# Patient Record
Sex: FEMALE | Race: ASIAN | NOT HISPANIC OR LATINO | Employment: OTHER | URBAN - METROPOLITAN AREA
[De-identification: names, ages, dates, MRNs, and addresses within clinical notes are randomized per-mention and may not be internally consistent; named-entity substitution may affect disease eponyms.]

---

## 2017-01-02 ENCOUNTER — RADIANT APPOINTMENT (OUTPATIENT)
Dept: MAMMOGRAPHY | Facility: CLINIC | Age: 72
End: 2017-01-02
Attending: FAMILY MEDICINE
Payer: COMMERCIAL

## 2017-01-02 DIAGNOSIS — Z12.31 ENCOUNTER FOR SCREENING MAMMOGRAM FOR BREAST CANCER: ICD-10-CM

## 2017-01-02 PROCEDURE — G0202 SCR MAMMO BI INCL CAD: HCPCS

## 2017-01-09 DIAGNOSIS — L30.9 DERMATITIS: Primary | ICD-10-CM

## 2017-01-10 NOTE — TELEPHONE ENCOUNTER
triamcinolone      Last Written Prescription Date: 12/15/16  Last Fill Quantity: 80g,  # refills: 0   Last Office Visit with [facility] or [facility] prescribing provider: 11/11/16 Dr. Peguero

## 2017-01-11 RX ORDER — TRIAMCINOLONE ACETONIDE 1 MG/G
CREAM TOPICAL
Qty: 80 G | Refills: 0 | Status: SHIPPED | OUTPATIENT
Start: 2017-01-11 | End: 2017-04-25

## 2017-01-11 RX ORDER — GLIMEPIRIDE 4 MG/1
TABLET ORAL
Qty: 180 TABLET | Refills: 0 | OUTPATIENT
Start: 2017-01-11

## 2017-01-11 RX ORDER — GABAPENTIN 600 MG/1
TABLET ORAL
Qty: 270 TABLET | Refills: 0 | OUTPATIENT
Start: 2017-01-11

## 2017-01-11 RX ORDER — SIMVASTATIN 20 MG
TABLET ORAL
Qty: 90 TABLET | Refills: 0 | OUTPATIENT
Start: 2017-01-11

## 2017-01-11 NOTE — TELEPHONE ENCOUNTER
Routing refill request to provider for review/approval because:  Per chart review last visit r/t acute dermatitis was more than 1 year ago, please advise.   CHRISTY Carter, Clinical RN Trudy Emery.

## 2017-01-16 ENCOUNTER — TELEPHONE (OUTPATIENT)
Dept: FAMILY MEDICINE | Facility: CLINIC | Age: 72
End: 2017-01-16

## 2017-01-16 DIAGNOSIS — E11.42 DIABETIC POLYNEUROPATHY ASSOCIATED WITH TYPE 2 DIABETES MELLITUS (H): Primary | ICD-10-CM

## 2017-01-16 RX ORDER — HYDROCODONE BITARTRATE AND ACETAMINOPHEN 5; 325 MG/1; MG/1
TABLET ORAL
Qty: 120 TABLET | Refills: 0 | Status: SHIPPED | OUTPATIENT
Start: 2017-01-16 | End: 2017-02-20

## 2017-01-16 NOTE — TELEPHONE ENCOUNTER
Reviewed chart, patient with chronic pain plan, due for refill.   Will fill in pcp absence.     please place rx at front for  and update patient when completed.

## 2017-01-16 NOTE — TELEPHONE ENCOUNTER
Reason for Call:  Medication or medication refill:    Do you use a [facility]?  Name of the pharmacy and phone number for the current request:  Pt will  hard copy of Rx    Name of the medication requested: Hydrocodone-acetaminophen (NORCO) 5-325 MG per tablet    Can we leave a detailed message on this number? YES    Phone number patient can be reached at: Home number on file [phone] (home)    Best Time: Anytime    Call taken on 1/16/2017 at 11:19 AM by Chano Deleon

## 2017-02-12 DIAGNOSIS — L30.9 ACUTE DERMATITIS: ICD-10-CM

## 2017-02-12 DIAGNOSIS — L30.9 DERMATITIS: ICD-10-CM

## 2017-02-12 NOTE — TELEPHONE ENCOUNTER
triamcinolone (KENALOG) 0.1 % cream      Last Written Prescription Date: 1/11/17  Last Fill Quantity: 80,  # refills: 0   Last Office Visit with [facility], [facility] or [facility] prescribing provider: 11/11/16

## 2017-02-13 ENCOUNTER — CARE COORDINATION (OUTPATIENT)
Dept: GERIATRIC MEDICINE | Facility: CLINIC | Age: 72
End: 2017-02-13

## 2017-02-13 NOTE — PROGRESS NOTES
CM attempted to contact member's daughter, Shanika, for her 6 month telephone assessment. No answer, CM left  requesting a return call.    Crystal Parson RN  [facility]  [phone]

## 2017-02-14 ENCOUNTER — CARE COORDINATION (OUTPATIENT)
Dept: GERIATRIC MEDICINE | Facility: CLINIC | Age: 72
End: 2017-02-14

## 2017-02-14 RX ORDER — TRIAMCINOLONE ACETONIDE 1 MG/G
CREAM TOPICAL
Qty: 80 G | Refills: 0 | Status: SHIPPED | OUTPATIENT
Start: 2017-02-14 | End: 2017-04-25

## 2017-02-14 NOTE — PROGRESS NOTES
"Per CM, mailed client an \"Unable to Contact\" letter.    Lou Villegas  Case Management Specialist   [facility]   [phone]    "

## 2017-02-14 NOTE — TELEPHONE ENCOUNTER
Prescription approved per [facility] Refill Protocol.  Selena Loredo RN

## 2017-02-14 NOTE — PROGRESS NOTES
CM attempted to contact member's daughter again, no answer. CMS instructed to mail [facility] letter.    Crystal Parson RN  [facility]  [phone]

## 2017-02-20 DIAGNOSIS — E11.42 DIABETIC POLYNEUROPATHY ASSOCIATED WITH TYPE 2 DIABETES MELLITUS (H): ICD-10-CM

## 2017-02-20 NOTE — TELEPHONE ENCOUNTER
Norco      Last Written Prescription Date: 06/16/17  Last Fill Quantity: 120,  # refills: 0   Last Office Visit with [facility] or [facility] prescribing provider: 08/01/16                                             Routing refill request to provider for review/approval because:  Drug not on the [facility] refill protocol   Zulema Castillo RN

## 2017-02-20 NOTE — TELEPHONE ENCOUNTER
Reason for Call:  Medication or medication refill:    Do you use a [facility]?  Name of the pharmacy and phone number for the current request: written rx    Name of the medication requested: Norco 5-325 mg    Other request: son in law will  sang chammavongsa    Can we leave a detailed message on this number? yes    Phone number patient can be reached at: [phone] ( sang)    Best Time: any    Call taken on 2/20/2017 at 10:01 AM by Debi Nava

## 2017-02-21 RX ORDER — HYDROCODONE BITARTRATE AND ACETAMINOPHEN 5; 325 MG/1; MG/1
TABLET ORAL
Qty: 120 TABLET | Refills: 0 | Status: SHIPPED | OUTPATIENT
Start: 2017-02-21 | End: 2017-03-16

## 2017-03-16 DIAGNOSIS — E11.42 DIABETIC POLYNEUROPATHY ASSOCIATED WITH TYPE 2 DIABETES MELLITUS (H): ICD-10-CM

## 2017-03-16 DIAGNOSIS — L30.9 ACUTE DERMATITIS: ICD-10-CM

## 2017-03-16 DIAGNOSIS — L30.9 DERMATITIS: ICD-10-CM

## 2017-03-16 RX ORDER — HYDROCODONE BITARTRATE AND ACETAMINOPHEN 5; 325 MG/1; MG/1
TABLET ORAL
Qty: 120 TABLET | Refills: 0 | Status: SHIPPED | OUTPATIENT
Start: 2017-03-16 | End: 2017-04-18

## 2017-03-16 RX ORDER — TRIAMCINOLONE ACETONIDE 1 MG/G
CREAM TOPICAL
Qty: 80 G | Refills: 0 | Status: CANCELLED | OUTPATIENT
Start: 2017-03-16

## 2017-03-16 NOTE — TELEPHONE ENCOUNTER
HYDROcodone-acetaminophen (NORCO) 5-325 MG per tablet      Last Written Prescription Date:  2/21/16  Last Fill Quantity: 120,   # refills: 0  Last Office Visit with [facility] or [facility] prescribing provider: 11/11/16  Future Office visit:       Routing refill request to provider for review/approval because:  Drug not on the [facility], [facility] or [facility] refill protocol or controlled substance

## 2017-03-16 NOTE — TELEPHONE ENCOUNTER
triamcinolone (KENALOG) 0.1 % cream      Last Written Prescription Date: 2/14/16  Last Fill Quantity: 80g,  # refills: 0   Last Office Visit with [facility], [facility] or [facility] prescribing provider: 11/11/16

## 2017-03-21 RX ORDER — TRIAMCINOLONE ACETONIDE 1 MG/G
CREAM TOPICAL
Qty: 80 G | Refills: 0 | Status: SHIPPED | OUTPATIENT
Start: 2017-03-21 | End: 2017-12-19

## 2017-03-21 NOTE — TELEPHONE ENCOUNTER
Routing refill request to provider for review/approval because:  Review and verify that the amount being used is appropriate.    Mireille Cuba RN, [facility]

## 2017-04-18 DIAGNOSIS — E11.42 DIABETIC POLYNEUROPATHY ASSOCIATED WITH TYPE 2 DIABETES MELLITUS (H): ICD-10-CM

## 2017-04-18 RX ORDER — HYDROCODONE BITARTRATE AND ACETAMINOPHEN 5; 325 MG/1; MG/1
TABLET ORAL
Qty: 120 TABLET | Refills: 0 | Status: SHIPPED | OUTPATIENT
Start: 2017-04-18 | End: 2017-05-03

## 2017-04-18 NOTE — TELEPHONE ENCOUNTER
I will authorize one refill - patient should be seen for further refills.   Due for diabetes follow-up in May

## 2017-04-18 NOTE — TELEPHONE ENCOUNTER
Reason for Call:  Other prescription    Detailed comments: Patient is requesting refill for Hydrocodone. Patient would also like to note that his son in law, Jorgito will be picking up prescription. Please note account. Thanks.    Phone Number Patient can be reached at: Other phone number:[phone]      Best Time: anytime     Can we leave a detailed message on this number? YES    Call taken on 4/18/2017 at 9:49 AM by Luis Alfredo Navarro

## 2017-04-18 NOTE — TELEPHONE ENCOUNTER
Norco      Last Written Prescription Date: 03/16/17  Last Fill Quantity: 120,  # refills: 0   Last Office Visit with [facility] or [facility] prescribing provider: 11/11/16                                             Routing refill request to provider for review/approval because:  Drug not on the [facility] refill protocol   Zulema Castillo RN

## 2017-04-25 ENCOUNTER — RADIANT APPOINTMENT (OUTPATIENT)
Dept: GENERAL RADIOLOGY | Facility: CLINIC | Age: 72
End: 2017-04-25
Attending: FAMILY MEDICINE
Payer: COMMERCIAL

## 2017-04-25 ENCOUNTER — OFFICE VISIT (OUTPATIENT)
Dept: FAMILY MEDICINE | Facility: CLINIC | Age: 72
End: 2017-04-25
Payer: COMMERCIAL

## 2017-04-25 VITALS
SYSTOLIC BLOOD PRESSURE: 98 MMHG | HEART RATE: 108 BPM | TEMPERATURE: 98.1 F | OXYGEN SATURATION: 100 % | RESPIRATION RATE: 16 BRPM | DIASTOLIC BLOOD PRESSURE: 64 MMHG

## 2017-04-25 DIAGNOSIS — S93.402A SPRAIN OF LEFT ANKLE, UNSPECIFIED LIGAMENT, INITIAL ENCOUNTER: Primary | ICD-10-CM

## 2017-04-25 DIAGNOSIS — G89.4 CHRONIC PAIN SYNDROME: ICD-10-CM

## 2017-04-25 DIAGNOSIS — E11.42 TYPE 2 DIABETES MELLITUS WITH DIABETIC POLYNEUROPATHY, WITHOUT LONG-TERM CURRENT USE OF INSULIN (H): ICD-10-CM

## 2017-04-25 DIAGNOSIS — E78.5 HYPERLIPIDEMIA LDL GOAL <100: ICD-10-CM

## 2017-04-25 DIAGNOSIS — S93.402A SPRAIN OF LEFT ANKLE, UNSPECIFIED LIGAMENT, INITIAL ENCOUNTER: ICD-10-CM

## 2017-04-25 DIAGNOSIS — E11.42 DIABETIC POLYNEUROPATHY ASSOCIATED WITH TYPE 2 DIABETES MELLITUS (H): ICD-10-CM

## 2017-04-25 DIAGNOSIS — I10 ESSENTIAL HYPERTENSION WITH GOAL BLOOD PRESSURE LESS THAN 140/90: ICD-10-CM

## 2017-04-25 PROCEDURE — 73610 X-RAY EXAM OF ANKLE: CPT | Mod: LT

## 2017-04-25 PROCEDURE — 99214 OFFICE O/P EST MOD 30 MIN: CPT | Performed by: FAMILY MEDICINE

## 2017-04-25 RX ORDER — AMLODIPINE AND BENAZEPRIL HYDROCHLORIDE 10; 40 MG/1; MG/1
CAPSULE ORAL
Qty: 90 CAPSULE | Refills: 1 | Status: SHIPPED | OUTPATIENT
Start: 2017-04-25 | End: 2017-12-19

## 2017-04-25 RX ORDER — PIOGLITAZONEHYDROCHLORIDE 45 MG/1
TABLET ORAL
Qty: 90 TABLET | Refills: 1 | Status: SHIPPED | OUTPATIENT
Start: 2017-04-25 | End: 2017-12-19

## 2017-04-25 RX ORDER — GLIMEPIRIDE 4 MG/1
TABLET ORAL
Qty: 180 TABLET | Refills: 1 | Status: SHIPPED | OUTPATIENT
Start: 2017-04-25 | End: 2017-12-19

## 2017-04-25 RX ORDER — SIMVASTATIN 20 MG
TABLET ORAL
Qty: 90 TABLET | Refills: 1 | Status: SHIPPED | OUTPATIENT
Start: 2017-04-25 | End: 2017-12-19

## 2017-04-25 RX ORDER — GABAPENTIN 600 MG/1
600 TABLET ORAL 3 TIMES DAILY
Qty: 270 TABLET | Refills: 1 | Status: SHIPPED | OUTPATIENT
Start: 2017-04-25 | End: 2017-12-19

## 2017-04-25 ASSESSMENT — ANXIETY QUESTIONNAIRES
GAD7 TOTAL SCORE: 6
7. FEELING AFRAID AS IF SOMETHING AWFUL MIGHT HAPPEN: MORE THAN HALF THE DAYS
1. FEELING NERVOUS, ANXIOUS, OR ON EDGE: SEVERAL DAYS
2. NOT BEING ABLE TO STOP OR CONTROL WORRYING: NOT AT ALL
6. BECOMING EASILY ANNOYED OR IRRITABLE: NOT AT ALL
5. BEING SO RESTLESS THAT IT IS HARD TO SIT STILL: SEVERAL DAYS
3. WORRYING TOO MUCH ABOUT DIFFERENT THINGS: SEVERAL DAYS
IF YOU CHECKED OFF ANY PROBLEMS ON THIS QUESTIONNAIRE, HOW DIFFICULT HAVE THESE PROBLEMS MADE IT FOR YOU TO DO YOUR WORK, TAKE CARE OF THINGS AT HOME, OR GET ALONG WITH OTHER PEOPLE: VERY DIFFICULT

## 2017-04-25 ASSESSMENT — PATIENT HEALTH QUESTIONNAIRE - PHQ9: 5. POOR APPETITE OR OVEREATING: SEVERAL DAYS

## 2017-04-25 ASSESSMENT — PAIN SCALES - GENERAL: PAINLEVEL: WORST PAIN (10)

## 2017-04-25 NOTE — LETTER
[facility]    04/25/17    Patient: Enrique Pruitt  YOB: 1945  Medical Record Number: [id number]                                                                  Controlled Substance Agreement  I understand that my care provider has prescribed controlled substances (narcotics, tranquilizers, and/or stimulants) to help manage my condition(s).  I am taking this medicine to help me function or work.  I know that this is strong medicine.  It could have serious side effects and even cause a dependency on the drug.  If I stop these medicines suddenly, I could have severe withdrawal symptoms.    The risks, benefits, and side effects of these medication(s) were explained to me.  I agree that:  1. I will take part in other treatments as advised by my provider.  This may be psychiatry or counseling, physical therapy, behavioral therapy, group treatment, or a referral to a pain clinic.  I will reduce or stop my medicine when my provider tells me to do so.   2. I will take my medicines as prescribed.  I will not change the dose or schedule unless my provider tells me to.  There will be no refills if I  run out early.   I may be contacted at any time without warning and asked to complete a drug test or pill count.   3. I will keep all my appointments at the clinic.  If I miss appointments or fail to follow instructions, my provider may stop my medicine.  4. I will not ask other providers to prescribe controlled substances. And I will not accept controlled substances from other people. If I need another prescribed controlled substance for a new reason, I will notify my provider within one business day.  5. If I enroll in the [place] Medical Marijuana program, I will tell my provider.  I will also sign an agreement to share my medical records with my provider.  6. I will use one pharmacy to fill all of my controlled substance prescriptions.  If my prescription is mailed to my pharmacy, it may  take 5 to 7 days for my medicine to be ready.  7. I understand that my provider, clinic care team, and pharmacy can track controlled substance prescriptions from other providers through a central database (prescription monitoring program).  8. I will bring in my list of medications (or my medicine bottles) each time I come to the clinic.  REV- 04/2016                                                                                                                                            Page 1 of 2      [facility]    04/25/17    Patient: Enrique Pruitt  YOB: 1945  Medical Record Number: [id number]    9. Refills of controlled substances will be made only during office hours.  It is up to me to make sure that I do not run out of my medicines on weekends or holidays.    10. I am responsible for my prescriptions.  If the medicine is lost or stolen, it will not be replaced.   I also agree not to share these medicines with anyone.  11. I agree to not use ANY illegal or recreational drugs.  This includes marijuana, cocaine, bath salts or other drugs.  I agree not to use alcohol unless my provider says I may.  I agree to give urine samples whenever asked.  If I fail to give a urine sample, the provider may stop my medicine.     12. I will tell my nurse or provider right away if I become pregnant or have a new medical problem treated outside of [facility].  13. I understand that this medicine can affect my thinking and judgment.  It may be unsafe for me to drive, use machinery and do dangerous tasks.  I will not do any of these things until I know how the medicine affects me.  If my dose changes, I will wait to see how it affects me.  I will contact my provider if I have concerns about medicine side effects.  I understand that if I do not follow any of the conditions above, my prescriptions or treatment may be stopped.    I agree that my provider, clinic care team, and pharmacy may  work with any city, state or federal law enforcement agency that investigates the misuse, sale, or other diversion of my controlled medicine. I will allow my provider to discuss my care with or share a copy of this agreement with any other treating provider, pharmacy or emergency room where I receive care.  I agree to give up (waive) any right of privacy or confidentiality with respect to these authorizations.   I have read this agreement and have asked questions about anything I did not understand.   ___________________________________    ___________________________  Patient Signature                                                           Date and Time  ___________________________________     ____________________________  Witness                                                                            Date and Time  ___________________________________  Lana Peguero MD  REV-  04/2016                                                                                                                                                                 Page 2 of 2  Opioid Pain Medicines (also known as Narcotics)  What You Need to Know      What are opioids?   Opioids are pain medicines that must be prescribed by a doctor. Examples are:     morphine (MS Contin, Glenna)    oxycodone (Oxycontin)    oxycodone and acetaminophen (Percocet)    hydrocodone and acetaminophen (Vicodin, Norco)     fentanyl patch (Duragesic)     hydromorphone (Dilaudid)     methadone     What do opioids do well?   Opioids are best for short-term pain after a surgery or injury. They also work well for cancer pain. Unlike other pain medicines, they do not cause liver or kidney failure or ulcers. They may help some people with long-lasting (chronic) pain.     What do opioids NOT do well?   Opioids never get rid of pain entirely, and they do not work well for most patients with chronic pain. Opioids do not reduce swelling, one of the causes of  pain. They also don t work well for nerve pain.     Side effects  Talk to your doctor before you start or decide to keep taking one of these medicines. Side effects include:    Lowers your breathing rate enough that it could cause death    Death due to taking more than the prescribed dose    Serious lifelong opioid use      Dependence is not the same as addiction. Addiction is when people keep using a substance that harms their body, their mind or their relations with others. If you have a history of drug or alcohol abuse, taking opioids can cause a relapse.  Over time, opioids don t work as well. Most people will need higher and higher doses. The higher the dose, the more serious the side effects. We don t know the long-term effects of opioids.   People who have used opioids for a long time have a lower quality of life, worse depression, higher levels of pain and more visits to doctors.  Overdose from prescription drugs is the second leading cause of death in the [place] The risk of overdose rises when opioids are taken with other drugs such as:    Medicines used for anxiety and panic attacks (such as lorazepam, alprazolam, clonazepam    Other sedatives    Alcohol    Illegal drugs such as heroin  Never share your opioids with others. Be sure to store opioids in a secure place, locked if possible.Young children can easily swallow them and overdose.     Are there other ways to manage pain?  Ways to help reduce pain:    Exercise every day.    Treat health problems that may be causing pain.    Treat mental health problems like depression and anxiety.     Worse depression symptoms; Less pleasure in things you usually enjoy    Feeling tired or sluggish    Slower thoughts or cloudy thinking    Being more sensitive to pain over time; Pain is harder to control.    Trouble sleeping or restless sleep    Changes in hormone levels (for example, less testosterone).     Changes in sex drive or ability to have sex    Long lasting  nausea and constipation    Trouble breathing while asleep; This is worse with lung problems like COPD or sleep apnea.    Unsafe driving    Getting sick more often    Itching    Feeling dizzy    Dry mouth    Sweating    Trouble emptying the bladder (peeing). This is worse if you have an enlarged prostate or get urinary tract infections (UTIs).    What else should I know about opioids?  When someone takes opioids for too long or too often, they become dependent. This means that if you stop or reduce the medicine too quickly, you will have withdrawal symptoms.          Practice good sleep habits.  Try to go to bed and get up at the same time every day.    Stop smoking.  Tobacco use can make pain worse.    Do things that you enjoy.    Find a way to work through pain without drugs.  Try deep breathing, meditation, visual imagery and aromatherapy.    Ask your doctor to help you create a plan to manage your pain.

## 2017-04-25 NOTE — PROGRESS NOTES
SUBJECTIVE:                                                    Enrique Pruitt is a 72 year old female who presents to clinic today for the following health issues:      Concern - fall     Onset: 1 week    Description:   Pt fell, foot has worsened    Intensity: 10/10    Progression of Symptoms:  worsening    Accompanying Signs & Symptoms:  swelling       Previous history of similar problem:   Hx of numbness    Precipitating factors:   Worsened by: putting pressure    Alleviating factors:  Improved by: NA       Therapies Tried and outcome: wrapped foot - didn't help    SUBJECTIVE:  Here with daughter to asses left ankle / foot injury as above.  Tripped and thinks she inverted left ankle last week.  Very painful, dony posterior.  Walks, but gingerly.  Also in need of follow up diabetes and pain.    Review of systems otherwise negative.  Past medical, family, and social history reviewed and updated in chart.    OBJECTIVE:  BP 98/64 (BP Location: Right arm, Patient Position: Right side, Cuff Size: Adult Regular)  Pulse 108  Temp 98.1  F (36.7  C) (Oral)  Resp 16  SpO2 100%  Alert, pleasant, upbeat, and in no apparent discomfort.  S1 and S2 normal, no murmurs, clicks, gallops or rubs. Regular rate and rhythm. Chest is clear; no wheezes or rales. No edema or JVD.   LLE - post aspect of left ankle swollen, not red.  + tenderness to palpation over distal malleolus.    Past labs reviewed with the patient.     XRAY - my independent reading of the films showed possible avulsion fracture off left distal malleolus     ASSESSMENT / PLAN:  (S92.848A) Sprain of left ankle, unspecified ligament, initial encounter  (primary encounter diagnosis)  Comment: possible fracture - will treat as such - walking boot, limited weight bearing - follow up next week for recheck and another xray as needed   Plan: XR Ankle Left G/E 3 Views            (E11.42) Type 2 diabetes mellitus with diabetic polyneuropathy, without long-term current  use of insulin (H)  Comment: meds refilled - future labs set up   Plan: metFORMIN (GLUCOPHAGE) 500 MG tablet,         glimepiride (AMARYL) 4 MG tablet, sitagliptin         (JANUVIA) 50 MG tablet, pioglitazone (ACTOS) 45        MG tablet, **A1C FUTURE anytime,         **Comprehensive metabolic panel FUTURE anytime,        **Albumin Random Urine Quant FUTURE anytime,         **Lipid panel reflex to direct LDL FUTURE         anytime, **TSH with free T4 reflex FUTURE         anytime            (I10) Essential hypertension with goal blood pressure less than 140/90  Comment: as above   Plan: amLODIPine-benazepril (LOTREL) 10-40 MG per         capsule            (E78.5) Hyperlipidemia LDL goal <100  Comment: as above   Plan: simvastatin (ZOCOR) 20 MG tablet            (E11.42) Diabetic polyneuropathy associated with type 2 diabetes mellitus  Comment: as above   Plan: gabapentin (NEURONTIN) 600 MG tablet            (G89.4) Chronic pain syndrome  Comment: problem list and CSA updated   Plan:     Follow up 1 week   TIMMY Peguero MD    (Chart documentation completed in part with Dragon voice-recognition software.  Even though reviewed some grammatical, spelling, and word errors may remain.)

## 2017-04-25 NOTE — MR AVS SNAPSHOT
After Visit Summary   4/25/2017    Enrique Pruitt    MRN: [id number]           Patient Information     Date Of Birth          1945        Visit Information        Provider Department      4/25/2017 3:20 PM Lana Peguero MD [facility]        Today's Diagnoses     Sprain of left ankle, unspecified ligament, initial encounter    -  1    Type 2 diabetes mellitus with diabetic polyneuropathy, without long-term current use of insulin (H)        Essential hypertension with goal blood pressure less than 140/90        Hyperlipidemia LDL goal <100        Diabetic polyneuropathy associated with type 2 diabetes mellitus        Chronic pain syndrome           Follow-ups after your visit        Follow-up notes from your care team     Return in about 1 week (around 5/2/2017).      Your next 10 appointments already scheduled     May 03, 2017 10:40 AM CDT   Office Visit with Lana Peguero MD   [facility]    [address] [place] [id number] [phone]           Bring a current list of meds and any records pertaining to this visit.  For Physicals, please bring immunization records and any forms needing to be filled out.  Please arrive 10 minutes early to complete paperwork.              Future tests that were ordered for you today     Open Future Orders        Priority Expected Expires Ordered    **A1C FUTURE anytime Routine 4/25/2017 4/25/2018 4/25/2017    **Comprehensive metabolic panel FUTURE anytime Routine 4/25/2017 4/25/2018 4/25/2017    **Albumin Random Urine Quant FUTURE anytime Routine 4/25/2017 4/25/2018 4/25/2017    **Lipid panel reflex to direct LDL FUTURE anytime Routine 4/25/2017 4/25/2018 4/25/2017    **TSH with free T4 reflex FUTURE anytime Routine 4/25/2017 4/25/2018 4/25/2017            Who to contact     If you have questions or need follow up information about today's clinic visit or your  "schedule please contact [facility] directly at [phone].  Normal or non-critical lab and imaging results will be communicated to you by MyChart, letter or phone within 4 business days after the clinic has received the results. If you do not hear from us within 7 days, please contact the clinic through MyChart or phone. If you have a critical or abnormal lab result, we will notify you by phone as soon as possible.  Submit refill requests through MeshApp or call your pharmacy and they will forward the refill request to us. Please allow 3 business days for your refill to be completed.          Additional Information About Your Visit        VoteItharPalkion Information     MeshApp lets you send messages to your doctor, view your test results, renew your prescriptions, schedule appointments and more. To sign up, go to [email or web address] . Click on \"Log in\" on the left side of the screen, which will take you to the Welcome page. Then click on \"Sign up Now\" on the right side of the page.     You will be asked to enter the access code listed below, as well as some personal information. Please follow the directions to create your username and password.     Your access code is: [email or web address]  Expires: 2017 11:49 PM     Your access code will  in 90 days. If you need help or a new code, please call your [facility] clinic or [phone].        Care EveryWhere ID     This is your Care EveryWhere ID. This could be used by other organizations to access your [facility] medical records  [id number]        Your Vitals Were     Pulse Temperature Respirations Pulse Oximetry          108 98.1  F (36.7  C) (Oral) 16 100%         Blood Pressure from Last 3 Encounters:   17 98/64   16 112/66   16 130/68    Weight from Last 3 Encounters:   16 69.8 kg (153 lb 14.4 oz)   16 66.2 kg (145 lb 14.4 oz)   08/12/15 69.7 kg (153 lb 9.6 oz)                 Today's Medication Changes          These " changes are accurate as of: 4/25/17 11:49 PM.  If you have any questions, ask your nurse or doctor.               These medicines have changed or have updated prescriptions.        Dose/Directions    triamcinolone 0.1 % cream   Commonly known as:  KENALOG   This may have changed:  Another medication with the same name was removed. Continue taking this medication, and follow the directions you see here.   Used for:  Dermatitis   Changed by:  Lana Peguero MD        APPLY EXTERNALLY TO THE AFFECTED AREA TWICE DAILY AS NEEDED FOR IRRITATION   Quantity:  80 g   Refills:  0            Where to get your medicines      These medications were sent to [id number] - [place], [place] - [address] [place] [address] RD AT [address], [place] [address]     Phone:  [phone]     amLODIPine-benazepril 10-40 MG per capsule    gabapentin 600 MG tablet    glimepiride 4 MG tablet    metFORMIN 500 MG tablet    pioglitazone 45 MG tablet    simvastatin 20 MG tablet    sitagliptin 50 MG tablet                Primary Care Provider Office Phone # Fax #    Lana Peguero [phone]       [address] [address]        Thank you!     Thank you for choosing [facility]  for your care. Our goal is always to provide you with excellent care. Hearing back from our patients is one way we can continue to improve our services. Please take a few minutes to complete the written survey that you may receive in the mail after your visit with us. Thank you!             Your Updated Medication List - Protect others around you: Learn how to safely use, store and throw away your medicines at www.disposemymeds.org.          This list is accurate as of: 4/25/17 11:49 PM.  Always use your most recent med list.                   Brand Name Dispense Instructions for use    amitriptyline 10 MG tablet    ELAVIL    60 tablet    Take  2 tablets (20 mg) by mouth At Bedtime       amLODIPine-benazepril 10-40 MG per capsule    LOTREL    90 capsule    TAKE 1 CAPSULE BY MOUTH DAILY       blood glucose monitoring test strip    REGINE CONTOUR    300 each    1 strip by In Vitro route 3 times daily Use to test blood sugar three times daily or as directed.       docusate sodium 100 MG capsule    COLACE    30 capsule    Take 1 capsule by mouth 2 times daily as needed for constipation.       Fluocinonide 0.1 % Crea     60 g    Externally apply topically 2 times daily as needed       fluticasone 50 MCG/ACT spray    FLONASE    16 g    SPRAY 1-2 SPRAYS INTO BOTH NOSTRILS DAILY       gabapentin 600 MG tablet    NEURONTIN    270 tablet    Take 1 tablet (600 mg) by mouth 3 times daily       glimepiride 4 MG tablet    AMARYL    180 tablet    TAKE 1 TABLET (4 MG) BY MOUTH 2 TIMES DAILY       HYDROcodone-acetaminophen 5-325 MG per tablet    NORCO    120 tablet    TAKE 1 TO 2 TABLETS BY MOUTH 3 TIMES DAILY AS NEEDED FOR MODERATE TO SVERE PAIN       metFORMIN 500 MG tablet    GLUCOPHAGE    360 tablet    Take 2 tablets (1,000 mg) by mouth 2 times daily (with meals)       * order for DME     1 Device    Accu-Check Glucometer or other as covered by insurance.       * order for DME     1 Units    Accu-Check or other as covered by insurance. Test strips and lancets - per device and insurance plan. Tests once daily. Disp: 100 each Refill: 5 each       * order for DME     1 each    Equipment being ordered: Depends undergarments 80 per month x 1 year       * order for DME     1 each    Equipment being ordered: handheld shower head       * order for DME     1 each    Equipment being ordered: lift chair       * order for DME     1 each    Wheelchair.       * order for DME     1 each    Equipment being ordered: automatic BP cuff       * order for DME     1 Units    Equipment being ordered: diabetic shoes       * order for DME     1 Device    Equipment being ordered: automatic BP  cuff       * order for DME     1 Device    Equipment being ordered: glucometer - one touch ultra or per insurance  Tests daily       * order for DME     1 each    Equipment being ordered: Test strips and lancets - per device and insurance plan. Tests daily  Disp: 100 Refill: 5       * order for DME     1 Device    Equipment being ordered: Life Alert       * order for DME     200 Units    Equipment being ordered: Test strips - accu-check smartview Tests twice daily due to uncontrolled sugar       pioglitazone 45 MG tablet    ACTOS    90 tablet    TAKE 1 TABLET (50 MG) BY MOUTH DAILY       ramelteon 8 MG tablet    ROZEREM    30 tablet    Take 1 tablet (8 mg) by mouth At Bedtime       simvastatin 20 MG tablet    ZOCOR    90 tablet    TAKE 1 TABLET (20 MG) BY MOUTH AT BEDTIME       sitagliptin 50 MG tablet    JANUVIA    90 tablet    TAKE 1 TABLET BY MOUTH EVERY DAY.       * STATIN NOT PRESCRIBED (INTENTIONAL)     0 each        triamcinolone 0.1 % cream    KENALOG    80 g    APPLY EXTERNALLY TO THE AFFECTED AREA TWICE DAILY AS NEEDED FOR IRRITATION       * Notice:  This list has 14 medication(s) that are the same as other medications prescribed for you. Read the directions carefully, and ask your doctor or other care provider to review them with you.

## 2017-04-25 NOTE — NURSING NOTE
"Chief Complaint   Patient presents with     Fall       Initial BP 98/64 (BP Location: Right arm, Patient Position: Right side, Cuff Size: Adult Regular)  Pulse 108  Temp 98.1  F (36.7  C) (Oral)  Resp 16  SpO2 100% Estimated body mass index is 32.44 kg/(m^2) as calculated from the following:    Height as of 11/11/16: 1.467 m (4' 9.75\").    Weight as of 11/11/16: 69.8 kg (153 lb 14.4 oz).  Medication Reconciliation: complete     Will Chiara AVILA      "

## 2017-04-26 ASSESSMENT — ANXIETY QUESTIONNAIRES: GAD7 TOTAL SCORE: 6

## 2017-04-26 ASSESSMENT — PATIENT HEALTH QUESTIONNAIRE - PHQ9: SUM OF ALL RESPONSES TO PHQ QUESTIONS 1-9: 9

## 2017-05-03 ENCOUNTER — OFFICE VISIT (OUTPATIENT)
Dept: FAMILY MEDICINE | Facility: CLINIC | Age: 72
End: 2017-05-03
Payer: COMMERCIAL

## 2017-05-03 ENCOUNTER — RADIANT APPOINTMENT (OUTPATIENT)
Dept: GENERAL RADIOLOGY | Facility: CLINIC | Age: 72
End: 2017-05-03
Attending: FAMILY MEDICINE
Payer: COMMERCIAL

## 2017-05-03 VITALS
SYSTOLIC BLOOD PRESSURE: 126 MMHG | RESPIRATION RATE: 16 BRPM | HEART RATE: 112 BPM | DIASTOLIC BLOOD PRESSURE: 80 MMHG | TEMPERATURE: 98.2 F | OXYGEN SATURATION: 99 %

## 2017-05-03 DIAGNOSIS — I10 HYPERTENSION GOAL BP (BLOOD PRESSURE) < 140/90: ICD-10-CM

## 2017-05-03 DIAGNOSIS — S82.832A CLOSED FRACTURE OF DISTAL END OF LEFT FIBULA, UNSPECIFIED FRACTURE MORPHOLOGY, INITIAL ENCOUNTER: Primary | ICD-10-CM

## 2017-05-03 DIAGNOSIS — E11.42 DIABETIC POLYNEUROPATHY ASSOCIATED WITH TYPE 2 DIABETES MELLITUS (H): ICD-10-CM

## 2017-05-03 DIAGNOSIS — S93.402D SPRAIN OF LEFT ANKLE, UNSPECIFIED LIGAMENT, SUBSEQUENT ENCOUNTER: ICD-10-CM

## 2017-05-03 DIAGNOSIS — E11.42 TYPE 2 DIABETES MELLITUS WITH DIABETIC POLYNEUROPATHY, WITHOUT LONG-TERM CURRENT USE OF INSULIN (H): ICD-10-CM

## 2017-05-03 DIAGNOSIS — E78.5 HYPERLIPIDEMIA LDL GOAL <100: ICD-10-CM

## 2017-05-03 LAB
ALBUMIN SERPL-MCNC: 3.6 G/DL (ref 3.4–5)
ALP SERPL-CCNC: 169 U/L (ref 40–150)
ALT SERPL W P-5'-P-CCNC: 14 U/L (ref 0–50)
ANION GAP SERPL CALCULATED.3IONS-SCNC: 9 MMOL/L (ref 3–14)
AST SERPL W P-5'-P-CCNC: 10 U/L (ref 0–45)
BILIRUB SERPL-MCNC: 0.3 MG/DL (ref 0.2–1.3)
BUN SERPL-MCNC: 21 MG/DL (ref 7–30)
CALCIUM SERPL-MCNC: 9.4 MG/DL (ref 8.5–10.1)
CHLORIDE SERPL-SCNC: 107 MMOL/L (ref 94–109)
CHOLEST SERPL-MCNC: 193 MG/DL
CO2 SERPL-SCNC: 26 MMOL/L (ref 20–32)
CREAT SERPL-MCNC: 0.95 MG/DL (ref 0.52–1.04)
CREAT UR-MCNC: 75 MG/DL
GFR SERPL CREATININE-BSD FRML MDRD: 57 ML/MIN/1.7M2
GLUCOSE SERPL-MCNC: 148 MG/DL (ref 70–99)
HBA1C MFR BLD: 6.9 % (ref 4.3–6)
HDLC SERPL-MCNC: 55 MG/DL
LDLC SERPL CALC-MCNC: 115 MG/DL
MICROALBUMIN UR-MCNC: 21 MG/L
MICROALBUMIN/CREAT UR: 28.65 MG/G CR (ref 0–25)
NONHDLC SERPL-MCNC: 138 MG/DL
POTASSIUM SERPL-SCNC: 4.8 MMOL/L (ref 3.4–5.3)
PROT SERPL-MCNC: 7.9 G/DL (ref 6.8–8.8)
SODIUM SERPL-SCNC: 142 MMOL/L (ref 133–144)
TRIGL SERPL-MCNC: 117 MG/DL
TSH SERPL DL<=0.005 MIU/L-ACNC: 1.12 MU/L (ref 0.4–4)

## 2017-05-03 PROCEDURE — 36415 COLL VENOUS BLD VENIPUNCTURE: CPT | Performed by: FAMILY MEDICINE

## 2017-05-03 PROCEDURE — 84443 ASSAY THYROID STIM HORMONE: CPT | Performed by: FAMILY MEDICINE

## 2017-05-03 PROCEDURE — 27786 TREATMENT OF ANKLE FRACTURE: CPT | Performed by: FAMILY MEDICINE

## 2017-05-03 PROCEDURE — 73610 X-RAY EXAM OF ANKLE: CPT | Mod: LT

## 2017-05-03 PROCEDURE — 83036 HEMOGLOBIN GLYCOSYLATED A1C: CPT | Performed by: FAMILY MEDICINE

## 2017-05-03 PROCEDURE — 82043 UR ALBUMIN QUANTITATIVE: CPT | Performed by: FAMILY MEDICINE

## 2017-05-03 PROCEDURE — 99213 OFFICE O/P EST LOW 20 MIN: CPT | Mod: 25 | Performed by: FAMILY MEDICINE

## 2017-05-03 PROCEDURE — 80061 LIPID PANEL: CPT | Performed by: FAMILY MEDICINE

## 2017-05-03 PROCEDURE — 80053 COMPREHEN METABOLIC PANEL: CPT | Performed by: FAMILY MEDICINE

## 2017-05-03 RX ORDER — HYDROCODONE BITARTRATE AND ACETAMINOPHEN 5; 325 MG/1; MG/1
TABLET ORAL
Qty: 120 TABLET | Refills: 0 | Status: SHIPPED | OUTPATIENT
Start: 2017-05-03 | End: 2017-05-17

## 2017-05-03 ASSESSMENT — PAIN SCALES - GENERAL: PAINLEVEL: WORST PAIN (10)

## 2017-05-03 NOTE — PROGRESS NOTES
SUBJECTIVE:                                                    Enrique Pruitt is a 72 year old female who presents to clinic today for the following health issues:      1. Follow up OV 4/25/17 - sprain, L ankle   - pt states can't put pressure on ankle yet, still having pain -- 10/10 currently    SUBJECTIVE:  Here today in follow-up of left ankle injury and a follow-up of diabetes, hypertension, lipids. I saw her last week and about a week prior to that she had twisted her left ankle and was having pain with ambulation. The initial x-ray was unclear about the possibility of fracture so I placed her in a walking boot with relative nonweightbearing status with plans to return this week when she was coming in for diabetes. She is fine with the boot on it feels pretty comfortable but when she tries to walk without it it is still fairly painful. Still a bit swollen.  As far as diabetes, etc. she is generally doing well. Taking medications as prescribed without side effects. No cardiac symptoms. Still having trouble with peripheral neuropathy but it isn't getting any worse.    Review of systems otherwise negative.  Past medical, family, and social history reviewed and updated in chart.    OBJECTIVE:  /80 (BP Location: Right arm, Patient Position: Right side, Cuff Size: Adult Regular)  Pulse 112  Temp 98.2  F (36.8  C) (Oral)  Resp 16  SpO2 99%  Alert, pleasant, upbeat, and in no apparent discomfort.  S1 and S2 normal, no murmurs, clicks, gallops or rubs. Regular rate and rhythm. Chest is clear; no wheezes or rales. No edema or JVD.   Left ankle - still swollen and slightly bruised over the lateral aspect in moving onto her anterior lateral ankle joint. Mild tenderness to palpation directly over the distal fibula but less than last week  Past labs reviewed with the patient.     XRAY - my independent reading of the films showed a more clearly define nondisplaced fracture of her left distal fibula. I don't  see a lot of callus formation at this point     ASSESSMENT / PLAN:  (O85.734Y) Closed fracture of distal end of left fibula, unspecified fracture morphology, initial encounter  (primary encounter diagnosis)  Comment: I think it is clear that she has a nondisplaced fracture of her distal fibula at this point and we will treated thusly. She is 2 weeks out from the initial injury and I discussed with the patient and her daughter that this is 4-6 weeks of expected healing. The good news is because of its location I think we can get away with a walking boot as long as she limits her ambulation to basic self-care. For anything longer such as a trip to the store, etc. I would want her in a wheelchair for scooter.  Okay to take off boot to change close, bathe, etc. as long as she is nonweightbearing, but keep it on at all other times. I will see her back in 2 weeks with plans to re-x-ray and decide upon length of treatment  Plan: XR Ankle Left G/E 3 Views, CLOSED TX DISTAL         FIBULA FX W/O MANIPULATION            (E11.42) Type 2 diabetes mellitus with diabetic polyneuropathy, without long-term current use of insulin (H)  Comment: Recheck lab work and adjust as needed  Plan: Lipid panel reflex to direct LDL            (E78.5) Hyperlipidemia LDL goal <100  Comment: Recheck LDL  Plan:     (I10) Hypertension goal BP (blood pressure) < 140/90  Comment: At goal. Continue same treatment  Plan:     (E11.42) Diabetic polyneuropathy associated with type 2 diabetes mellitus  Comment: refilled   Plan: HYDROcodone-acetaminophen (NORCO) 5-325 MG per         tablet            Follow up 2 weeks  TIMMY Peguero MD    (Chart documentation completed in part with Dragon voice-recognition software.  Even though reviewed some grammatical, spelling, and word errors may remain.)

## 2017-05-03 NOTE — MR AVS SNAPSHOT
After Visit Summary   5/3/2017    Enrique Pruitt    MRN: [id number]           Patient Information     Date Of Birth          1945        Visit Information        Provider Department      5/3/2017 10:40 AM Lana Peguero MD [facility]        Today's Diagnoses     Closed fracture of distal end of left fibula, unspecified fracture morphology, initial encounter    -  1    Type 2 diabetes mellitus with diabetic polyneuropathy, without long-term current use of insulin (H)        Hyperlipidemia LDL goal <100        Hypertension goal BP (blood pressure) < 140/90        Diabetic polyneuropathy associated with type 2 diabetes mellitus           Follow-ups after your visit        Follow-up notes from your care team     Return in about 2 weeks (around 5/17/2017).      Your next 10 appointments already scheduled     May 17, 2017  1:00 PM CDT   Office Visit with Lana Peguero MD   [facility] ([facility])    [address] [place] [id number] [phone]           Bring a current list of meds and any records pertaining to this visit.  For Physicals, please bring immunization records and any forms needing to be filled out.  Please arrive 10 minutes early to complete paperwork.              Who to contact     If you have questions or need follow up information about today's clinic visit or your schedule please contact [facility] directly at [phone].  Normal or non-critical lab and imaging results will be communicated to you by MyChart, letter or phone within 4 business days after the clinic has received the results. If you do not hear from us within 7 days, please contact the clinic through MyChart or phone. If you have a critical or abnormal lab result, we will notify you by phone as soon as possible.  Submit refill requests through TheLadders or call your pharmacy and they will forward the refill  "request to us. Please allow 3 business days for your refill to be completed.          Additional Information About Your Visit        VocalyticsharJobOn Information     Bizdom lets you send messages to your doctor, view your test results, renew your prescriptions, schedule appointments and more. To sign up, go to [email or web address] . Click on \"Log in\" on the left side of the screen, which will take you to the Welcome page. Then click on \"Sign up Now\" on the right side of the page.     You will be asked to enter the access code listed below, as well as some personal information. Please follow the directions to create your username and password.     Your access code is: [email or web address]  Expires: 2017 11:49 PM     Your access code will  in 90 days. If you need help or a new code, please call your [facility] clinic or [phone].        Care EveryWhere ID     This is your Care EveryWhere ID. This could be used by other organizations to access your [facility] medical records  [id number]        Your Vitals Were     Pulse Temperature Respirations Pulse Oximetry          112 98.2  F (36.8  C) (Oral) 16 99%         Blood Pressure from Last 3 Encounters:   17 126/80   17 98/64   16 112/66    Weight from Last 3 Encounters:   16 69.8 kg (153 lb 14.4 oz)   16 66.2 kg (145 lb 14.4 oz)   08/12/15 69.7 kg (153 lb 9.6 oz)              We Performed the Following     **A1C FUTURE anytime     **Albumin Random Urine Quant FUTURE anytime     **Comprehensive metabolic panel FUTURE anytime     **TSH with free T4 reflex FUTURE anytime     CLOSED TX DISTAL FIBULA FX W/O MANIPULATION     Lipid panel reflex to direct LDL          Where to get your medicines      Some of these will need a paper prescription and others can be bought over the counter.  Ask your nurse if you have questions.     Bring a paper prescription for each of these medications     HYDROcodone-acetaminophen 5-325 MG per tablet          Primary " Care Provider Office Phone # Fax #    Lana Titi Peguero [phone]       [facility] [address] [place] [address]        Thank you!     Thank you for choosing [facility]  for your care. Our goal is always to provide you with excellent care. Hearing back from our patients is one way we can continue to improve our services. Please take a few minutes to complete the written survey that you may receive in the mail after your visit with us. Thank you!             Your Updated Medication List - Protect others around you: Learn how to safely use, store and throw away your medicines at www.disposemymeds.org.          This list is accurate as of: 5/3/17 11:42 AM.  Always use your most recent med list.                   Brand Name Dispense Instructions for use    amitriptyline 10 MG tablet    ELAVIL    60 tablet    Take 2 tablets (20 mg) by mouth At Bedtime       amLODIPine-benazepril 10-40 MG per capsule    LOTREL    90 capsule    TAKE 1 CAPSULE BY MOUTH DAILY       blood glucose monitoring test strip    REGINE CONTOUR    300 each    1 strip by In Vitro route 3 times daily Use to test blood sugar three times daily or as directed.       docusate sodium 100 MG capsule    COLACE    30 capsule    Take 1 capsule by mouth 2 times daily as needed for constipation.       Fluocinonide 0.1 % Crea     60 g    Externally apply topically 2 times daily as needed       fluticasone 50 MCG/ACT spray    FLONASE    16 g    SPRAY 1-2 SPRAYS INTO BOTH NOSTRILS DAILY       gabapentin 600 MG tablet    NEURONTIN    270 tablet    Take 1 tablet (600 mg) by mouth 3 times daily       glimepiride 4 MG tablet    AMARYL    180 tablet    TAKE 1 TABLET (4 MG) BY MOUTH 2 TIMES DAILY       HYDROcodone-acetaminophen 5-325 MG per tablet    NORCO    120 tablet    TAKE 1 TO 2 TABLETS BY MOUTH 3 TIMES DAILY AS NEEDED FOR MODERATE TO SVERE PAIN       metFORMIN 500 MG tablet    GLUCOPHAGE    360  tablet    Take 2 tablets (1,000 mg) by mouth 2 times daily (with meals)       * order for DME     1 Device    Accu-Check Glucometer or other as covered by insurance.       * order for DME     1 Units    Accu-Check or other as covered by insurance. Test strips and lancets - per device and insurance plan. Tests once daily. Disp: 100 each Refill: 5 each       * order for DME     1 each    Equipment being ordered: Depends undergarments 80 per month x 1 year       * order for DME     1 each    Equipment being ordered: handheld shower head       * order for DME     1 each    Equipment being ordered: lift chair       * order for DME     1 each    Wheelchair.       * order for DME     1 each    Equipment being ordered: automatic BP cuff       * order for DME     1 Units    Equipment being ordered: diabetic shoes       * order for DME     1 Device    Equipment being ordered: automatic BP cuff       * order for DME     1 Device    Equipment being ordered: glucometer - one touch ultra or per insurance  Tests daily       * order for DME     1 each    Equipment being ordered: Test strips and lancets - per device and insurance plan. Tests daily  Disp: 100 Refill: 5       * order for DME     1 Device    Equipment being ordered: Life Alert       * order for DME     200 Units    Equipment being ordered: Test strips - accu-check smartview Tests twice daily due to uncontrolled sugar       pioglitazone 45 MG tablet    ACTOS    90 tablet    TAKE 1 TABLET (50 MG) BY MOUTH DAILY       ramelteon 8 MG tablet    ROZEREM    30 tablet    Take 1 tablet (8 mg) by mouth At Bedtime       simvastatin 20 MG tablet    ZOCOR    90 tablet    TAKE 1 TABLET (20 MG) BY MOUTH AT BEDTIME       sitagliptin 50 MG tablet    JANUVIA    90 tablet    TAKE 1 TABLET BY MOUTH EVERY DAY.       * STATIN NOT PRESCRIBED (INTENTIONAL)     0 each        triamcinolone 0.1 % cream    KENALOG    80 g    APPLY EXTERNALLY TO THE AFFECTED AREA TWICE DAILY AS NEEDED FOR  IRRITATION       * Notice:  This list has 14 medication(s) that are the same as other medications prescribed for you. Read the directions carefully, and ask your doctor or other care provider to review them with you.

## 2017-05-03 NOTE — LETTER
[address]  [address]  [phone]    May 9, 2017      Enrique Pruitt  [address] [place] [id number]              Dear Enrique,    Your lab work looks just fine.  Keep up the good work.       Sincerely,    Lana Peguero M.D.

## 2017-05-03 NOTE — NURSING NOTE
"Chief Complaint   Patient presents with     Musculoskeletal Problem     L foot       Initial /80 (BP Location: Right arm, Patient Position: Right side, Cuff Size: Adult Regular)  Pulse 112  Temp 98.2  F (36.8  C) (Oral)  Resp 16  SpO2 99% Estimated body mass index is 32.44 kg/(m^2) as calculated from the following:    Height as of 11/11/16: 1.467 m (4' 9.75\").    Weight as of 11/11/16: 69.8 kg (153 lb 14.4 oz).  Medication Reconciliation: complete     Will Chiara AVILA      "

## 2017-05-04 NOTE — TELEPHONE ENCOUNTER
Radiation Oncology Follow-Up


Date of Visit


May 4, 2017.


 (Rafaela Holguin PA-C)





Reason For Visit


One-month follow-up cancer survivorship care plan.


 (Rafaela Holguin PA-C)





Radiation Completion Date


4/5/17


 (Rafaela Holguin PA-C)





Diagnosis





(1) Intraductal carcinoma of left breast


Status:  Acute        Onset Date:  11/18/2016


Stage:  0


Permanent Comment:  STAGING: Left breast, DCIS, grade 3, comedonecrosis, ER/PA 

positive





Abnormal left breast mammogram followed with serial mammography


Status post stereotactic biopsy 11/18/2016 revealing DCIS


Estrogen receptor positive and progesterone receptor positive


Status post MRI then MRI guided stereotactic biopsy 12/16/2016 benign


Status post seed localization segmental mastectomy 01/09/2017


Stage pTis NX MX


Status post completion of radiation therapy 04/05/2017.  She received 6640 cGy  

Last Edited By: Rafaela Holguin on Apr 17, 2017 10:23 (Rafaela Holguin PA-C)





History of Present Illness


Ms. Sommer is a 43-year-old female who initially presented with a abnormal 

mammogram on 05/05/2016 which revealed a small 3 mm cluster in the left upper 

outer quadrant that were benign-appearing; the recommendation was for a repeat 

mammogram in 6 months.  The patient did have a repeat unilateral left 

diagnostic mammogram on 11/14/2016 which revealed 2 small faint clusters of 

microcalcifications in the upper outer quadrant of the left breast.  The 

patient underwent a stereotactic biopsy on 11/18/2016 the left breast which 

revealed ductal carcinoma in situ that was grade 3 with focal comedonecrosis.  

The tumor was identified is estrogen receptor positive and progesterone 

receptor positive.  The patient subsequently had a bilateral MRI of the breasts 

on 12/14/2016 which revealed biopsy site changes in the left breast 1 o'clock 

position as expected and no other evidence of disease.  The report did mention 

a second cluster of faint amorphous microcalcifications and recommended a 

second biopsy which was completed on 12/16/2016 and only showed fibrocystic 

changes with calcifications and no evidence of cancer.  The patient 

subsequently was seen at [facility] and underwent a lumpectomy on 01/09/ 2017.  Pathology revealed ductal carcinoma in situ, micropapillary type, 

nuclear grade 3 with comedonecrosis and associated calcification.  The DCIS 

measured up to 5 mm.  The margins were free of resection however the closest 

margin was 1.5 mm and was the anterior margin.  The patient was seen by medical 

oncology who did recommend anti-hormonal therapy due to the positive estrogen 

receptor status.  We are now seeing the patient in consultation discuss the 

role of adjuvant radiation therapy.





She underwent conventional radiation therapy.  Radiation was completed 04/05/ 2017.  She received received 6640 cGy.


 (Rafaela Holguin PA-C)





Interim History


She's been doing well over the past month.  The areas of skin irritation healed 

without difficulty.  At the end of treatment she had radiation dermatitis.  It 

was treated with Silvadene cream.  She also had itching that was treated with 

Lidex cream.  She has noticed no changes to her breast.  She is noted no masses 

or tenderness no change of the axilla.  She is now on tamoxifen.  She denies 

side effects.  She did have a small area on the outer aspect of her incision 

that became red and then had a small amount of drainage 2 weeks ago.  She used 

Neosporin and this area healed without difficulty.


 (Rafaela Holguin PA-C)





Allergies


Coded Allergies:  


     No Known Allergies (Unverified , 2/7/17)





Home Medications


Scheduled


Levothyroxine Sodium (Synthroid), 1 TAB PO DAILY


Levothyroxine Sodium (Levothyroxine Sodium), 1.5 TAB PO AS DIRECTED


Tamoxifen (Nolvadex), 20 MG PO DAILY





Review of Systems


Gastrointestinal:  


   Symptoms:  WNL


Oral:  


   Symptoms:  No Problems


Respiratory:  


   Symptoms:  WNL


Urinary:  


   Symptoms:  WNL


Skin:  


   Symptoms:  No Problems


   Other Skin Symptoms:  2 weeks ago incision in axilla area drained - used 

neosporin - ok now


Breast:  


   Right Upper Arm Measurement:  28.3


   Right Mid Arm Measurement:  22.5


   Right Wrist Measurement:  15.2


   Left Upper Arm Measurement:  29.2


   Left Mid Arm Measurement:  24.0


   Left Wrist Measurement:  15.0


   Arm Dominence:  Right


 (Rafaela Holguin PA-C)





Physical Exam





Vital Signs








  Date Time  Temp Pulse Resp B/P Pulse Ox O2 Delivery O2 Flow Rate FiO2


 


5/4/17 14:01 36.8 62 16 131/83 98   








General Appearance:  no apparent distress


Eyes:  normal inspection, EOMI


ENT:  normal ENT inspection, hearing grossly normal


Neck:  no adenopathy, thyroid normal


Respiratory/Chest:  lungs clear, no respiratory distress, no accessory muscle 

use


Breast:


Breast examination reveals well-healed incision of the left breast.  There is 

resolving hyperpigmentation.  There is a small area of the lateral aspect of 

the incision line that has resolving erythema.  There is no drainage or sign of 

infection.  There is minimal edema of the breast.  There is no axillary 

adenopathy.  She has no skin retractions or nipple changes.  Using the [place] 

score cosmesis she has a good outcome.  The right breast showed no masses or 

tenderness no axillary adenopathy.


Cardiovascular:  regular rate, rhythm, no gallop, no murmur


Extremities:  no pedal edema


Neurologic/Psychiatric:  no motor/sensory deficits, alert, normal mood/affect


Skin:  warm/dry


 (Rafaela Holguin PA-C)





Assessment & Plan


Plan: The patient is also seen and examined by Dr. Su today.  She'll 

continue regular follow-up with Dr. Heredia, Dr. Kennedy, and Dr. Levin.  She 

hasn't appointment to see Dr. Abraham in August.  Today we completed a cancer 

survivorship care plan.  Copy of the document was given to the patient.  We 

discussed follow-up mammography.  The patient did wish to continue mammography 

at the breast Center.  She was therefore scheduled for a digital diagnostic 

mammogram of both breasts.  She is due for mammography of the right breast 

also.  She'll continue to do breast self-examinations.  We asked her to return 

to our office in 6 months.  She may call if she has any questions or concerns 

in the interim.


 (Rafaela Holguin PA-C)


I agree with note created by Rafaela Holguin PA-C. I reviewed the patient's 

chart and information with her.  I have examined and evaluated the patient. I 

reviewed relevant clinical information and answered the patient's and/or family'

s questions. 


 (Sesar Su MD)


Total Time In Follow-Up


I spent 20 minutes speaking to the patient performing examination.  I spent 20 

minutes reviewing information, preparing the survivorship document, and 

completing this note.


 (Rafaela Holguin PA-C)


I spent 15 minutes examining and counseling the patient.  


 (Sesar Su MD)





Copy To


Richie Heredia M.D.; ERASMO KENNEDY M.D.; Kalani Levin M.D. Reason for Call:  Medication or medication refill:    Do you use a [facility]?  Name of the pharmacy and phone number for the current request:  WRITTEN PRESCRIPTION REQUESTED    Name of the medication requested: Norco 5-325 mg    Other request: jeremias Calvert    Can we leave a detailed message on this number? YES    Phone number patient can be reached at: [phone]    Best Time: any    Call taken on 3/16/2017 at 9:04 AM by Debi Nava

## 2017-05-08 NOTE — PROGRESS NOTES
Please mail results and note to patient:    Your lab work looks just fine.  Keep up the good work.  TIMMY Peguero MD

## 2017-05-17 ENCOUNTER — OFFICE VISIT (OUTPATIENT)
Dept: FAMILY MEDICINE | Facility: CLINIC | Age: 72
End: 2017-05-17
Payer: COMMERCIAL

## 2017-05-17 ENCOUNTER — RADIANT APPOINTMENT (OUTPATIENT)
Dept: GENERAL RADIOLOGY | Facility: CLINIC | Age: 72
End: 2017-05-17
Attending: FAMILY MEDICINE
Payer: COMMERCIAL

## 2017-05-17 VITALS
HEIGHT: 58 IN | HEART RATE: 110 BPM | DIASTOLIC BLOOD PRESSURE: 60 MMHG | TEMPERATURE: 99 F | BODY MASS INDEX: 32.51 KG/M2 | WEIGHT: 154.9 LBS | RESPIRATION RATE: 12 BRPM | SYSTOLIC BLOOD PRESSURE: 108 MMHG | OXYGEN SATURATION: 97 %

## 2017-05-17 DIAGNOSIS — E11.42 DIABETIC POLYNEUROPATHY ASSOCIATED WITH TYPE 2 DIABETES MELLITUS (H): ICD-10-CM

## 2017-05-17 DIAGNOSIS — S82.832D CLOSED FRACTURE OF DISTAL END OF LEFT FIBULA WITH ROUTINE HEALING, UNSPECIFIED FRACTURE MORPHOLOGY, SUBSEQUENT ENCOUNTER: ICD-10-CM

## 2017-05-17 DIAGNOSIS — S82.832D CLOSED FRACTURE OF DISTAL END OF LEFT FIBULA WITH ROUTINE HEALING, UNSPECIFIED FRACTURE MORPHOLOGY, SUBSEQUENT ENCOUNTER: Primary | ICD-10-CM

## 2017-05-17 PROCEDURE — 99207 ZZC FRACTURE CARE IN GLOBAL PERIOD: CPT | Performed by: FAMILY MEDICINE

## 2017-05-17 PROCEDURE — 73610 X-RAY EXAM OF ANKLE: CPT | Mod: LT

## 2017-05-17 RX ORDER — HYDROCODONE BITARTRATE AND ACETAMINOPHEN 5; 325 MG/1; MG/1
TABLET ORAL
Qty: 120 TABLET | Refills: 0 | Status: SHIPPED | OUTPATIENT
Start: 2017-06-02 | End: 2017-08-30

## 2017-05-17 ASSESSMENT — PAIN SCALES - GENERAL: PAINLEVEL: MODERATE PAIN (5)

## 2017-05-17 NOTE — PROGRESS NOTES
"  SUBJECTIVE:                                                    Enrique Pruitt is a 72 year old female who presents to clinic today for the following health issues:      1. Recheck L foot -- two week follow up   - some improvement noted   - on feet for 3 weeks, walking    SUBJECTIVE:  Here today follow-up left distal fibular fracture. Last seen 2 weeks ago. Pain has improved significantly. Has maintained her boot on at nearly all times. No new complaints    Review of systems otherwise negative.  Past medical, family, and social history reviewed and updated in chart.    OBJECTIVE:  /60 (BP Location: Right arm, Patient Position: Right side, Cuff Size: Adult Regular)  Pulse 110  Temp 99  F (37.2  C) (Oral)  Resp 12  Ht 1.467 m (4' 9.75\")  Wt 70.3 kg (154 lb 14.4 oz)  SpO2 97%  BMI 32.66 kg/m2  Alert, pleasant, upbeat, and in no apparent discomfort.  S1 and S2 normal, no murmurs, clicks, gallops or rubs. Regular rate and rhythm. Chest is clear; no wheezes or rales. No edema or JVD.  LLE - swelling has decreased around the distal fibula and there is minimal tenderness to palpation. No bruising  Past labs reviewed with the patient.     XRAY - my independent reading of the films showed definite callus formation and smoothing of the fracture     ASSESSMENT / PLAN:  (T75.966I) Closed fracture of distal end of left fibula with routine healing, unspecified fracture morphology, subsequent encounter  (primary encounter diagnosis)  Comment: At least one month out and I think it's okay to start taking her boot off for range of motion and low level walking. Put the boot back on for longer walking during the next 2 weeks but can gradually start weightbearing. Discussed formal physical therapy but they declined. No need for further follow-up at this time unless symptoms don't continue to improve. Billed under global fracture care  Plan: XR Ankle Left G/E 3 Views            (E11.42) Diabetic polyneuropathy " associated with type 2 diabetes mellitus  Comment:   Plan: HYDROcodone-acetaminophen (NORCO) 5-325 MG per         tablet            Follow up as needed   S. Titi Peguero MD    (Chart documentation completed in part with Dragon voice-recognition software.  Even though reviewed some grammatical, spelling, and word errors may remain.)

## 2017-05-17 NOTE — MR AVS SNAPSHOT
"              After Visit Summary   2017    Enrqiue Pruitt    MRN: [id number]           Patient Information     Date Of Birth          1945        Visit Information        Provider Department      2017 1:00 PM Lana Peguero MD [facility]        Today's Diagnoses     Closed fracture of distal end of left fibula with routine healing, unspecified fracture morphology, subsequent encounter    -  1    Diabetic polyneuropathy associated with type 2 diabetes mellitus           Follow-ups after your visit        Who to contact     If you have questions or need follow up information about today's clinic visit or your schedule please contact [facility] directly at [phone].  Normal or non-critical lab and imaging results will be communicated to you by MyChart, letter or phone within 4 business days after the clinic has received the results. If you do not hear from us within 7 days, please contact the clinic through MyChart or phone. If you have a critical or abnormal lab result, we will notify you by phone as soon as possible.  Submit refill requests through [employer] or call your pharmacy and they will forward the refill request to us. Please allow 3 business days for your refill to be completed.          Additional Information About Your Visit        MyChart Information     [employer] lets you send messages to your doctor, view your test results, renew your prescriptions, schedule appointments and more. To sign up, go to [email or web address] . Click on \"Log in\" on the left side of the screen, which will take you to the Welcome page. Then click on \"Sign up Now\" on the right side of the page.     You will be asked to enter the access code listed below, as well as some personal information. Please follow the directions to create your username and password.     Your access code is: [email or web address]  Expires: 2017 11:49 PM     Your access code will  in 90 " "days. If you need help or a new code, please call your [facility] or [phone].        Care EveryWhere ID     This is your Care EveryWhere ID. This could be used by other organizations to access your [place] medical records  [id number]        Your Vitals Were     Pulse Temperature Respirations Height Pulse Oximetry BMI (Body Mass Index)    110 99  F (37.2  C) (Oral) 12 1.467 m (4' 9.75\") 97% 32.66 kg/m2       Blood Pressure from Last 3 Encounters:   05/17/17 108/60   05/03/17 126/80   04/25/17 98/64    Weight from Last 3 Encounters:   05/17/17 70.3 kg (154 lb 14.4 oz)   11/11/16 69.8 kg (153 lb 14.4 oz)   06/29/16 66.2 kg (145 lb 14.4 oz)                 Where to get your medicines      Some of these will need a paper prescription and others can be bought over the counter.  Ask your nurse if you have questions.     Bring a paper prescription for each of these medications     HYDROcodone-acetaminophen 5-325 MG per tablet          Primary Care Provider Office Phone # Fax #    Lana Titi Peguero [phone]       [place] [phone]        Thank you!     Thank you for choosing [facility]  for your care. Our goal is always to provide you with excellent care. Hearing back from our patients is one way we can continue to improve our services. Please take a few minutes to complete the written survey that you may receive in the mail after your visit with us. Thank you!             Your Updated Medication List - Protect others around you: Learn how to safely use, store and throw away your medicines at www.disposemymeds.org.          This list is accurate as of: 5/17/17  1:47 PM.  Always use your most recent med list.                   Brand Name Dispense Instructions for use    amitriptyline 10 MG tablet    ELAVIL    60 tablet    Take 2 tablets (20 mg) by mouth At Bedtime       amLODIPine-benazepril 10-40 MG per capsule    " LOTREL    90 capsule    TAKE 1 CAPSULE BY MOUTH DAILY       blood glucose monitoring test strip    REGINE CONTOUR    300 each    1 strip by In Vitro route 3 times daily Use to test blood sugar three times daily or as directed.       docusate sodium 100 MG capsule    COLACE    30 capsule    Take 1 capsule by mouth 2 times daily as needed for constipation.       Fluocinonide 0.1 % Crea     60 g    Externally apply topically 2 times daily as needed       fluticasone 50 MCG/ACT spray    FLONASE    16 g    SPRAY 1-2 SPRAYS INTO BOTH NOSTRILS DAILY       gabapentin 600 MG tablet    NEURONTIN    270 tablet    Take 1 tablet (600 mg) by mouth 3 times daily       glimepiride 4 MG tablet    AMARYL    180 tablet    TAKE 1 TABLET (4 MG) BY MOUTH 2 TIMES DAILY       HYDROcodone-acetaminophen 5-325 MG per tablet   Start taking on:  6/2/2017    NORCO    120 tablet    TAKE 1 TO 2 TABLETS BY MOUTH 3 TIMES DAILY AS NEEDED FOR MODERATE TO SVERE PAIN       metFORMIN 500 MG tablet    GLUCOPHAGE    360 tablet    Take 2 tablets (1,000 mg) by mouth 2 times daily (with meals)       * order for DME     1 Device    Accu-Check Glucometer or other as covered by insurance.       * order for DME     1 Units    Accu-Check or other as covered by insurance. Test strips and lancets - per device and insurance plan. Tests once daily. Disp: 100 each Refill: 5 each       * order for DME     1 each    Equipment being ordered: Depends undergarments 80 per month x 1 year       * order for DME     1 each    Equipment being ordered: handheld shower head       * order for DME     1 each    Equipment being ordered: lift chair       * order for DME     1 each    Wheelchair.       * order for DME     1 each    Equipment being ordered: automatic BP cuff       * order for DME     1 Units    Equipment being ordered: diabetic shoes       * order for DME     1 Device    Equipment being ordered: automatic BP cuff       * order for DME     1 Device    Equipment being  ordered: glucometer - one touch ultra or per insurance  Tests daily       * order for DME     1 each    Equipment being ordered: Test strips and lancets - per device and insurance plan. Tests daily  Disp: 100 Refill: 5       * order for DME     1 Device    Equipment being ordered: Life Alert       * order for DME     200 Units    Equipment being ordered: Test strips - accu-check smartview Tests twice daily due to uncontrolled sugar       pioglitazone 45 MG tablet    ACTOS    90 tablet    TAKE 1 TABLET (50 MG) BY MOUTH DAILY       ramelteon 8 MG tablet    ROZEREM    30 tablet    Take 1 tablet (8 mg) by mouth At Bedtime       simvastatin 20 MG tablet    ZOCOR    90 tablet    TAKE 1 TABLET (20 MG) BY MOUTH AT BEDTIME       sitagliptin 50 MG tablet    JANUVIA    90 tablet    TAKE 1 TABLET BY MOUTH EVERY DAY.       * STATIN NOT PRESCRIBED (INTENTIONAL)     0 each        triamcinolone 0.1 % cream    KENALOG    80 g    APPLY EXTERNALLY TO THE AFFECTED AREA TWICE DAILY AS NEEDED FOR IRRITATION       * Notice:  This list has 14 medication(s) that are the same as other medications prescribed for you. Read the directions carefully, and ask your doctor or other care provider to review them with you.

## 2017-06-09 ENCOUNTER — DOCUMENTATION ONLY (OUTPATIENT)
Dept: LAB | Facility: CLINIC | Age: 72
End: 2017-06-09

## 2017-06-09 DIAGNOSIS — Z12.11 COLON CANCER SCREENING: Primary | ICD-10-CM

## 2017-07-03 ENCOUNTER — CARE COORDINATION (OUTPATIENT)
Dept: GERIATRIC MEDICINE | Facility: CLINIC | Age: 72
End: 2017-07-03

## 2017-07-03 NOTE — PROGRESS NOTES
Call placed to client's daughter to schedule a home visit appointment to complete the annual HRA.  A home visit has been scheduled for Wednesday 7/26/17 at 10:00am.   has been set up through [employer].    Crystal Parson RN  [facility]  [phone]

## 2017-07-26 ENCOUNTER — CARE COORDINATION (OUTPATIENT)
Dept: GERIATRIC MEDICINE | Facility: CLINIC | Age: 72
End: 2017-07-26

## 2017-07-26 DIAGNOSIS — Z71.89 ADVANCED DIRECTIVES, COUNSELING/DISCUSSION: ICD-10-CM

## 2017-07-26 DIAGNOSIS — Z76.89 HEALTH CARE HOME: ICD-10-CM

## 2017-07-26 NOTE — PROGRESS NOTES
Home visit/Anthony Risk Assessment/EW screening completed on: 7/26/17  Member resides: Lives with family in a split level home.  Member currently receiving the following services: Case Management, MA Transportation, PCA 3.5hrs/day plus Supervision, and Incontinence Products including wipes.  See EMR for a list of client's diagnoses and medications.   Medication management: Medications reviewed:Yes. Medication management: Care giver administers medication. Medication understanding:Caregiver has understanding of regimen and is able to complete med set up/administration Yes. MTM offered and declined.  Member Mood/behavior-PHQ2 score: 0 Any needs to f/u with PCP on PHQ2 score: No   Plan of Care: Member PCA will increase to 6.25hrs/day plus Supervision. Member will also continue to receive Case Management, MA Transportation, and Incontinence Products including wipes. No additional services recommended or requested.  Follow-Up Plan: Member informed of future contact, plan to f/u with member with a 6 month telephone assessment.  Contact information shared with member and family, encouraged member to call with any questions or concerns prior to this.  See [facility] for further detailed information    Crystal Parson RN  [facility]  [phone]

## 2017-07-31 ENCOUNTER — CARE COORDINATION (OUTPATIENT)
Dept: GERIATRIC MEDICINE | Facility: CLINIC | Age: 72
End: 2017-07-31

## 2017-07-31 NOTE — PROGRESS NOTES
Received after visit chart from care coordinator.  Completed following tasks: Mailed copy of care plan to client  Updated services in access  Submitted referrals/auths for [employer]  Entered MMIS  Chart was returned to CC.     [facility]:  Emailed completed PCA assessment to [facility].  Faxed copy of PCA assessment to PCA Agency and mailed copy to member.  Faxed MD Communication to PCP.     Lou Villegas  Case Management Specialist   [facility]   [phone]

## 2017-08-15 ENCOUNTER — CARE COORDINATION (OUTPATIENT)
Dept: GERIATRIC MEDICINE | Facility: CLINIC | Age: 72
End: 2017-08-15

## 2017-08-15 DIAGNOSIS — Z76.89 HEALTH CARE HOME: ICD-10-CM

## 2017-08-15 NOTE — PROGRESS NOTES
CM received the following email notification via [facility] Secure site:    Enrique Kent ([birth year]) - [facility] - 08/09/17-06/30/18 - 6.25 hrs/day PCA, approved increase    Crystal Parson RN  [facility]  [phone]

## 2017-08-28 ENCOUNTER — CARE COORDINATION (OUTPATIENT)
Dept: GERIATRIC MEDICINE | Facility: CLINIC | Age: 72
End: 2017-08-28

## 2017-08-28 NOTE — PROGRESS NOTES
received notification that [facility] is terminating their PCA provider contract with member's agency as of 12/31/17.    CM left a message for [facility] requesting a call back.    Crystal Parson RN  [facility]  [phone]

## 2017-08-29 NOTE — PROGRESS NOTES
CM left a VM for [employer], member's daughter and PCA, informing her of needing to find a new PCA agency due to [facility] no longer having a contract with [facility] as of 12/31/17. Informed her that CM will mail out a list of PCA agencies in their area to find a new agency and to keep this CM updated once they chose a new agency and requested a call back.     CM mailed out PCA agency list.     Crystal Parson RN  [facility]  [phone]

## 2017-08-30 DIAGNOSIS — E11.42 DIABETIC POLYNEUROPATHY ASSOCIATED WITH TYPE 2 DIABETES MELLITUS (H): ICD-10-CM

## 2017-08-30 RX ORDER — HYDROCODONE BITARTRATE AND ACETAMINOPHEN 5; 325 MG/1; MG/1
TABLET ORAL
Qty: 120 TABLET | Refills: 0 | Status: SHIPPED | OUTPATIENT
Start: 2017-08-30 | End: 2017-09-29

## 2017-08-30 NOTE — TELEPHONE ENCOUNTER
Routing refill request to provider for review/approval because:  Drug not on the [facility] refill protocol   Please also see patient is asking for permission for alternative person to  Rx  Pattie Mejía RN

## 2017-08-30 NOTE — PROGRESS NOTES
CM received a call back from [employer] and she verbalized understanding of CM's VM left yesterday and will keep this CM posted.    Crystal Parson RN  [facility]  [phone]

## 2017-08-30 NOTE — TELEPHONE ENCOUNTER
Reason for Call:  Medication or medication refill:    Do you use a [facility]?  Name of the pharmacy and phone number for the current request:  Pt would like to chepe permission to her son in law Sang Thammevongsa to  hard copy of Rx     Name of the medication requested: Hydrocodone-acetaminophen (NORCO) 5-325 MG per tablet    Can we leave a detailed message on this number? YES    Phone number patient can be reached at: Home number on file [phone] (home)    Best Time: anytime    Call taken on 8/30/2017 at 9:54 AM by Chano Deleon

## 2017-09-11 ENCOUNTER — TELEPHONE (OUTPATIENT)
Dept: FAMILY MEDICINE | Facility: CLINIC | Age: 72
End: 2017-09-11

## 2017-09-12 ENCOUNTER — CARE COORDINATION (OUTPATIENT)
Dept: GERIATRIC MEDICINE | Facility: CLINIC | Age: 72
End: 2017-09-12

## 2017-09-12 DIAGNOSIS — Z76.89 HEALTH CARE HOME: ICD-10-CM

## 2017-09-12 NOTE — PROGRESS NOTES
CM received a call from Tim at [facility] reporting that they are planning to take over as the new PCA agency for member and member's daughter has already started the hiring process. Reported this CM would send in member's PCA assessment with their info on it as an agency change and then they will need to wait for the auth from [facility].    CMS emailed the PCA assessment and CPS to change agency.    Crystal Parson RN  [facility]  [phone]

## 2017-09-29 ENCOUNTER — TELEPHONE (OUTPATIENT)
Dept: FAMILY MEDICINE | Facility: CLINIC | Age: 72
End: 2017-09-29

## 2017-09-29 DIAGNOSIS — E11.42 DIABETIC POLYNEUROPATHY ASSOCIATED WITH TYPE 2 DIABETES MELLITUS (H): ICD-10-CM

## 2017-09-29 RX ORDER — HYDROCODONE BITARTRATE AND ACETAMINOPHEN 5; 325 MG/1; MG/1
TABLET ORAL
Qty: 120 TABLET | Refills: 0 | Status: SHIPPED | OUTPATIENT
Start: 2017-09-29 | End: 2017-11-02

## 2017-09-29 NOTE — TELEPHONE ENCOUNTER
This writer attempted to contact Enrique on 09/29/17      Reason for call rx at front  and left detailed message       If patient calls back:   Relay message rx at front for , (read verbatim), document that pt called and close encounter.        Aliya Reyes, CMA

## 2017-09-29 NOTE — TELEPHONE ENCOUNTER
..Reason for Call:  Medication or medication refill:    Do you use a [facility]?  Name of the pharmacy and phone number for the current request:  [facility]     Name of the medication requested: HYDROcodone-acetaminophen (NORCO) 5-325 MG per tablet    Other request: any    Can we leave a detailed message on this number? YES    Phone number patient can be reached at: Home number on file [phone] (home)    Best Time: any    Call taken on 9/29/2017 at 2:39 PM by Cuco Knight

## 2017-09-29 NOTE — TELEPHONE ENCOUNTER
Routing refill request to provider for review/approval because:  Drug not on the [facility] refill protocol   Pattie Mejía RN

## 2017-10-02 NOTE — TELEPHONE ENCOUNTER
Reason for Call:  Other prescription    Detailed comments: Pt's Daughter calling for she would like to authorize her spouse  Suzanne Calvert to come to clinic to  hard copy of Rx for Pt.     Phone Number Patient can be reached at: Home number on file [phone] (home)    Best Time: anytime    Can we leave a detailed message on this number? YES    Call taken on 10/2/2017 at 8:13 AM by Chano Deleon

## 2017-10-04 DIAGNOSIS — L30.9 DERMATITIS: ICD-10-CM

## 2017-10-04 NOTE — TELEPHONE ENCOUNTER
triamcinolone (KENALOG) 0.1 % cream      Last Written Prescription Date: 3/21/17  Last Fill Quantity: 80g,  # refills: 0   Last Office Visit with [facility], [facility] or [facility] prescribing provider: 5/17/17

## 2017-10-09 RX ORDER — TRIAMCINOLONE ACETONIDE 1 MG/G
CREAM TOPICAL
Qty: 80 G | Refills: 0 | Status: SHIPPED | OUTPATIENT
Start: 2017-10-09 | End: 2017-12-19

## 2017-10-24 DIAGNOSIS — E11.42 TYPE 2 DIABETES MELLITUS WITH DIABETIC POLYNEUROPATHY (H): ICD-10-CM

## 2017-10-25 NOTE — TELEPHONE ENCOUNTER
blood glucose monitoring (REGINE CONTOUR) test strip      Last Written Prescription Date: 7/22/16  Last Fill Quantity: 300,  # refills: 13   Last Office Visit with [facility], [facility] or [facility] prescribing provider: 5/17/17

## 2017-11-02 ENCOUNTER — TELEPHONE (OUTPATIENT)
Dept: FAMILY MEDICINE | Facility: CLINIC | Age: 72
End: 2017-11-02

## 2017-11-02 DIAGNOSIS — E11.42 DIABETIC POLYNEUROPATHY ASSOCIATED WITH TYPE 2 DIABETES MELLITUS (H): ICD-10-CM

## 2017-11-02 RX ORDER — HYDROCODONE BITARTRATE AND ACETAMINOPHEN 5; 325 MG/1; MG/1
TABLET ORAL
Qty: 120 TABLET | Refills: 0 | Status: SHIPPED | OUTPATIENT
Start: 2017-11-02 | End: 2017-11-29

## 2017-11-02 NOTE — TELEPHONE ENCOUNTER
Norco-Routing refill request to provider for review/approval because:  Drug not on the [facility] refill protocol   Zulema Chino RN.

## 2017-11-02 NOTE — TELEPHONE ENCOUNTER
Pending Prescriptions:                       Disp   Refills    HYDROcodone-acetaminophen (NORCO) 5-325 M*120 ta*0            Sig: TAKE 1 TO 2 TABLETS BY MOUTH 3 TIMES DAILY AS           NEEDED FOR MODERATE TO SVERE PAIN    Please call when ready at   Son in law Palacios will     Ok to leave voicemail  [phone]

## 2017-11-15 DIAGNOSIS — L30.9 DERMATITIS: ICD-10-CM

## 2017-11-15 NOTE — TELEPHONE ENCOUNTER
triamcinolone (KENALOG) 0.1 % cream      Last Written Prescription Date: 10/9/17  Last Fill Quantity: 80g,  # refills: 0   Last Office Visit with [facility], [facility] or [facility] prescribing provider: 5/17/17

## 2017-11-16 RX ORDER — TRIAMCINOLONE ACETONIDE 1 MG/G
CREAM TOPICAL
Qty: 80 G | Refills: 0 | Status: SHIPPED | OUTPATIENT
Start: 2017-11-16 | End: 2018-04-24

## 2017-11-21 ENCOUNTER — CARE COORDINATION (OUTPATIENT)
Dept: GERIATRIC MEDICINE | Facility: CLINIC | Age: 72
End: 2017-11-21

## 2017-11-21 NOTE — PROGRESS NOTES
CM received a call from member's daughter reporting that [employer] [facility] still has not been able to get their PCA up and running and she would like to use a different company. CM referred her to call [employer]. As another member was able to get up and running in a couple of weeks with them. She reports she will contact them tomorrow and keep this CM posted.     CM contacted [facility] and left a VM informing them that member is going to go with a different agency.    Crystal Parson RN  [facility]  [phone]

## 2017-11-27 ENCOUNTER — CARE COORDINATION (OUTPATIENT)
Dept: GERIATRIC MEDICINE | Facility: CLINIC | Age: 72
End: 2017-11-27

## 2017-11-27 NOTE — PROGRESS NOTES
CM received a VM from [facility] at [facility] requesting a call back at [phone], ext: [phone].    CM attempted to contact [employer] and left a VM requesting a call back.    Crystal Parson RN  [facility]  [phone]

## 2017-11-28 NOTE — PROGRESS NOTES
CM left another VM for [employer] requesting a call back.    Crystal Parson RN  [facility]  [phone]

## 2017-11-29 ENCOUNTER — TELEPHONE (OUTPATIENT)
Dept: FAMILY MEDICINE | Facility: CLINIC | Age: 72
End: 2017-11-29

## 2017-11-29 DIAGNOSIS — E11.42 DIABETIC POLYNEUROPATHY ASSOCIATED WITH TYPE 2 DIABETES MELLITUS (H): ICD-10-CM

## 2017-11-29 NOTE — TELEPHONE ENCOUNTER
Reason for Call:  Medication or medication refill:    Do you use a [facility]?  Name of the pharmacy and phone number for the current request:  Pt will come to  to  hard copy of Rx     Name of the medication requested: Hydrocodone-acetaminophen (NORCO) 5-325 MG  Per tablet    Can we leave a detailed message on this number? YES    Phone number patient can be reached at: Home number on file [phone] (home)    Best Time: anytime    Call taken on 11/29/2017 at 10:23 AM by Chano Deleon

## 2017-11-29 NOTE — PROGRESS NOTES
CM contacted [facility] again and inquired what they needed from this CM to get the process going as [employer] has not returned this CM's calls.  reported they needed this CM to fax over the PCA assessment and service agreement.    PCA assessment and SA Rightfaxed to [phone]. Instructed them to contact this CM asap if they need additional info.    Crystal Parson RN  [facility]  [phone]

## 2017-11-29 NOTE — TELEPHONE ENCOUNTER
Requested Prescriptions   Pending Prescriptions Disp Refills     HYDROcodone-acetaminophen (NORCO) 5-325 MG per tablet 120 tablet 0     Sig: TAKE 1 TO 2 TABLETS BY MOUTH 3 TIMES DAILY AS NEEDED FOR MODERATE TO SVERE PAIN    There is no refill protocol information for this order   Last fill 11/2/17     Last visit: 5/17/2017    Mireille Cuba RN, [facility]

## 2017-11-30 RX ORDER — HYDROCODONE BITARTRATE AND ACETAMINOPHEN 5; 325 MG/1; MG/1
TABLET ORAL
Qty: 120 TABLET | Refills: 0 | Status: SHIPPED | OUTPATIENT
Start: 2017-12-01 | End: 2017-12-19

## 2017-12-07 ENCOUNTER — CARE COORDINATION (OUTPATIENT)
Dept: GERIATRIC MEDICINE | Facility: CLINIC | Age: 72
End: 2017-12-07

## 2017-12-07 NOTE — PROGRESS NOTES
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    CP and CPS Updated with new agency information.    CMS updated.    No additional f/u needed.     Perla Roman RN-[facility]   [phone]

## 2017-12-16 DIAGNOSIS — L30.9 DERMATITIS: ICD-10-CM

## 2017-12-18 NOTE — TELEPHONE ENCOUNTER
triamcinolone (KENALOG) 0.1 % cream      Last Written Prescription Date: 11/16/17  Last Fill Quantity: 80g,  # refills: 0   Last Office Visit with [facility], [facility] or [facility] prescribing provider: 5/17/17                                         Next 5 appointments (look out 90 days)     Dec 19, 2017  5:00 PM CST   Office Visit with Lana Peguero MD   [facility] ([facility])    [address] [place] [id number] [phone]

## 2017-12-19 ENCOUNTER — OFFICE VISIT (OUTPATIENT)
Dept: FAMILY MEDICINE | Facility: CLINIC | Age: 72
End: 2017-12-19
Payer: COMMERCIAL

## 2017-12-19 VITALS
WEIGHT: 141.6 LBS | HEIGHT: 58 IN | HEART RATE: 100 BPM | RESPIRATION RATE: 14 BRPM | TEMPERATURE: 98.7 F | SYSTOLIC BLOOD PRESSURE: 102 MMHG | BODY MASS INDEX: 29.72 KG/M2 | DIASTOLIC BLOOD PRESSURE: 60 MMHG | OXYGEN SATURATION: 98 %

## 2017-12-19 DIAGNOSIS — E11.42 DIABETIC POLYNEUROPATHY ASSOCIATED WITH TYPE 2 DIABETES MELLITUS (H): ICD-10-CM

## 2017-12-19 DIAGNOSIS — I10 ESSENTIAL HYPERTENSION WITH GOAL BLOOD PRESSURE LESS THAN 140/90: ICD-10-CM

## 2017-12-19 DIAGNOSIS — E78.5 HYPERLIPIDEMIA LDL GOAL <100: ICD-10-CM

## 2017-12-19 DIAGNOSIS — E11.42 TYPE 2 DIABETES MELLITUS WITH DIABETIC POLYNEUROPATHY, WITHOUT LONG-TERM CURRENT USE OF INSULIN (H): Primary | ICD-10-CM

## 2017-12-19 LAB — HBA1C MFR BLD: 8.4 % (ref 4.3–6)

## 2017-12-19 PROCEDURE — 99207 C FOOT EXAM  NO CHARGE: CPT | Performed by: FAMILY MEDICINE

## 2017-12-19 PROCEDURE — 83036 HEMOGLOBIN GLYCOSYLATED A1C: CPT | Performed by: FAMILY MEDICINE

## 2017-12-19 PROCEDURE — 80053 COMPREHEN METABOLIC PANEL: CPT | Performed by: FAMILY MEDICINE

## 2017-12-19 PROCEDURE — 36415 COLL VENOUS BLD VENIPUNCTURE: CPT | Performed by: FAMILY MEDICINE

## 2017-12-19 PROCEDURE — 99214 OFFICE O/P EST MOD 30 MIN: CPT | Performed by: FAMILY MEDICINE

## 2017-12-19 PROCEDURE — 83721 ASSAY OF BLOOD LIPOPROTEIN: CPT | Performed by: FAMILY MEDICINE

## 2017-12-19 RX ORDER — AMLODIPINE AND BENAZEPRIL HYDROCHLORIDE 10; 40 MG/1; MG/1
CAPSULE ORAL
Qty: 90 CAPSULE | Refills: 1 | Status: SHIPPED | OUTPATIENT
Start: 2017-12-19 | End: 2018-07-30

## 2017-12-19 RX ORDER — SIMVASTATIN 20 MG
TABLET ORAL
Qty: 90 TABLET | Refills: 1 | Status: SHIPPED | OUTPATIENT
Start: 2017-12-19 | End: 2018-07-30

## 2017-12-19 RX ORDER — GABAPENTIN 600 MG/1
600 TABLET ORAL 3 TIMES DAILY
Qty: 270 TABLET | Refills: 1 | Status: CANCELLED | OUTPATIENT
Start: 2017-12-19

## 2017-12-19 RX ORDER — GLIMEPIRIDE 4 MG/1
TABLET ORAL
Qty: 180 TABLET | Refills: 1 | Status: CANCELLED | OUTPATIENT
Start: 2017-12-19

## 2017-12-19 RX ORDER — PIOGLITAZONEHYDROCHLORIDE 45 MG/1
TABLET ORAL
Qty: 90 TABLET | Refills: 1 | Status: CANCELLED | OUTPATIENT
Start: 2017-12-19

## 2017-12-19 RX ORDER — PIOGLITAZONEHYDROCHLORIDE 45 MG/1
TABLET ORAL
Qty: 90 TABLET | Refills: 1 | Status: SHIPPED | OUTPATIENT
Start: 2017-12-19 | End: 2018-08-08

## 2017-12-19 RX ORDER — AMITRIPTYLINE HYDROCHLORIDE 10 MG/1
20 TABLET ORAL AT BEDTIME
Qty: 60 TABLET | Refills: 2 | Status: CANCELLED | OUTPATIENT
Start: 2017-12-19

## 2017-12-19 RX ORDER — AMLODIPINE AND BENAZEPRIL HYDROCHLORIDE 10; 40 MG/1; MG/1
CAPSULE ORAL
Qty: 90 CAPSULE | Refills: 1 | Status: CANCELLED | OUTPATIENT
Start: 2017-12-19

## 2017-12-19 RX ORDER — HYDROCODONE BITARTRATE AND ACETAMINOPHEN 5; 325 MG/1; MG/1
TABLET ORAL
Qty: 120 TABLET | Refills: 0 | Status: CANCELLED | OUTPATIENT
Start: 2017-12-19

## 2017-12-19 RX ORDER — GLIMEPIRIDE 4 MG/1
TABLET ORAL
Qty: 180 TABLET | Refills: 1 | Status: SHIPPED | OUTPATIENT
Start: 2017-12-19 | End: 2018-07-30

## 2017-12-19 RX ORDER — SIMVASTATIN 20 MG
TABLET ORAL
Qty: 90 TABLET | Refills: 1 | Status: CANCELLED | OUTPATIENT
Start: 2017-12-19

## 2017-12-19 RX ORDER — TRIAMCINOLONE ACETONIDE 1 MG/G
CREAM TOPICAL
Qty: 80 G | Refills: 0 | Status: CANCELLED | OUTPATIENT
Start: 2017-12-19

## 2017-12-19 RX ORDER — RAMELTEON 8 MG/1
8 TABLET ORAL AT BEDTIME
Qty: 30 TABLET | Refills: 2 | Status: CANCELLED | OUTPATIENT
Start: 2017-12-19

## 2017-12-19 RX ORDER — HYDROCODONE BITARTRATE AND ACETAMINOPHEN 5; 325 MG/1; MG/1
TABLET ORAL
Qty: 120 TABLET | Refills: 0 | Status: SHIPPED | OUTPATIENT
Start: 2017-12-28 | End: 2017-12-19

## 2017-12-19 RX ORDER — PREGABALIN 100 MG/1
100 CAPSULE ORAL 2 TIMES DAILY
Qty: 180 CAPSULE | Refills: 1 | Status: SHIPPED | OUTPATIENT
Start: 2017-12-19 | End: 2018-07-30

## 2017-12-19 RX ORDER — HYDROCODONE BITARTRATE AND ACETAMINOPHEN 5; 325 MG/1; MG/1
TABLET ORAL
Qty: 120 TABLET | Refills: 0 | Status: SHIPPED | OUTPATIENT
Start: 2018-02-26 | End: 2018-04-02

## 2017-12-19 RX ORDER — HYDROCODONE BITARTRATE AND ACETAMINOPHEN 5; 325 MG/1; MG/1
TABLET ORAL
Qty: 120 TABLET | Refills: 0 | Status: SHIPPED | OUTPATIENT
Start: 2018-01-26 | End: 2017-12-19

## 2017-12-19 RX ORDER — FLUTICASONE PROPIONATE 50 MCG
SPRAY, SUSPENSION (ML) NASAL
Qty: 16 G | Refills: 3 | Status: CANCELLED | OUTPATIENT
Start: 2017-12-19

## 2017-12-19 ASSESSMENT — PAIN SCALES - GENERAL: PAINLEVEL: EXTREME PAIN (8)

## 2017-12-19 NOTE — LETTER
[address]  [address]  [phone]    December 21, 2017      Enrique Pruitt  [address] [place] [id number]      Dear Enrique,        There have been a couple of concerning changes in your lab work since last time.  Your sugar level (A1c) is too high - much different than last time.  Please make sure you take your medicines as prescribed.  And your kidney function has decreased a little - I'm guessing this has to do with the sugar being too high.  So let's plan to see each other in February to recheck on both of these.  I do not want to wait another 6 months.     TIMMY Peguero M.D.

## 2017-12-19 NOTE — NURSING NOTE
"Chief Complaint   Patient presents with     Recheck Medication       Initial /60 (BP Location: Left arm, Patient Position: Left side, Cuff Size: Adult Regular)  Pulse 100  Temp 98.7  F (37.1  C) (Oral)  Resp 14  Ht 1.467 m (4' 9.75\")  Wt 64.2 kg (141 lb 9.6 oz)  SpO2 98%  BMI 29.85 kg/m2 Estimated body mass index is 29.85 kg/(m^2) as calculated from the following:    Height as of this encounter: 1.467 m (4' 9.75\").    Weight as of this encounter: 64.2 kg (141 lb 9.6 oz).  Medication Reconciliation: complete     Will Chiara AVILA      "

## 2017-12-19 NOTE — PROGRESS NOTES
"  SUBJECTIVE:   Enrique Pruitt is a 72 year old female who presents to clinic today for the following health issues:      Diabetes Follow-up      Patient is checking blood sugars: once daily - in AM - 170 average    Diabetic concerns: None     Symptoms of hypoglycemia (low blood sugar): today -- this AM     Paresthesias (numbness or burning in feet) or sores: Yes bilateral     Date of last diabetic eye exam: pt is due    Hyperlipidemia Follow-Up      Rate your low fat/cholesterol diet?: good    Taking statin?  Yes, no muscle aches from statin    Other lipid medications/supplements?:  none    Hypertension Follow-up      Outpatient blood pressures are not being checked.    Low Salt Diet: low salt    BP Readings from Last 2 Encounters:   12/19/17 102/60   05/17/17 108/60     Hemoglobin A1C (%)   Date Value   05/03/2017 6.9 (H)   06/29/2016 7.6 (H)     LDL Cholesterol Calculated (mg/dL)   Date Value   05/03/2017 115 (H)   09/06/2012 58     LDL Cholesterol Direct (mg/dL)   Date Value   06/29/2016 67   08/12/2015 70         Amount of exercise or physical activity: some    Problems taking medications regularly: No    Medication side effects: none    Diet: regular (no restrictions)    SUBJECTIVE:  Seen today with her daughter who does most of the interpretation for follow-up of diabetes, hypertension, lipids, polyneuropathy. Take medication as prescribed without side effects. It's really the neuropathy that the biggest issue. This is required the use of gabapentin and ongoing pain medicine. Like electrical sensations that constantly buzz through her feet. Disturbed her sleep quite a bit. No weakness or incoordination.    Review of systems otherwise negative.  Past medical, family, and social history reviewed and updated in chart.    OBJECTIVE:  /60 (BP Location: Left arm, Patient Position: Left side, Cuff Size: Adult Regular)  Pulse 100  Temp 98.7  F (37.1  C) (Oral)  Resp 14  Ht 1.467 m (4' 9.75\")  Wt 64.2 " kg (141 lb 9.6 oz)  SpO2 98%  BMI 29.85 kg/m2  Alert, pleasant, upbeat, and in no apparent discomfort.  S1 and S2 normal, no murmurs, clicks, gallops or rubs. Regular rate and rhythm. Chest is clear; no wheezes or rales. No edema or JVD.  FEET: both sides normal; no swelling, tenderness or skin or vascular lesions.  Sensation is intact. Peripheral pulses are palpable. Toenails are normal.   Past labs reviewed with the patient.     ASSESSMENT / PLAN:  (E11.42) Type 2 diabetes mellitus with diabetic polyneuropathy, without long-term current use of insulin (H)  (primary encounter diagnosis)  Comment: A1c has been reasonably well-controlled blood pressure is fine. We will keep an eye on cholesterol.  Plan: FOOT EXAM  NO CHARGE [64075.114], metFORMIN         (GLUCOPHAGE) 500 MG tablet, glimepiride         (AMARYL) 4 MG tablet, sitagliptin (JANUVIA) 50         MG tablet, pioglitazone (ACTOS) 45 MG tablet,         Hemoglobin A1c, Comprehensive metabolic panel,         LDL cholesterol direct            (I10) Essential hypertension with goal blood pressure less than 140/90  Comment: Well-controlled on current regimen  Plan: amLODIPine-benazepril (LOTREL) 10-40 MG per         capsule            (E78.5) Hyperlipidemia LDL goal <100  Comment: Recheck and adjust as needed  Plan: simvastatin (ZOCOR) 20 MG tablet            (E11.42) Diabetic polyneuropathy associated with type 2 diabetes mellitus  Comment: I'd like to see if we can get Lyrica covered in place of gabapentin which doesn't seem to be doing the trick. If not we can go back to gabapentin and consider increasing the dosage but 600 mg 3 times a day is quite a bit already  Plan: HYDROcodone-acetaminophen (NORCO) 5-325 MG per         tablet, pregabalin (LYRICA) 100 MG capsule,         DISCONTINUED: HYDROcodone-acetaminophen (NORCO)        5-325 MG per tablet, DISCONTINUED:         HYDROcodone-acetaminophen (NORCO) 5-325 MG per         tablet            Follow up 6 months  or as needed  S. Titi Peguero MD    (Chart documentation completed in part with Dragon voice-recognition software.  Even though reviewed some grammatical, spelling, and word errors may remain.)

## 2017-12-19 NOTE — MR AVS SNAPSHOT
"              After Visit Summary   12/19/2017    Enrique Pruitt    MRN: [id number]           Patient Information     Date Of Birth          1945        Visit Information        Provider Department      12/19/2017 5:00 PM Lana Peguero MD [facility]        Today's Diagnoses     Type 2 diabetes mellitus with diabetic polyneuropathy, without long-term current use of insulin (H)    -  1    Essential hypertension with goal blood pressure less than 140/90        Hyperlipidemia LDL goal <100        Diabetic polyneuropathy associated with type 2 diabetes mellitus           Follow-ups after your visit        Follow-up notes from your care team     Return in about 6 months (around 6/19/2018).      Who to contact     If you have questions or need follow up information about today's clinic visit or your schedule please contact [facility] directly at [phone].  Normal or non-critical lab and imaging results will be communicated to you by MyChart, letter or phone within 4 business days after the clinic has received the results. If you do not hear from us within 7 days, please contact the clinic through MyChart or phone. If you have a critical or abnormal lab result, we will notify you by phone as soon as possible.  Submit refill requests through [employer] or call your pharmacy and they will forward the refill request to us. Please allow 3 business days for your refill to be completed.          Additional Information About Your Visit        MyChart Information     [employer] lets you send messages to your doctor, view your test results, renew your prescriptions, schedule appointments and more. To sign up, go to [email or web address] . Click on \"Log in\" on the left side of the screen, which will take you to the Welcome page. Then click on \"Sign up Now\" on the right side of the page.     You will be asked to enter the access code listed below, as well as some personal " "information. Please follow the directions to create your username and password.     Your access code is: [email or web address]  Expires: 3/19/2018  5:07 PM     Your access code will  in 90 days. If you need help or a new code, please call your [facility] clinic or [phone].        Care EveryWhere ID     This is your Care EveryWhere ID. This could be used by other organizations to access your [facility] medical records  [id number]        Your Vitals Were     Pulse Temperature Respirations Height Pulse Oximetry BMI (Body Mass Index)    100 98.7  F (37.1  C) (Oral) 14 1.467 m (4' 9.75\") 98% 29.85 kg/m2       Blood Pressure from Last 3 Encounters:   17 102/60   17 108/60   17 126/80    Weight from Last 3 Encounters:   17 64.2 kg (141 lb 9.6 oz)   17 70.3 kg (154 lb 14.4 oz)   16 69.8 kg (153 lb 14.4 oz)              We Performed the Following     Comprehensive metabolic panel     FOOT EXAM  NO CHARGE [44218.114]     Hemoglobin A1c     LDL cholesterol direct          Today's Medication Changes          These changes are accurate as of: 17  5:08 PM.  If you have any questions, ask your nurse or doctor.               Start taking these medicines.        Dose/Directions    HYDROcodone-acetaminophen 5-325 MG per tablet   Commonly known as:  NORCO   Used for:  Diabetic polyneuropathy associated with type 2 diabetes mellitus (H)   Started by:  Lana Peguero MD        Start taking on:  2018   TAKE 1 TO 2 TABLETS BY MOUTH 3 TIMES DAILY AS NEEDED FOR MODERATE TO SVERE PAIN   Quantity:  120 tablet   Refills:  0       pregabalin 100 MG capsule   Commonly known as:  LYRICA   Used for:  Diabetic polyneuropathy associated with type 2 diabetes mellitus (H)   Replaces:  gabapentin 600 MG tablet   Started by:  Lana Peguero MD        Dose:  100 mg   Take 1 capsule (100 mg) by mouth 2 times daily   Quantity:  180 capsule   Refills:  1         These medicines have changed or " have updated prescriptions.        Dose/Directions    triamcinolone 0.1 % cream   Commonly known as:  KENALOG   This may have changed:  Another medication with the same name was removed. Continue taking this medication, and follow the directions you see here.   Used for:  Dermatitis   Changed by:  Lana Peguero MD        APPLY EXTERNALLY TO THE AFFECTED AREA TWICE DAILY AS NEEDED FOR IRRITATION   Quantity:  80 g   Refills:  0         Stop taking these medicines if you haven't already. Please contact your care team if you have questions.     amitriptyline 10 MG tablet   Commonly known as:  ELAVIL   Stopped by:  Lana Peguero MD           gabapentin 600 MG tablet   Commonly known as:  NEURONTIN   Replaced by:  pregabalin 100 MG capsule   Stopped by:  Lana Peguero MD                Where to get your medicines      These medications were sent to [facility] [id number] [place] [address] AT [address], [facility] [address]     Phone:  [phone]     amLODIPine-benazepril 10-40 MG per capsule    glimepiride 4 MG tablet    metFORMIN 500 MG tablet    pioglitazone 45 MG tablet    simvastatin 20 MG tablet    sitagliptin 50 MG tablet         Some of these will need a paper prescription and others can be bought over the counter.  Ask your nurse if you have questions.     Bring a paper prescription for each of these medications     HYDROcodone-acetaminophen 5-325 MG per tablet    pregabalin 100 MG capsule                Primary Care Provider Office Phone # Fax #    Lana Peguero [phone] [address] [facility] [address]        Equal Access to Services     [facility]: Hadmoon fernandes Sokarishma, waaxda luqadaha, qaybta kaalmada danya, bacilio varela. So [facility] [phone].    ATENCIÓN: Si habla español, tiene a ulrich disposición servicios gratuitos de asistencia  lingüísticaRufino Mckeon al [phone].    We comply with applicable federal civil rights laws and [place] laws. We do not discriminate on the basis of race, color, national origin, age, disability, sex, sexual orientation, or gender identity.            Thank you!     Thank you for choosing [facility]  for your care. Our goal is always to provide you with excellent care. Hearing back from our patients is one way we can continue to improve our services. Please take a few minutes to complete the written survey that you may receive in the mail after your visit with us. Thank you!             Your Updated Medication List - Protect others around you: Learn how to safely use, store and throw away your medicines at www.disposemymeds.org.          This list is accurate as of: 12/19/17  5:08 PM.  Always use your most recent med list.                   Brand Name Dispense Instructions for use Diagnosis    amLODIPine-benazepril 10-40 MG per capsule    LOTREL    90 capsule    TAKE 1 CAPSULE BY MOUTH DAILY    Essential hypertension with goal blood pressure less than 140/90       REGINE CONTOUR test strip   Generic drug:  blood glucose monitoring     300 strip    USE TO TEST THREE TIMES DAILY OR AS DIRECTED    Type 2 diabetes mellitus with diabetic polyneuropathy (H)       docusate sodium 100 MG capsule    COLACE    30 capsule    Take 1 capsule by mouth 2 times daily as needed for constipation.    Constipation       Fluocinonide 0.1 % Crea     60 g    Externally apply topically 2 times daily as needed    Dermatitis       fluticasone 50 MCG/ACT spray    FLONASE    16 g    SPRAY 1-2 SPRAYS INTO BOTH NOSTRILS DAILY    Chronic rhinitis       glimepiride 4 MG tablet    AMARYL    180 tablet    TAKE 1 TABLET (4 MG) BY MOUTH 2 TIMES DAILY    Type 2 diabetes mellitus with diabetic polyneuropathy, without long-term current use of insulin (H)       HYDROcodone-acetaminophen 5-325 MG per tablet   Start taking on:  2/26/2018     NORCO    120 tablet    TAKE 1 TO 2 TABLETS BY MOUTH 3 TIMES DAILY AS NEEDED FOR MODERATE TO SVERE PAIN    Diabetic polyneuropathy associated with type 2 diabetes mellitus (H)       metFORMIN 500 MG tablet    GLUCOPHAGE    360 tablet    Take 2 tablets (1,000 mg) by mouth 2 times daily (with meals)    Type 2 diabetes mellitus with diabetic polyneuropathy, without long-term current use of insulin (H)       * order for DME     1 Device    Accu-Check Glucometer or other as covered by insurance.    Type 2 diabetes, HbA1C goal < 8% (H)       * order for DME     1 Units    Accu-Check or other as covered by insurance. Test strips and lancets - per device and insurance plan. Tests once daily. Disp: 100 each Refill: 5 each    Type 2 diabetes, HbA1C goal < 8% (H)       * order for DME     1 each    Equipment being ordered: Depends undergarments 80 per month x 1 year    Urinary incontinence, functional       * order for DME     1 each    Equipment being ordered: handheld shower head    Diabetic neuropathy (H)       * order for DME     1 each    Equipment being ordered: lift chair    Diabetic neuropathy (H)       * order for DME     1 each    Wheelchair.    Gait abnormality, Diabetic neuropathy (H)       * order for DME     1 each    Equipment being ordered: automatic BP cuff    Hypertension goal BP (blood pressure) < 140/90       * order for DME     1 Units    Equipment being ordered: diabetic shoes    Type 2 diabetes, HbA1C goal < 8% (H), Diabetic neuropathy (H)       * order for DME     1 Device    Equipment being ordered: automatic BP cuff    Hypertension goal BP (blood pressure) < 140/90       * order for DME     1 Device    Equipment being ordered: glucometer - one touch ultra or per insurance  Tests daily    Type 2 diabetes, HbA1C goal < 8% (H)       * order for DME     1 each    Equipment being ordered: Test strips and lancets - per device and insurance plan. Tests daily  Disp: 100 Refill: 5    Type 2 diabetes, HbA1C goal  < 8% (H)       * order for DME     1 Device    Equipment being ordered: Life Alert    Type 2 diabetes, HbA1C goal < 8% (H)       * order for DME     200 Units    Equipment being ordered: Test strips - accu-check smartview Tests twice daily due to uncontrolled sugar    Type 2 diabetes mellitus with diabetic polyneuropathy, without long-term current use of insulin (H)       pioglitazone 45 MG tablet    ACTOS    90 tablet    TAKE 1 TABLET (50 MG) BY MOUTH DAILY    Type 2 diabetes mellitus with diabetic polyneuropathy, without long-term current use of insulin (H)       pregabalin 100 MG capsule    LYRICA    180 capsule    Take 1 capsule (100 mg) by mouth 2 times daily    Diabetic polyneuropathy associated with type 2 diabetes mellitus (H)       ramelteon 8 MG tablet    ROZEREM    30 tablet    Take 1 tablet (8 mg) by mouth At Bedtime    Chronic insomnia       simvastatin 20 MG tablet    ZOCOR    90 tablet    TAKE 1 TABLET (20 MG) BY MOUTH AT BEDTIME    Hyperlipidemia LDL goal <100       sitagliptin 50 MG tablet    JANUVIA    90 tablet    TAKE 1 TABLET BY MOUTH EVERY DAY.    Type 2 diabetes mellitus with diabetic polyneuropathy, without long-term current use of insulin (H)       * STATIN NOT PRESCRIBED (INTENTIONAL)     0 each         triamcinolone 0.1 % cream    KENALOG    80 g    APPLY EXTERNALLY TO THE AFFECTED AREA TWICE DAILY AS NEEDED FOR IRRITATION    Dermatitis       * Notice:  This list has 14 medication(s) that are the same as other medications prescribed for you. Read the directions carefully, and ask your doctor or other care provider to review them with you.

## 2017-12-20 LAB
ALBUMIN SERPL-MCNC: 3.8 G/DL (ref 3.4–5)
ALP SERPL-CCNC: 121 U/L (ref 40–150)
ALT SERPL W P-5'-P-CCNC: 15 U/L (ref 0–50)
ANION GAP SERPL CALCULATED.3IONS-SCNC: 8 MMOL/L (ref 3–14)
AST SERPL W P-5'-P-CCNC: 14 U/L (ref 0–45)
BILIRUB SERPL-MCNC: 0.2 MG/DL (ref 0.2–1.3)
BUN SERPL-MCNC: 23 MG/DL (ref 7–30)
CALCIUM SERPL-MCNC: 9.3 MG/DL (ref 8.5–10.1)
CHLORIDE SERPL-SCNC: 105 MMOL/L (ref 94–109)
CO2 SERPL-SCNC: 26 MMOL/L (ref 20–32)
CREAT SERPL-MCNC: 1.24 MG/DL (ref 0.52–1.04)
GFR SERPL CREATININE-BSD FRML MDRD: 42 ML/MIN/1.7M2
GLUCOSE SERPL-MCNC: 118 MG/DL (ref 70–99)
LDLC SERPL DIRECT ASSAY-MCNC: 155 MG/DL
POTASSIUM SERPL-SCNC: 4.6 MMOL/L (ref 3.4–5.3)
PROT SERPL-MCNC: 7.7 G/DL (ref 6.8–8.8)
SODIUM SERPL-SCNC: 139 MMOL/L (ref 133–144)

## 2017-12-20 RX ORDER — TRIAMCINOLONE ACETONIDE 1 MG/G
CREAM TOPICAL
Qty: 80 G | Refills: 0 | Status: SHIPPED | OUTPATIENT
Start: 2017-12-20 | End: 2018-04-24

## 2017-12-20 NOTE — TELEPHONE ENCOUNTER
Prescription approved per [facility] Refill Protocol.    Nikki Tobin RN   [facility]

## 2017-12-21 NOTE — PROGRESS NOTES
Please mail results and note to patient:    Enrique,  There have been a couple of concerning changes in your lab work since last time.  Your sugar level (A1c) is too high - much different than last time.  Please make sure you take your medicines as prescribed.  And your kidney function has decreased a little - I'm guessing this has to do with the sugar being too high.  So let's plan to see each other in February to recheck on both of these.  I do not want to wait another 6 months.  TIMMY Peguero M.D.

## 2017-12-30 DIAGNOSIS — E11.42 TYPE 2 DIABETES MELLITUS WITH DIABETIC POLYNEUROPATHY (H): ICD-10-CM

## 2018-01-02 ENCOUNTER — CARE COORDINATION (OUTPATIENT)
Dept: GERIATRIC MEDICINE | Facility: CLINIC | Age: 73
End: 2018-01-02

## 2018-01-02 NOTE — TELEPHONE ENCOUNTER
Requested Prescriptions   Pending Prescriptions Disp Refills     REGINE CONTOUR test strip [Pharmacy Med Name: CONTOUR TEST STRIPS 100'S]  Last Written Prescription Date:  10/26/17  Last Fill Quantity: 300 strip,  # refills: 0   Last Office Visit with G, P or [facility] prescribing provider:  12/19/17  Future Office Visit:    300 strip 0     Sig: USE TO TEST THREE TIMES DAILY OR AS DIRECTED    Diabetic Supplies Protocol Passed    12/30/2017 12:53 PM       Passed - Patient is 18 years of age or older       Passed - Patient has had appt within past 6 mos    IV to PO - Antibiotics     None

## 2018-01-03 NOTE — PROGRESS NOTES
1/2/2018  Covering CM attempted to contact members daughter to complete 6 month telephone assessment.  VM message left requesting a return call.     Perla Roman RN-[facility]   [phone]    11/11/2018  Covering CM attempted to contact members daughter to complete 6 month telephone assessment.  VM message left requesting a return call.     Perla Roman RN-[facility]   [phone]      1/15/2018  Covering CM attempted to contact members daughter to complete 6 month telephone assessment.  VM message left requesting a return call. CMS to mail 6 month unable to contact message.    Perla Roman RN-[facility]   [phone]

## 2018-01-04 NOTE — TELEPHONE ENCOUNTER
Prescription approved per [facility] Refill Protocol.    Nikki Tobin RN   [facility]

## 2018-01-21 DIAGNOSIS — L30.9 DERMATITIS: ICD-10-CM

## 2018-01-22 NOTE — TELEPHONE ENCOUNTER
"Requested Prescriptions   Pending Prescriptions Disp Refills     triamcinolone (KENALOG) 0.1 % cream [Pharmacy Med Name: TRIAMCINOLONE 0.1% CREAM 80GM] 80 g 0     Sig: APPLY EXTERNALLY TO THE AFFECTED AREA TWICE DAILY AS NEEDED FOR IRRITATION    Topical Steroid Protocol Passed    1/21/2018 10:18 AM       Passed - Patient is age 6 or older       Passed - Authorizing prescriber's most recent note related to this medication read.       Passed - High potency steroid not ordered       Passed - Recent or future visit with authorizing provider's specialty    Patient had office visit in the last year or has a visit in the next 30 days with authorizing provider.  See \"Patient Info\" tab in inbasket, or \"Choose Columns\" in Meds & Orders section of the refill encounter.             triamcinolone (KENALOG) 0.1 % cream  Last Written Prescription Date:  12/20/17  Last Fill Quantity: 80g,  # refills: 0   Last Office Visit with [facility], [facility] or [facility] prescribing provider:  12/19/17   Future Office Visit:       "

## 2018-01-23 RX ORDER — TRIAMCINOLONE ACETONIDE 1 MG/G
CREAM TOPICAL
Qty: 80 G | Refills: 2 | Status: SHIPPED | OUTPATIENT
Start: 2018-01-23 | End: 2018-09-10

## 2018-04-02 ENCOUNTER — TELEPHONE (OUTPATIENT)
Dept: FAMILY MEDICINE | Facility: CLINIC | Age: 73
End: 2018-04-02

## 2018-04-02 DIAGNOSIS — E11.42 DIABETIC POLYNEUROPATHY ASSOCIATED WITH TYPE 2 DIABETES MELLITUS (H): ICD-10-CM

## 2018-04-02 RX ORDER — HYDROCODONE BITARTRATE AND ACETAMINOPHEN 5; 325 MG/1; MG/1
TABLET ORAL
Qty: 120 TABLET | Refills: 0 | Status: SHIPPED | OUTPATIENT
Start: 2018-04-02 | End: 2018-04-25

## 2018-04-02 NOTE — TELEPHONE ENCOUNTER
....Reason for Call:  Other     Detailed comments: Patient's son-in-law called,  Natanael he was requesting patient's refill on HYDROCODONE.    Phone Number Patient can be reached at: [phone]    Best Time: anytime    Can we leave a detailed message on this number? YES    Call taken on 4/2/2018 at 4:24 PM by Roque Sanchez

## 2018-04-02 NOTE — TELEPHONE ENCOUNTER
Refilled - BUT she needs to see me this month to recheck diabetes.  It was much too high in December.  So have her schedule

## 2018-04-02 NOTE — TELEPHONE ENCOUNTER
Routing refill request to provider for review/approval because:  Drug not on the [facility] refill protocol     Zaida Nunez RN

## 2018-04-02 NOTE — TELEPHONE ENCOUNTER
This writer attempted to contact 1 on 04/02/18      Reason for call rx refilled  and left detailed message.      If patient calls back:   Relay message she needs to see me this month to recheck diabetes.  It was much too high in December.  So have her schedule , (read verbatim), document that pt called and close encounter    Eliana Stapleton, Medical Assistant

## 2018-04-05 ENCOUNTER — DOCUMENTATION ONLY (OUTPATIENT)
Dept: LAB | Facility: CLINIC | Age: 73
End: 2018-04-05

## 2018-04-05 DIAGNOSIS — Z12.11 COLON CANCER SCREENING: Primary | ICD-10-CM

## 2018-04-05 NOTE — PROGRESS NOTES
Please review, attach a diagnosis and sign the pended order so the FIT kit can be mailed to the patient.    Thank you,    [facility]

## 2018-04-21 DIAGNOSIS — L30.9 DERMATITIS: ICD-10-CM

## 2018-04-23 NOTE — TELEPHONE ENCOUNTER
"Requested Prescriptions   Pending Prescriptions Disp Refills     triamcinolone (KENALOG) 0.1 % cream [Pharmacy Med Name: TRIAMCINOLONE 0.1% CREAM 80GM] 80 g 0     Sig: APPLY EXTERNALLY TO THE AFFECTED AREA TWICE DAILY AS NEEDED FOR IRRITATION    Topical Steroid Protocol Passed    4/21/2018  3:38 AM       Passed - Patient is age 6 or older       Passed - Authorizing prescriber's most recent note related to this medication read.       Passed - High potency steroid not ordered       Passed - Recent (12 mo) or future (30 days) visit within the authorizing provider's specialty    Patient had office visit in the last 12 months or has a visit in the next 30 days with authorizing provider or within the authorizing provider's specialty.  See \"Patient Info\" tab in inbasket, or \"Choose Columns\" in Meds & Orders section of the refill encounter.            triamcinolone (KENALOG) 0.1 % cream  Last Written Prescription Date:  1/23/18  Last Fill Quantity: 80g,  # refills: 2   Last office visit: 12/19/2017 with prescribing provider:  Dr. Peguero   Future Office Visit:      "

## 2018-04-24 RX ORDER — TRIAMCINOLONE ACETONIDE 1 MG/G
CREAM TOPICAL
Qty: 80 G | Refills: 0 | Status: SHIPPED | OUTPATIENT
Start: 2018-04-24 | End: 2018-09-10

## 2018-04-24 NOTE — TELEPHONE ENCOUNTER
Routing refill request to provider for review/approval because:  Should patient need refills this soon?    Provider please review medication list. There are 3 current/active  Kenalog cream prescriptions on file for this patient.      Lyssa Singh RN

## 2018-04-25 ENCOUNTER — TELEPHONE (OUTPATIENT)
Dept: FAMILY MEDICINE | Facility: CLINIC | Age: 73
End: 2018-04-25

## 2018-04-25 DIAGNOSIS — E11.42 DIABETIC POLYNEUROPATHY ASSOCIATED WITH TYPE 2 DIABETES MELLITUS (H): ICD-10-CM

## 2018-04-25 DIAGNOSIS — G89.4 CHRONIC PAIN SYNDROME: Primary | ICD-10-CM

## 2018-04-25 DIAGNOSIS — E11.42 TYPE 2 DIABETES MELLITUS WITH DIABETIC POLYNEUROPATHY, WITHOUT LONG-TERM CURRENT USE OF INSULIN (H): ICD-10-CM

## 2018-04-25 RX ORDER — HYDROCODONE BITARTRATE AND ACETAMINOPHEN 5; 325 MG/1; MG/1
TABLET ORAL
Qty: 120 TABLET | Refills: 0 | Status: SHIPPED | OUTPATIENT
Start: 2018-04-30 | End: 2018-07-06

## 2018-04-25 NOTE — TELEPHONE ENCOUNTER
Pending Prescriptions:                       Disp   Refills    HYDROcodone-acetaminophen (NORCO) 5-325 M*120 ta*0            Sig: TAKE 1 TO 2 TABLETS BY MOUTH 3 TIMES DAILY AS           NEEDED FOR MODERATE TO SVERE PAIN     call when ready at   [phone]    Ok to leave message stating ready    Thank you

## 2018-04-25 NOTE — TELEPHONE ENCOUNTER
This writer attempted to contact pt on 04/25/18      Reason for call  Relay message and left message.      If patient calls back:   Relay message remind pt to turn FIT test, (read verbatim), document that pt called and close encounter        Luba Paez MA

## 2018-04-25 NOTE — TELEPHONE ENCOUNTER
Prescription refilled, but due for recheck of her A1c because it was too high in December.  So I would like the patient to come to  the prescription so she can stop by the lab.  We are also going to check a urine toxicology screen.

## 2018-04-25 NOTE — TELEPHONE ENCOUNTER
HYDROcodone-acetaminophen (NORCO) 5-325 MG per tablet 120 tablet 0 4/2/2018  No   Sig: TAKE 1 TO 2 TABLETS BY MOUTH 3 TIMES DAILY AS NEEDED FOR MODERATE TO SVERE PAIN   Class: Local Print     Routing refill request to provider for review/approval because:  Drug not on the [facility] refill protocol.    Mireille Cuba RN, [facility]

## 2018-04-27 DIAGNOSIS — G89.4 CHRONIC PAIN SYNDROME: ICD-10-CM

## 2018-04-27 DIAGNOSIS — E11.42 TYPE 2 DIABETES MELLITUS WITH DIABETIC POLYNEUROPATHY, WITHOUT LONG-TERM CURRENT USE OF INSULIN (H): ICD-10-CM

## 2018-04-27 LAB — HBA1C MFR BLD: 6.8 % (ref 0–5.6)

## 2018-04-27 PROCEDURE — 80307 DRUG TEST PRSMV CHEM ANLYZR: CPT | Mod: 90 | Performed by: FAMILY MEDICINE

## 2018-04-27 PROCEDURE — 83036 HEMOGLOBIN GLYCOSYLATED A1C: CPT | Performed by: FAMILY MEDICINE

## 2018-04-27 PROCEDURE — 99000 SPECIMEN HANDLING OFFICE-LAB: CPT | Performed by: FAMILY MEDICINE

## 2018-04-27 PROCEDURE — 36415 COLL VENOUS BLD VENIPUNCTURE: CPT | Performed by: FAMILY MEDICINE

## 2018-05-02 LAB — COMPREHEN DRUG ANALYSIS UR: NORMAL

## 2018-05-15 ENCOUNTER — OFFICE VISIT (OUTPATIENT)
Dept: FAMILY MEDICINE | Facility: CLINIC | Age: 73
End: 2018-05-15
Payer: COMMERCIAL

## 2018-05-15 VITALS
OXYGEN SATURATION: 99 % | HEIGHT: 57 IN | DIASTOLIC BLOOD PRESSURE: 66 MMHG | WEIGHT: 159 LBS | TEMPERATURE: 97.6 F | HEART RATE: 96 BPM | SYSTOLIC BLOOD PRESSURE: 110 MMHG | BODY MASS INDEX: 34.3 KG/M2

## 2018-05-15 DIAGNOSIS — D64.9 ANEMIA, UNSPECIFIED TYPE: ICD-10-CM

## 2018-05-15 DIAGNOSIS — E11.42 TYPE 2 DIABETES MELLITUS WITH DIABETIC POLYNEUROPATHY, WITHOUT LONG-TERM CURRENT USE OF INSULIN (H): ICD-10-CM

## 2018-05-15 DIAGNOSIS — R10.84 GENERALIZED ABDOMINAL PAIN: Primary | ICD-10-CM

## 2018-05-15 DIAGNOSIS — E11.42 DIABETIC POLYNEUROPATHY ASSOCIATED WITH TYPE 2 DIABETES MELLITUS (H): ICD-10-CM

## 2018-05-15 DIAGNOSIS — K21.9 GASTROESOPHAGEAL REFLUX DISEASE WITHOUT ESOPHAGITIS: ICD-10-CM

## 2018-05-15 LAB
ALBUMIN SERPL-MCNC: 3.8 G/DL (ref 3.4–5)
ALBUMIN UR-MCNC: NEGATIVE MG/DL
ALP SERPL-CCNC: 98 U/L (ref 40–150)
ALT SERPL W P-5'-P-CCNC: 16 U/L (ref 0–50)
ANION GAP SERPL CALCULATED.3IONS-SCNC: 4 MMOL/L (ref 3–14)
APPEARANCE UR: CLEAR
AST SERPL W P-5'-P-CCNC: 16 U/L (ref 0–45)
BILIRUB SERPL-MCNC: 0.3 MG/DL (ref 0.2–1.3)
BILIRUB UR QL STRIP: NEGATIVE
BUN SERPL-MCNC: 36 MG/DL (ref 7–30)
CALCIUM SERPL-MCNC: 9.2 MG/DL (ref 8.5–10.1)
CHLORIDE SERPL-SCNC: 109 MMOL/L (ref 94–109)
CO2 SERPL-SCNC: 28 MMOL/L (ref 20–32)
COLOR UR AUTO: YELLOW
CREAT SERPL-MCNC: 1.1 MG/DL (ref 0.52–1.04)
ERYTHROCYTE [DISTWIDTH] IN BLOOD BY AUTOMATED COUNT: 15.5 % (ref 10–15)
GFR SERPL CREATININE-BSD FRML MDRD: 49 ML/MIN/1.7M2
GLUCOSE SERPL-MCNC: 98 MG/DL (ref 70–99)
GLUCOSE UR STRIP-MCNC: NEGATIVE MG/DL
HCT VFR BLD AUTO: 29.6 % (ref 35–47)
HGB BLD-MCNC: 9.9 G/DL (ref 11.7–15.7)
HGB UR QL STRIP: ABNORMAL
KETONES UR STRIP-MCNC: NEGATIVE MG/DL
LEUKOCYTE ESTERASE UR QL STRIP: NEGATIVE
LIPASE SERPL-CCNC: 179 U/L (ref 73–393)
MCH RBC QN AUTO: 26.7 PG (ref 26.5–33)
MCHC RBC AUTO-ENTMCNC: 33.4 G/DL (ref 31.5–36.5)
MCV RBC AUTO: 80 FL (ref 78–100)
NITRATE UR QL: NEGATIVE
PH UR STRIP: 7 PH (ref 5–7)
PLATELET # BLD AUTO: 308 10E9/L (ref 150–450)
POTASSIUM SERPL-SCNC: 5.2 MMOL/L (ref 3.4–5.3)
PROT SERPL-MCNC: 7.7 G/DL (ref 6.8–8.8)
RBC # BLD AUTO: 3.71 10E12/L (ref 3.8–5.2)
RBC #/AREA URNS AUTO: ABNORMAL /HPF
SODIUM SERPL-SCNC: 141 MMOL/L (ref 133–144)
SOURCE: ABNORMAL
SP GR UR STRIP: 1.01 (ref 1–1.03)
UROBILINOGEN UR STRIP-ACNC: 0.2 EU/DL (ref 0.2–1)
WBC # BLD AUTO: 6.6 10E9/L (ref 4–11)
WBC #/AREA URNS AUTO: ABNORMAL /HPF

## 2018-05-15 PROCEDURE — 81001 URINALYSIS AUTO W/SCOPE: CPT | Performed by: FAMILY MEDICINE

## 2018-05-15 PROCEDURE — 80053 COMPREHEN METABOLIC PANEL: CPT | Performed by: FAMILY MEDICINE

## 2018-05-15 PROCEDURE — 36415 COLL VENOUS BLD VENIPUNCTURE: CPT | Performed by: FAMILY MEDICINE

## 2018-05-15 PROCEDURE — 82728 ASSAY OF FERRITIN: CPT | Performed by: FAMILY MEDICINE

## 2018-05-15 PROCEDURE — 85027 COMPLETE CBC AUTOMATED: CPT | Performed by: FAMILY MEDICINE

## 2018-05-15 PROCEDURE — 99214 OFFICE O/P EST MOD 30 MIN: CPT | Performed by: FAMILY MEDICINE

## 2018-05-15 PROCEDURE — 83690 ASSAY OF LIPASE: CPT | Performed by: FAMILY MEDICINE

## 2018-05-15 RX ORDER — HYDROCODONE BITARTRATE AND ACETAMINOPHEN 5; 325 MG/1; MG/1
TABLET ORAL
Qty: 120 TABLET | Refills: 0 | Status: CANCELLED | OUTPATIENT
Start: 2018-05-15

## 2018-05-15 ASSESSMENT — ANXIETY QUESTIONNAIRES
7. FEELING AFRAID AS IF SOMETHING AWFUL MIGHT HAPPEN: MORE THAN HALF THE DAYS
GAD7 TOTAL SCORE: 13
1. FEELING NERVOUS, ANXIOUS, OR ON EDGE: MORE THAN HALF THE DAYS
3. WORRYING TOO MUCH ABOUT DIFFERENT THINGS: MORE THAN HALF THE DAYS
2. NOT BEING ABLE TO STOP OR CONTROL WORRYING: MORE THAN HALF THE DAYS
5. BEING SO RESTLESS THAT IT IS HARD TO SIT STILL: MORE THAN HALF THE DAYS
6. BECOMING EASILY ANNOYED OR IRRITABLE: SEVERAL DAYS

## 2018-05-15 ASSESSMENT — PATIENT HEALTH QUESTIONNAIRE - PHQ9: 5. POOR APPETITE OR OVEREATING: MORE THAN HALF THE DAYS

## 2018-05-15 ASSESSMENT — PAIN SCALES - GENERAL: PAINLEVEL: MODERATE PAIN (5)

## 2018-05-15 NOTE — TELEPHONE ENCOUNTER
Requested Prescriptions   Pending Prescriptions Disp Refills     HYDROcodone-acetaminophen (NORCO) 5-325 MG per tablet        Last Written Prescription Date:  4/30/18  Last Fill Quantity: 120 ,   # refills: 0  Last Office Visit: Dr. Lovelace 5/15/18  Future Office visit:     Routing refill request to provider for review/approval because:  Drug not on the [facility], [facility] or [facility] refill protocol or controlled substance 120 tablet 0     Sig: TAKE 1 TO 2 TABLETS BY MOUTH 3 TIMES DAILY AS NEEDED FOR MODERATE TO SVERE PAIN    There is no refill protocol information for this order

## 2018-05-15 NOTE — MR AVS SNAPSHOT
"              After Visit Summary   5/15/2018    Enrique Pruitt    MRN: [id number]           Patient Information     Date Of Birth          1945        Visit Information        Provider Department      5/15/2018 11:20 AM Judy Lovelace MD [facility]        Today's Diagnoses     Generalized abdominal pain    -  1    Gastroesophageal reflux disease without esophagitis          Care Instructions    Labs and urine test today. I will notify you of results when I receive them.     Begin Prilosec 30 to 60 minutes prior to your evening meal. Take as directed.      Follow up if fever, worsening abdominal pain, vomiting, or diarrhea symptoms occur.           Follow-ups after your visit        Who to contact     If you have questions or need follow up information about today's clinic visit or your schedule please contact [facility] directly at [phone].  Normal or non-critical lab and imaging results will be communicated to you by MyChart, letter or phone within 4 business days after the clinic has received the results. If you do not hear from us within 7 days, please contact the clinic through MyChart or phone. If you have a critical or abnormal lab result, we will notify you by phone as soon as possible.  Submit refill requests through [employer] or call your pharmacy and they will forward the refill request to us. Please allow 3 business days for your refill to be completed.          Additional Information About Your Visit        [employer] Information     [employer] lets you send messages to your doctor, view your test results, renew your prescriptions, schedule appointments and more. To sign up, go to [email or web address] . Click on \"Log in\" on the left side of the screen, which will take you to the Welcome page. Then click on \"Sign up Now\" on the right side of the page.     You will be asked to enter the access code listed below, as well as some personal information. Please " "follow the directions to create your username and password.     Your access code is: [email or web address]  Expires: 2018 11:46 AM     Your access code will  in 90 days. If you need help or a new code, please call your [place] clinic or [phone].        Care EveryWhere ID     This is your Care EveryWhere ID. This could be used by other organizations to access your [place] medical records  [id number]        Your Vitals Were     Pulse Temperature Height Pulse Oximetry Breastfeeding? BMI (Body Mass Index)    96 97.6  F (36.4  C) (Oral) 1.448 m (4' 9\") 99% No 34.41 kg/m2       Blood Pressure from Last 3 Encounters:   05/15/18 110/66   17 102/60   17 108/60    Weight from Last 3 Encounters:   05/15/18 72.1 kg (159 lb)   17 64.2 kg (141 lb 9.6 oz)   17 70.3 kg (154 lb 14.4 oz)              We Performed the Following     *UA reflex to Microscopic     CBC with platelets     Comprehensive metabolic panel     Lipase          Today's Medication Changes          These changes are accurate as of 5/15/18 11:46 AM.  If you have any questions, ask your nurse or doctor.               Start taking these medicines.        Dose/Directions    omeprazole 20 MG CR capsule   Commonly known as:  priLOSEC   Used for:  Gastroesophageal reflux disease without esophagitis   Started by:  Judy Lovelace MD        Dose:  20 mg   Take 1 capsule (20 mg) by mouth daily Take 30-60 min prior to evening meal.   Quantity:  30 capsule   Refills:  1            Where to get your medicines      These medications were sent to [id number] [place] - [address] AT [address] [place] [address]     Phone:  [phone]     omeprazole 20 MG CR capsule                Primary Care Provider Office Phone # Fax #    Lana Titi Peguero [phone] [address] [facility] [address]        Equal Access to Services     DANIELLA FUNG AH: " Hadii valerie izaguirre hadroxanao Sohumbertoali, waaxda luqadaha, qaybta kaalmada danya, bacilio arronbella watkinscherelle avery. So [facility] [phone].    ATENCIÓN: Si rashad montanez, tiene a urlich disposición servicios gratuitos de asistencia lingüística. Llame al [phone].    We comply with applicable federal civil rights laws and [place] laws. We do not discriminate on the basis of race, color, national origin, age, disability, sex, sexual orientation, or gender identity.            Thank you!     Thank you for choosing [facility]  for your care. Our goal is always to provide you with excellent care. Hearing back from our patients is one way we can continue to improve our services. Please take a few minutes to complete the written survey that you may receive in the mail after your visit with us. Thank you!             Your Updated Medication List - Protect others around you: Learn how to safely use, store and throw away your medicines at www.disposemymeds.org.          This list is accurate as of 5/15/18 11:46 AM.  Always use your most recent med list.                   Brand Name Dispense Instructions for use Diagnosis    amLODIPine-benazepril 10-40 MG per capsule    LOTREL    90 capsule    TAKE 1 CAPSULE BY MOUTH DAILY    Essential hypertension with goal blood pressure less than 140/90       REGINE CONTOUR test strip   Generic drug:  blood glucose monitoring     300 strip    USE TO TEST THREE TIMES DAILY OR AS DIRECTED    Type 2 diabetes mellitus with diabetic polyneuropathy (H)       docusate sodium 100 MG capsule    COLACE    30 capsule    Take 1 capsule by mouth 2 times daily as needed for constipation.    Constipation       Fluocinonide 0.1 % Crea     60 g    Externally apply topically 2 times daily as needed    Dermatitis       fluticasone 50 MCG/ACT spray    FLONASE    16 g    SPRAY 1-2 SPRAYS INTO BOTH NOSTRILS DAILY    Chronic rhinitis       glimepiride 4 MG tablet    AMARYL    180 tablet    TAKE 1  TABLET (4 MG) BY MOUTH 2 TIMES DAILY    Type 2 diabetes mellitus with diabetic polyneuropathy, without long-term current use of insulin (H)       HYDROcodone-acetaminophen 5-325 MG per tablet    NORCO    120 tablet    TAKE 1 TO 2 TABLETS BY MOUTH 3 TIMES DAILY AS NEEDED FOR MODERATE TO SVERE PAIN    Diabetic polyneuropathy associated with type 2 diabetes mellitus (H)       metFORMIN 500 MG tablet    GLUCOPHAGE    360 tablet    Take 2 tablets (1,000 mg) by mouth 2 times daily (with meals)    Type 2 diabetes mellitus with diabetic polyneuropathy, without long-term current use of insulin (H)       omeprazole 20 MG CR capsule    priLOSEC    30 capsule    Take 1 capsule (20 mg) by mouth daily Take 30-60 min prior to evening meal.    Gastroesophageal reflux disease without esophagitis       * order for DME     1 Device    Accu-Check Glucometer or other as covered by insurance.    Type 2 diabetes, HbA1C goal < 8% (H)       * order for DME     1 Units    Accu-Check or other as covered by insurance. Test strips and lancets - per device and insurance plan. Tests once daily. Disp: 100 each Refill: 5 each    Type 2 diabetes, HbA1C goal < 8% (H)       * order for DME     1 each    Equipment being ordered: Depends undergarments 80 per month x 1 year    Urinary incontinence, functional       * order for DME     1 each    Equipment being ordered: handheld shower head    Diabetic neuropathy (H)       * order for DME     1 each    Equipment being ordered: lift chair    Diabetic neuropathy (H)       * order for DME     1 each    Wheelchair.    Gait abnormality, Diabetic neuropathy (H)       * order for DME     1 each    Equipment being ordered: automatic BP cuff    Hypertension goal BP (blood pressure) < 140/90       order for DME     1 Units    Equipment being ordered: diabetic shoes    Type 2 diabetes, HbA1C goal < 8% (H), Diabetic neuropathy (H)       order for DME     1 Device    Equipment being ordered: automatic BP cuff     Hypertension goal BP (blood pressure) < 140/90       order for DME     1 Device    Equipment being ordered: glucometer - one touch ultra or per insurance  Tests daily    Type 2 diabetes, HbA1C goal < 8% (H)       order for DME     1 each    Equipment being ordered: Test strips and lancets - per device and insurance plan. Tests daily  Disp: 100 Refill: 5    Type 2 diabetes, HbA1C goal < 8% (H)       order for DME     1 Device    Equipment being ordered: Life Alert    Type 2 diabetes, HbA1C goal < 8% (H)       order for DME     200 Units    Equipment being ordered: Test strips - accu-check smartview Tests twice daily due to uncontrolled sugar    Type 2 diabetes mellitus with diabetic polyneuropathy, without long-term current use of insulin (H)       pioglitazone 45 MG tablet    ACTOS    90 tablet    TAKE 1 TABLET (50 MG) BY MOUTH DAILY    Type 2 diabetes mellitus with diabetic polyneuropathy, without long-term current use of insulin (H)       pregabalin 100 MG capsule    LYRICA    180 capsule    Take 1 capsule (100 mg) by mouth 2 times daily    Diabetic polyneuropathy associated with type 2 diabetes mellitus (H)       ramelteon 8 MG tablet    ROZEREM    30 tablet    Take 1 tablet (8 mg) by mouth At Bedtime    Chronic insomnia       simvastatin 20 MG tablet    ZOCOR    90 tablet    TAKE 1 TABLET (20 MG) BY MOUTH AT BEDTIME    Hyperlipidemia LDL goal <100       sitagliptin 50 MG tablet    JANUVIA    90 tablet    TAKE 1 TABLET BY MOUTH EVERY DAY.    Type 2 diabetes mellitus with diabetic polyneuropathy, without long-term current use of insulin (H)       * STATIN NOT PRESCRIBED (INTENTIONAL)     0 each         * triamcinolone 0.1 % cream    KENALOG    80 g    APPLY EXTERNALLY TO THE AFFECTED AREA TWICE DAILY AS NEEDED FOR IRRITATION    Dermatitis       * triamcinolone 0.1 % cream    KENALOG    80 g    APPLY EXTERNALLY TO THE AFFECTED AREA TWICE DAILY AS NEEDED FOR IRRITATION    Dermatitis       * Notice:  This list has 10  medication(s) that are the same as other medications prescribed for you. Read the directions carefully, and ask your doctor or other care provider to review them with you.

## 2018-05-15 NOTE — TELEPHONE ENCOUNTER
Reason for Call:  Medication or medication refill:    Do you use a [facility]?  Name of the pharmacy and phone number for the current request:  N/A   Name of the medication requested: HYDROCODONE (norco) 5-325 MG TB  Other request: Call patient when ready for pickup    Can we leave a detailed message on this number? YES    Phone number patient can be reached at: Cell number on file:    Telephone Information:   Mobile [phone]      Best Time: ANYTIME    Call taken on 5/15/2018 at 12:02 PM by Cely Song

## 2018-05-15 NOTE — PROGRESS NOTES
"  SUBJECTIVE:   Enrique Pruitt is a 73 year old female who presents to clinic today for the following health issues:      Concern - Abdomin Pains  Onset: 2 weeks and never present in the past.     Description:   Sharp abdomin pains, swelling of the area, only occurs at night    Intensity: mild to severe    Progression of Symptoms:  Worsening last couple of days    Accompanying Signs & Symptoms:  Affecting sleep    Previous history of similar problem:   None    Precipitating factors:   Worsened by: None    Alleviating factors:  Improved by: Ibuprofen w/ some relief    Therapies Tried and outcome: None    Patient is accompanied with family member. Her family member noted that she has been hurting for the past 2 to 3 days. Stomach pain was noted to occur at night. Denies fever at home. Denies taking new medications. She takes tylenol at times, but was noted to take hydrocodone for numbness in the feet. Numbness in the feet is noted to be constant.  Bowel Movements are noted to be \"normal\".  Denies hematochezia. Denies nausea. She also noted to experience GERD symptoms upper in the chest area. GERD symptoms was noted to be improved after eructation, however at times she has difficulty with eructation to relieve pain. Pain is noted to be worse after eating and sleeping. She has bread and coffee in the morning with toleration, but noted the pain becomes present when laying down after eating. Also, at times patient noted that she intermittently has difficulty swallowing located upper in the throat area.  Denies previous surgeries, however she noted to have delivered via .      OF NOTE patient has never took GERD medication in the past, and eats dinner around 6pm at night.       Problem list and histories reviewed & adjusted, as indicated.  Additional history: as documented    Patient Active Problem List   Diagnosis     HTN (hypertension), benign     Hypertension goal BP (blood pressure) < 140/90     " CARDIOVASCULAR SCREENING; LDL GOAL LESS THAN 100     Diabetic neuropathy (H)     Advanced directives, counseling/discussion     Pseudophakia of both eyes     Health Care Home     Hyperlipidemia LDL goal <100     Insomnia     Allergic rhinitis     Diabetic polyneuropathy associated with type 2 diabetes mellitus (H)     Epiretinal membrane, bilateral     Macular hole of right eye     Type 2 diabetes mellitus with diabetic polyneuropathy, without long-term current use of insulin (H)     Chronic pain syndrome     Closed fracture of distal end of left fibula with routine healing, unspecified fracture morphology, subsequent encounter     Past Surgical History:   Procedure Laterality Date     CATARACT IOL, RT/LT  1996?       Social History   Substance Use Topics     Smoking status: Never Smoker     Smokeless tobacco: Never Used     Alcohol use No     Family History   Problem Relation Age of Onset     Unknown/Adopted No family hx of      uncertain of fam hx     CANCER No family hx of      DIABETES No family hx of      Hypertension No family hx of      CEREBROVASCULAR DISEASE No family hx of      Thyroid Disease No family hx of      Glaucoma No family hx of      Macular Degeneration No family hx of          Current Outpatient Prescriptions   Medication Sig Dispense Refill     amLODIPine-benazepril (LOTREL) 10-40 MG per capsule TAKE 1 CAPSULE BY MOUTH DAILY 90 capsule 1     REGINE CONTOUR test strip USE TO TEST THREE TIMES DAILY OR AS DIRECTED 300 strip 3     docusate sodium (COLACE) 100 MG capsule Take 1 capsule by mouth 2 times daily as needed for constipation. 30 capsule 2     Fluocinonide 0.1 % CREA Externally apply topically 2 times daily as needed 60 g 2     fluticasone (FLONASE) 50 MCG/ACT nasal spray SPRAY 1-2 SPRAYS INTO BOTH NOSTRILS DAILY 16 g 3     glimepiride (AMARYL) 4 MG tablet TAKE 1 TABLET (4 MG) BY MOUTH 2 TIMES DAILY 180 tablet 1     HYDROcodone-acetaminophen (NORCO) 5-325 MG per tablet TAKE 1 TO 2 TABLETS  BY MOUTH 3 TIMES DAILY AS NEEDED FOR MODERATE TO SVERE PAIN 120 tablet 0     metFORMIN (GLUCOPHAGE) 500 MG tablet Take 2 tablets (1,000 mg) by mouth 2 times daily (with meals) 360 tablet 1     ORDER FOR DME Accu-Check Glucometer or other as covered by insurance.   1 Device 0     ORDER FOR DME Accu-Check or other as covered by insurance. Test strips and lancets - per device and insurance plan.  Tests once daily.  Disp: 100 each  Refill: 5 each 1 Units 0     ORDER FOR DME Equipment being ordered: Depends undergarments  80 per month x 1 year 1 each 0     ORDER FOR DME Equipment being ordered: handheld shower head 1 each 0     ORDER FOR DME Equipment being ordered: lift chair 1 each 0     ORDER FOR DME Wheelchair. 1 each 0     ORDER FOR DME Equipment being ordered: automatic BP cuff 1 each 0     ORDER FOR DME Equipment being ordered: diabetic shoes 1 Units 2     ORDER FOR DME Equipment being ordered: automatic BP cuff 1 Device 0     ORDER FOR DME Equipment being ordered: glucometer - one touch ultra or per insurance   Tests daily 1 Device 0     ORDER FOR DME Equipment being ordered: Test strips and lancets - per device and insurance plan.  Tests daily   Disp: 100  Refill: 5 1 each 0     order for DME Equipment being ordered: Life Alert 1 Device 0     order for DME Equipment being ordered: Test strips - accu-check smartview  Tests twice daily due to uncontrolled sugar 200 Units 5     pioglitazone (ACTOS) 45 MG tablet TAKE 1 TABLET (50 MG) BY MOUTH DAILY 90 tablet 1     pregabalin (LYRICA) 100 MG capsule Take 1 capsule (100 mg) by mouth 2 times daily 180 capsule 1     ramelteon (ROZEREM) 8 MG tablet Take 1 tablet (8 mg) by mouth At Bedtime 30 tablet 2     simvastatin (ZOCOR) 20 MG tablet TAKE 1 TABLET (20 MG) BY MOUTH AT BEDTIME 90 tablet 1     sitagliptin (JANUVIA) 50 MG tablet TAKE 1 TABLET BY MOUTH EVERY DAY. 90 tablet 1     STATIN NOT PRESCRIBED, INTENTIONAL,  0 each 0     triamcinolone (KENALOG) 0.1 % cream APPLY  EXTERNALLY TO THE AFFECTED AREA TWICE DAILY AS NEEDED FOR IRRITATION 80 g 2     triamcinolone (KENALOG) 0.1 % cream APPLY EXTERNALLY TO THE AFFECTED AREA TWICE DAILY AS NEEDED FOR IRRITATION 80 g 0     No Known Allergies  Recent Labs   Lab Test  04/27/18   1144  12/19/17   1709  05/03/17   1142  06/29/16   1402   09/06/13   1447   09/06/12   1037  07/06/12   1035   A1C  6.8*  8.4*  6.9*  7.6*   < >  8.2*   < >  11.9*  13.2*   LDL   --   155*  115*  67   < >  52   --   58  145*   HDL   --    --   55   --    --   50   --   63  53   TRIG   --    --   117   --    --    --    --   119  189*   ALT   --   15  14  17   < >  20   < >   --   7   CR   --   1.24*  0.95  0.87   < >  0.80   < >   --   0.60   GFRESTIMATED   --   42*  57*  64   < >  71   < >   --   >90   GFRESTBLACK   --   51*  69  78   < >  86   < >   --   >90   POTASSIUM   --   4.6  4.8  4.8   < >  4.9   < >   --   4.6   TSH   --    --   1.12  0.55   < >   --    --    --   1.04    < > = values in this interval not displayed.      BP Readings from Last 3 Encounters:   05/15/18 110/66   12/19/17 102/60   05/17/17 108/60    Wt Readings from Last 3 Encounters:   05/15/18 72.1 kg (159 lb)   12/19/17 64.2 kg (141 lb 9.6 oz)   05/17/17 70.3 kg (154 lb 14.4 oz)                  Labs reviewed in EPIC    Reviewed and updated as needed this visit by clinical staff  Tobacco  Allergies  Meds  Med Hx  Surg Hx  Fam Hx  Soc Hx      Reviewed and updated as needed this visit by Provider  Tobacco  Allergies  Meds  Med Hx  Surg Hx  Fam Hx  Soc Hx        ROS:  Constitutional, HEENT, cardiovascular, pulmonary, GI, , musculoskeletal, neuro, skin, endocrine and psych systems are negative, except as otherwise noted.    This document serves as a record of the services and decisions personally performed and made by Judy Lovelace MD. It was created on her behalf by Farzad Gupta, a trained medical scribe. The creation of this document is based on the provider's statements to  "the medical scribe.  Farzad Gupta May 15, 2018 11:31 AM      OBJECTIVE:     /66 (BP Location: Right arm, Patient Position: Chair, Cuff Size: Adult Regular)  Pulse 96  Temp 97.6  F (36.4  C) (Oral)  Ht 1.448 m (4' 9\")  Wt 72.1 kg (159 lb)  SpO2 99%  Breastfeeding? No  BMI 34.41 kg/m2  Body mass index is 34.41 kg/(m^2).  GENERAL: alert, no distress and obese  EYES: Eyes grossly normal to inspection, PERRL and conjunctivae and sclerae normal  HENT: ear canals and TM's normal, nose and mouth without ulcers or lesions  NECK: no adenopathy, no asymmetry, masses, or scars and thyroid normal to palpation  RESP: lungs clear to auscultation - no rales, rhonchi or wheezes  CV: regular rate and rhythm, normal S1 S2, no S3 or S4, no murmur, click or rub, no peripheral edema and peripheral pulses strong  ABDOMEN: tenderness epigastric area and mildly diffusely without rebound or guarding noted , no organomegaly or masses, bowel sounds normal.   MS: no gross musculoskeletal defects noted, no edema  SKIN: no suspicious lesions or rashes  NEURO: Normal strength and tone, mentation intact and speech normal  PSYCH: mentation appears normal, affect normal/bright    Diagnostic Test Results:    Results for orders placed or performed in visit on 05/15/18   Comprehensive metabolic panel   Result Value Ref Range    Sodium 141 133 - 144 mmol/L    Potassium 5.2 3.4 - 5.3 mmol/L    Chloride 109 94 - 109 mmol/L    Carbon Dioxide 28 20 - 32 mmol/L    Anion Gap 4 3 - 14 mmol/L    Glucose 98 70 - 99 mg/dL    Urea Nitrogen 36 (H) 7 - 30 mg/dL    Creatinine 1.10 (H) 0.52 - 1.04 mg/dL    GFR Estimate 49 (L) >60 mL/min/1.7m2    GFR Estimate If Black 59 (L) >60 mL/min/1.7m2    Calcium 9.2 8.5 - 10.1 mg/dL    Bilirubin Total 0.3 0.2 - 1.3 mg/dL    Albumin 3.8 3.4 - 5.0 g/dL    Protein Total 7.7 6.8 - 8.8 g/dL    Alkaline Phosphatase 98 40 - 150 U/L    ALT 16 0 - 50 U/L    AST 16 0 - 45 U/L   CBC with platelets   Result Value Ref Range    " "WBC 6.6 4.0 - 11.0 10e9/L    RBC Count 3.71 (L) 3.8 - 5.2 10e12/L    Hemoglobin 9.9 (L) 11.7 - 15.7 g/dL    Hematocrit 29.6 (L) 35.0 - 47.0 %    MCV 80 78 - 100 fl    MCH 26.7 26.5 - 33.0 pg    MCHC 33.4 31.5 - 36.5 g/dL    RDW 15.5 (H) 10.0 - 15.0 %    Platelet Count 308 150 - 450 10e9/L   Lipase   Result Value Ref Range    Lipase 179 73 - 393 U/L   *UA reflex to Microscopic   Result Value Ref Range    Color Urine Yellow     Appearance Urine Clear     Glucose Urine Negative NEG^Negative mg/dL    Bilirubin Urine Negative NEG^Negative    Ketones Urine Negative NEG^Negative mg/dL    Specific Gravity Urine 1.015 1.003 - 1.035    Blood Urine Small (A) NEG^Negative    pH Urine 7.0 5.0 - 7.0 pH    Protein Albumin Urine Negative NEG^Negative mg/dL    Urobilinogen Urine 0.2 0.2 - 1.0 EU/dL    Nitrite Urine Negative NEG^Negative    Leukocyte Esterase Urine Negative NEG^Negative    Source Midstream Urine    Urine Microscopic   Result Value Ref Range    WBC Urine 0 - 5 OTO5^0 - 5 /HPF    RBC Urine 2-5 (A) OTO2^O - 2 /HPF       ASSESSMENT/PLAN:     Tobacco Cessation:   reports that she has never smoked. She has never used smokeless tobacco.      BMI:   Estimated body mass index is 34.41 kg/(m^2) as calculated from the following:    Height as of this encounter: 1.448 m (4' 9\").    Weight as of this encounter: 72.1 kg (159 lb).   Weight management plan: Discussed healthy diet and exercise guidelines and patient will follow up in 6 months in clinic to re-evaluate.      1. Generalized abdominal pain  No signs acute abdomen noted on exam.  Kidney function unchanged.  Symptoms likely related at least in part to 2.  - Comprehensive metabolic panel  - CBC with platelets  - Lipase  - *UA reflex to Microscopic  - Urine Microscopic    2. Gastroesophageal reflux disease without esophagitis  Begin medication for stomach protection/acid reduction.    - omeprazole (PRILOSEC) 20 MG CR capsule; Take 1 capsule (20 mg) by mouth daily Take 30-60 " min prior to evening meal.  Dispense: 30 capsule; Refill: 1    3. Type 2 diabetes mellitus with diabetic polyneuropathy, without long-term current use of insulin (H)  Last A1C 6.8, continue current treatment.    4. Anemia, unspecified type  No Hgb on file here.  On care everywhere Hgb in 2015 - 11.8, in 2014 - 10.9  Assess iron stores.  ?related to kidney function or other acute or chronic blood loss.  Follow up appointment recommended.    - Ferritin      Patient instructions discussed with patient    Patient Instructions   Labs and urine test today. I will notify you of results when I receive them.     Begin Prilosec 30 to 60 minutes prior to your evening meal. Take as directed.      Follow up if fever, worsening abdominal pain, vomiting, or diarrhea symptoms occur.       The information in this document, created by the medical scribe for me, accurately reflects the services I personally performed and the decisions made by me. I have reviewed and approved this document for accuracy prior to leaving the patient care area.  May 15, 2018 12:15 PM      Judy Lovelace MD  [facility]

## 2018-05-15 NOTE — LETTER
[address]  [address]  [phone]    May 18, 2018      Enrique Pruitt  [address] [place] [id number]        Dear Enrique    Attached you will find a copy of your recent laboratory report.   Your urine testing does not show any infection.  There is a small amount of blood present. If you are having ongoing symptoms, additional evaluation is needed.     Your blood sugar is normal.   Kidney function is slightly improved from December but remains decreased.   Liver testing and pancreas testing are both normal.   Your blood cell counts do not show sign of infection but do show anemia.  I have requested some additional testing for this and will forward that to you when available.     Hopefully you have gotten relief of your symptoms with the medication given at your appointment.  I would recommend a follow up appointment to review these labs and your symptoms in the next 1-2 weeks.   Please call or MyChart message me if you have any questions.     Sincerely,         Judy Lovelace MD

## 2018-05-15 NOTE — PATIENT INSTRUCTIONS
Labs and urine test today. I will notify you of results when I receive them.     Begin Prilosec 30 to 60 minutes prior to your evening meal. Take as directed.      Follow up if fever, worsening abdominal pain, vomiting, or diarrhea symptoms occur.

## 2018-05-15 NOTE — LETTER
[address]  [address]  [phone]    May 21, 2018      Enrique Pruitt  [address] [place] [id number]      Dear Enrique,    Attached you will find a copy of your recent laboratory report.   Your additional testing indicates your iron level is normal.  The low hemoglobin is likely related to your kidney function.  Follow up lab testing of this with an appointment in 2-3 months is recommended.   Please call or MyChart message me if you have any questions.     Sincerely,         Judy Lovelace MD

## 2018-05-16 ASSESSMENT — PATIENT HEALTH QUESTIONNAIRE - PHQ9: SUM OF ALL RESPONSES TO PHQ QUESTIONS 1-9: 15

## 2018-05-16 ASSESSMENT — ANXIETY QUESTIONNAIRES: GAD7 TOTAL SCORE: 13

## 2018-05-16 NOTE — TELEPHONE ENCOUNTER
This writer attempted to contact pt on 05/16/18      Reason for call relay message and left message.      If patient calls back:   Relay message refill for hydrocodone denied, was just filled 2 weeks ago., (read verbatim), document that pt called and close encounter        Luba Paez MA

## 2018-05-17 NOTE — TELEPHONE ENCOUNTER
This writer attempted to contact pt on 05/17/18        Reason for call relay message and left message.        If patient calls back:                        Relay message refill for hydrocodone denied, was just filled 2 weeks ago., (read verbatim), document that pt called and close encounter           Luba Paez MA

## 2018-05-18 LAB — FERRITIN SERPL-MCNC: 46 NG/ML (ref 8–252)

## 2018-05-18 NOTE — TELEPHONE ENCOUNTER
LM for pt to call clinic.  3rd attempt, with no response.  Will close encounter and revisit if pt calls back.      Luba CAR, Patient Care

## 2018-05-18 NOTE — PROGRESS NOTES
Please print letter and attach results.  PSK        Attached you will find a copy of your recent laboratory report.  Your urine testing does not show any infection.  There is a small amount of blood present. If you are having ongoing symptoms, additional evaluation is needed.    Your blood sugar is normal.  Kidney function is slightly improved from December but remains decreased.  Liver testing and pancreas testing are both normal.  Your blood cell counts do not show sign of infection but do show anemia.  I have requested some additional testing for this and will forward that to you when available.    Hopefully you have gotten relief of your symptoms with the medication given at your appointment.  I would recommend a follow up appointment to review these labs and your symptoms in the next 1-2 weeks.  Please call or MyChart message me if you have any questions.    Sincerely,         Judy Lovelace MD

## 2018-05-21 NOTE — PROGRESS NOTES
Please print letter and attach results.  PSK      Attached you will find a copy of your recent laboratory report.  Your additional testing indicates your iron level is normal.  The low hemoglobin is likely related to your kidney function.  Follow up lab testing of this with an appointment in 2-3 months is recommended.  Please call or MyChart message me if you have any questions.    Sincerely,         Judy Lovelace MD

## 2018-05-25 DIAGNOSIS — L30.9 DERMATITIS: ICD-10-CM

## 2018-05-25 RX ORDER — TRIAMCINOLONE ACETONIDE 1 MG/G
CREAM TOPICAL
Qty: 80 G | Refills: 0 | Status: SHIPPED | OUTPATIENT
Start: 2018-05-25 | End: 2019-05-17

## 2018-05-25 NOTE — TELEPHONE ENCOUNTER
"Requested Prescriptions   Pending Prescriptions Disp Refills     triamcinolone (KENALOG) 0.1 % cream [Pharmacy Med Name: TRIAMCINOLONE 0.1% CREAM 80GM]  Last Written Prescription Date:  4/24/18  Last Fill Quantity: 80 g,  # refills: 0   Last office visit: 12/19/17 with prescribing provider:  Dr. Peguero  Future Office Visit:   80 g 0     Sig: APPLY EXTERNALLY TO THE AFFECTED AREA TWICE DAILY AS NEEDED FOR IRRITATION    Topical Steroid Protocol Passed    5/25/2018  3:38 AM       Passed - Patient is age 6 or older       Passed - Authorizing prescriber's most recent note related to this medication read.       Passed - High potency steroid not ordered       Passed - Recent (12 mo) or future (30 days) visit within the authorizing provider's specialty    Patient had office visit in the last 12 months or has a visit in the next 30 days with authorizing provider or within the authorizing provider's specialty.  See \"Patient Info\" tab in inbasket, or \"Choose Columns\" in Meds & Orders section of the refill encounter.              "

## 2018-06-04 ENCOUNTER — PATIENT OUTREACH (OUTPATIENT)
Dept: GERIATRIC MEDICINE | Facility: CLINIC | Age: 73
End: 2018-06-04

## 2018-06-04 NOTE — PROGRESS NOTES
Call placed to member to schedule a home visit appointment to complete the annual HRA.  A home visit has been scheduled for Tuesday 6/19/18 at 10:00am.   has been set up.    Crystal Parson RN  [facility]  [phone]

## 2018-06-19 ENCOUNTER — PATIENT OUTREACH (OUTPATIENT)
Dept: GERIATRIC MEDICINE | Facility: CLINIC | Age: 73
End: 2018-06-19

## 2018-06-19 DIAGNOSIS — Z76.89 HEALTH CARE HOME: ICD-10-CM

## 2018-06-19 ASSESSMENT — ACTIVITIES OF DAILY LIVING (ADL)
DEPENDENT_IADLS:: CLEANING;COOKING;LAUNDRY;SHOPPING;MEAL PREPARATION;MEDICATION MANAGEMENT;MONEY MANAGEMENT;TRANSPORTATION

## 2018-06-19 NOTE — PROGRESS NOTES
[facility] Care Coordination Contact    [facility] Home Visit Assessment     Home visit for Health Risk Assessment with Enrique Pruitt completed on June 19, 2018    Type of residence:: Private home - stairs  Current living arrangement:: I live in a private home with family          Current Care Plan     Member currently receiving the following community resources: PCA    Medication Review  Medication reconciliation completed in Epic: Yes  Medication set-up & administration: Family/informal caregiver sets up daily.  Family caregiver administers medications.  Medication understanding concerns (by member, family or CC): No  Medication adherence concerns (by member, family or CC): No    Mental/Behavioral Health   Depression Screening: See PHQ assessment flowsheet.   Mental health DX:: No        Falls Assessment:   Fallen 2 or more times in the past year?: No   Any fall with injury in the past year?: No    ADL/IADL Dependencies:   Dependent ADLs:: Ambulation-walker, Bathing, Dressing, Eating, Grooming, Transfers, Toileting  Dependent IADLs:: Cleaning, Cooking, Laundry, Shopping, Meal Preparation, Medication Management, Money Management, Transportation    [facility] sponsored benefits: Shared information re: Silver Sneakers/gym memberships, ASA, Calcium +D.    PCA Assessment completed at visit: Yes     Elderly Waiver Eligibility: Yes-will continue on EW    Care Plan & Recommendations: Member will continue to receive PCA, Incontinence Supplies, and Wipes. She has requested a 4ww with a seat and basket as well as a 4 prong cane- CM to order.    See [facility] for detailed assessment information.    Follow-Up Plan: Member informed of future contact, plan to f/u with member with a 6 month telephone assessment.  Contact information shared with member and family, encouraged member to call with any questions or concerns at any time.    [place] care continuum providers: Please refer to Health Care Home on the  Epic Problem List to view this patient's [facility] Care Plan Summary.    Crystal Parson RN  [facility]  [phone]

## 2018-06-21 ENCOUNTER — TELEPHONE (OUTPATIENT)
Dept: FAMILY MEDICINE | Facility: CLINIC | Age: 73
End: 2018-06-21

## 2018-06-21 ENCOUNTER — PATIENT OUTREACH (OUTPATIENT)
Dept: GERIATRIC MEDICINE | Facility: CLINIC | Age: 73
End: 2018-06-21

## 2018-06-21 NOTE — LETTER
June 21, 2018    VAN NEWBERRY  [address] [address]    Dear Van,     At [facility], we re dedicated to improving the health and well-being of our members.  Enclosed you will find the Comprehensive Care Plan that was developed with you on 06/19/2018. Please review the Care Plan carefully.    As a reminder, some of the things we discussed at your visit include:    Ways to improve or maintain your physical health such as walking 20 minutes a day.     Ways to reduce the risk of falls.     Health care needs you may have.     Don t forget to contact your care coordinator if you:    Have been hospitalized or plan to be hospitalized.     Have experienced a fall.      Have experienced a change in physical health, which may include bladder control and pain issues.      Are experiencing emotional problems.     If you do not agree with your Care Plan, have questions about it, or have experienced a change in your needs, please contact me, your care coordinator, at [phone]. If you are hearing impaired, please call the [place] Relay at 229 or [phone] ([id number] relay service).    Sincerely,      Crystal Parson RN  [facility]     [facility] is a health plan that contracts with both Medicare and the [place] Medical Assistance (Medicaid) program to provide benefits of both programs to enrollees. Enrollment in [facility] depends on contract renewal.    MSC+ [id number] IA ([id number])                                                  (12/16)

## 2018-06-21 NOTE — PROGRESS NOTES
[facility] Care Coordination Contact  Received after visit chart from care coordinator.  Completed following tasks: Mailed copy of care plan to client, Updated services in access and Entered MMIS  Chart was returned to CC.       [facility]:  Emailed completed PCA assessment to [facility].  Faxed copy of PCA assessment to PCA Agency and mailed copy to member.  Faxed MD Communication to PCP.     Provider Signature - No POC Shared:  Member indicates that they do not want their POC shared with any EW providers.    Mary Rashid  Case Management Specialist  [facility]   [phone]

## 2018-07-06 DIAGNOSIS — E11.42 DIABETIC POLYNEUROPATHY ASSOCIATED WITH TYPE 2 DIABETES MELLITUS (H): ICD-10-CM

## 2018-07-06 DIAGNOSIS — E11.42 TYPE 2 DIABETES MELLITUS WITH DIABETIC POLYNEUROPATHY (H): ICD-10-CM

## 2018-07-06 DIAGNOSIS — G89.4 CHRONIC PAIN SYNDROME: ICD-10-CM

## 2018-07-06 RX ORDER — HYDROCODONE BITARTRATE AND ACETAMINOPHEN 5; 325 MG/1; MG/1
TABLET ORAL
Qty: 60 TABLET | Refills: 0 | Status: SHIPPED | OUTPATIENT
Start: 2018-07-06 | End: 2018-08-13

## 2018-07-06 NOTE — TELEPHONE ENCOUNTER
Requested Prescriptions   Pending Prescriptions Disp Refills     HYDROcodone-acetaminophen (NORCO) 5-325 MG per tablet 120 tablet 0     Sig: TAKE 1 TO 2 TABLETS BY MOUTH 3 TIMES DAILY AS NEEDED FOR MODERATE TO SVERE PAIN    There is no refill protocol information for this order        Routing refill request to provider for review/approval because:  Drug not on the [facility] refill protocol     Miki Nova RN, BSN

## 2018-07-06 NOTE — TELEPHONE ENCOUNTER
"Requested Prescriptions   Pending Prescriptions Disp Refills     REGINE CONTOUR test strip [Pharmacy Med Name: CONTOUR TEST STRIPS 100'S]  Last Written Prescription Date:  1/4/18  Last Fill Quantity: 300 strip,  # refills: 3   Last office visit: 5/15/2018 with prescribing provider: Dr. Peguero    Future Office Visit:   300 strip 0     Sig: USE TO TEST THREE TIMES DAILY OR AS DIRECTED    Diabetic Supplies Protocol Passed    7/6/2018  4:48 PM       Passed - Patient is 18 years of age or older       Passed - Recent (6 mo) or future (30 days) visit within the authorizing provider's specialty    Patient had office visit in the last 6 months or has a visit in the next 30 days with authorizing provider.  See \"Patient Info\" tab in inbasket, or \"Choose Columns\" in Meds & Orders section of the refill encounter.              "

## 2018-07-06 NOTE — TELEPHONE ENCOUNTER
Reason for Call:  Medication or medication refill:    Do you use a [facility]?  Name of the pharmacy and phone number for the current request:  Written RX    Name of the medication requested: Pending Prescriptions:                       Disp   Refills    HYDROcodone-acetaminophen (NORCO) 5-325 M*120 ta*0            Sig: TAKE 1 TO 2 TABLETS BY MOUTH 3 TIMES DAILY AS           NEEDED FOR MODERATE TO SVERE PAIN    Other request: Dmitri VILLAGRAN,OUDOMPHONE will pickup prescription    Can we leave a detailed message on this number? YES    Phone number patient can be reached at: [phone]    Best Time: any    Call taken on 7/6/2018 at 10:22 AM by Debi Nava

## 2018-07-09 ENCOUNTER — PATIENT OUTREACH (OUTPATIENT)
Dept: GERIATRIC MEDICINE | Facility: CLINIC | Age: 73
End: 2018-07-09

## 2018-07-09 DIAGNOSIS — Z76.89 HEALTH CARE HOME: ICD-10-CM

## 2018-07-09 NOTE — PROGRESS NOTES
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

## 2018-07-09 NOTE — TELEPHONE ENCOUNTER
Prescription approved per [facility] Refill Protocol.  Lyssa Singh RN

## 2018-07-12 ENCOUNTER — TELEPHONE (OUTPATIENT)
Dept: FAMILY MEDICINE | Facility: CLINIC | Age: 73
End: 2018-07-12

## 2018-07-12 NOTE — TELEPHONE ENCOUNTER
Prior Authorization Approval    Authorization Effective Date: 6/12/2018  Authorization Expiration Date: 7/12/2019  Medication: PA for Contour Test Strips - Approved  Approved Dose/Quantity:    Reference #: [id number]   Insurance Company: [employer] - Phone [phone] Fax [phone]  Expected CoPay:       CoPay Card Available:      Foundation Assistance Needed:    Which Pharmacy is filling the prescription (Not needed for infusion/clinic administered[facility] [address] [place] [address] AT [facility]  Pharmacy Notified: Yes  Patient Notified: Yes

## 2018-07-12 NOTE — TELEPHONE ENCOUNTER
PA for Contour Test Strips    Phone# [phone]    ID# [id number]    PA or alternative    Sheri Gonzalez

## 2018-07-12 NOTE — TELEPHONE ENCOUNTER
Central Prior Authorization Team   Phone: [phone]      PA Initiation    Medication: PA for Contour Test Strips  Insurance Company: [employer] - Phone [phone] Fax [phone]  Pharmacy Filling the Rx: [facility] [address] [place] [address] [address] AT [facility]  Filling Pharmacy Phone: [phone]  Filling Pharmacy Fax: [phone]  Start Date: 7/12/2018

## 2018-07-26 NOTE — PROGRESS NOTES
Per [facility], 4 wheeled walker and 4 prong cane were delivered on 07/23/2018.    Mary Rashid  Case Management Specialist  [facility]   [phone]

## 2018-07-28 PROCEDURE — 82274 ASSAY TEST FOR BLOOD FECAL: CPT | Performed by: FAMILY MEDICINE

## 2018-07-30 DIAGNOSIS — E78.5 HYPERLIPIDEMIA LDL GOAL <100: ICD-10-CM

## 2018-07-30 DIAGNOSIS — E11.42 DIABETIC POLYNEUROPATHY ASSOCIATED WITH TYPE 2 DIABETES MELLITUS (H): ICD-10-CM

## 2018-07-30 DIAGNOSIS — E11.42 TYPE 2 DIABETES MELLITUS WITH DIABETIC POLYNEUROPATHY, WITHOUT LONG-TERM CURRENT USE OF INSULIN (H): ICD-10-CM

## 2018-07-30 DIAGNOSIS — I10 ESSENTIAL HYPERTENSION WITH GOAL BLOOD PRESSURE LESS THAN 140/90: ICD-10-CM

## 2018-07-30 DIAGNOSIS — K21.9 GASTROESOPHAGEAL REFLUX DISEASE WITHOUT ESOPHAGITIS: ICD-10-CM

## 2018-07-30 NOTE — TELEPHONE ENCOUNTER
"Requested Prescriptions   Pending Prescriptions Disp Refills     omeprazole (PRILOSEC) 20 MG CR capsule [Pharmacy Med Name: OMEPRAZOLE 20MG CAPSULES]  Last Written Prescription Date:  5/15/18  Last Fill Quantity: 30 capsule,  # refills: 1   Last office visit: 5/15/2018 with prescribing provider:  Dr. Lovelace   Future Office Visit:   Next 5 appointments (look out 90 days)     Sep 10, 2018  5:00 PM CDT   Office Visit with Lana Peguero MD   [facility] ([address] [address] [phone]                30 capsule 0     Sig: TAKE 1 CAPSULE(20 MG) BY MOUTH DAILY 30 TO 60 MINUTES BEFORE THE EVENING MEAL    PPI Protocol Passed    7/30/2018  1:52 PM       Passed - Not on Clopidogrel (unless Pantoprazole ordered)       Passed - No diagnosis of osteoporosis on record       Passed - Recent (12 mo) or future (30 days) visit within the authorizing provider's specialty    Patient had office visit in the last 12 months or has a visit in the next 30 days with authorizing provider or within the authorizing provider's specialty.  See \"Patient Info\" tab in inbasket, or \"Choose Columns\" in Meds & Orders section of the refill encounter.           Passed - Patient is age 18 or older       Passed - No active pregnacy on record       Passed - No positive pregnancy test in past 12 months          "

## 2018-07-31 DIAGNOSIS — Z12.11 COLON CANCER SCREENING: ICD-10-CM

## 2018-07-31 LAB — HEMOCCULT STL QL IA: NEGATIVE

## 2018-07-31 NOTE — LETTER
[address]  [address]  [phone]    July 31, 2018      Enrqiue Pruitt  [address] [place] [address]      Dear Enrique,        Your stool test was normal - no evidence of blood.  This is a screening test for colon cancer - and though this is not as thorough as a full colonoscopy it has been found to be accurate in detecting concerning colon lesions.  We should repeat this annually.       TIMMY Peguero M.D.

## 2018-07-31 NOTE — PROGRESS NOTES
Please mail results and note to patient:    Your stool test was normal - no evidence of blood.  This is a screening test for colon cancer - and though this is not as thorough as a full colonoscopy it has been found to be accurate in detecting concerning colon lesions.  We should repeat this annually.   TIMMY Peguero M.D.

## 2018-08-02 NOTE — TELEPHONE ENCOUNTER
Routing refill request to provider for review/approval because:  Labs out of range:  Creatinine, microalbumin,   Lyrica not on protocol. Statin does not have a plan in the last visit.    Mireille Cuba RN, [facility]

## 2018-08-02 NOTE — TELEPHONE ENCOUNTER
"Requested Prescriptions   Pending Prescriptions Disp Refills     JANUVIA 50 MG tablet [Pharmacy Med Name: JANUVIA 50MG TABLETS] 90 tablet 0     Sig: TAKE 1 TABLET BY MOUTH EVERY DAY    DPP4 Inhibitors Protocol Failed    8/2/2018 11:20 AM       Failed - Microalbumin on file in past 12 months    Recent Labs   Lab Test  05/03/17   1143   MICROL  21   UMALCR  28.65*            Failed - Normal serum creatinine in past 12 months    Recent Labs   Lab Test  05/15/18   1147   CR  1.10*            Passed - Blood pressure less than 140/90 in past 6 months    BP Readings from Last 3 Encounters:   05/15/18 110/66   12/19/17 102/60   05/17/17 108/60                Passed - LDL on file in past 12 months    Recent Labs   Lab Test  12/19/17   1709   LDL  155*            Passed - HgbA1C in past 3 or 6 months    If HgbA1C is 8 or greater, it needs to be on file within the past 3 months.  If less than 8, must be on file within the past 6 months.     Recent Labs   Lab Test  04/27/18   1144   A1C  6.8*            Passed - Patient is age 18 or older       Passed - No active pregnancy on record       Passed - No positive pregnancy test in past 12 months       Passed - Recent (6 mo) or future (30 days) visit within the authorizing provider's specialty    Patient had office visit in the last 6 months or has a visit in the next 30 days with authorizing provider.  See \"Patient Info\" tab in inbasket, or \"Choose Columns\" in Meds & Orders section of the refill encounter.            glimepiride (AMARYL) 4 MG tablet [Pharmacy Med Name: GLIMEPIRIDE 4MG TABLETS] 180 tablet 0     Sig: TAKE 1 TABLET(4 MG) BY MOUTH TWICE DAILY    Sulfonylurea Agents Failed    8/2/2018 11:20 AM       Failed - Patient has had a Microalbumin in the past 12 mos.    Recent Labs   Lab Test  05/03/17   1143   MICROL  21   UMALCR  28.65*            Failed - Patient has a recent creatinine (normal) within the past 12 mos.    Recent Labs   Lab Test  05/15/18   1147   CR  1.10* " "           Passed - Blood pressure less than 140/90 in past 6 months    BP Readings from Last 3 Encounters:   05/15/18 110/66   12/19/17 102/60   05/17/17 108/60                Passed - Patient has documented LDL within the past 12 mos.    Recent Labs   Lab Test  12/19/17   1709   LDL  155*            Passed - Patient has documented A1c within the specified period of time.    If HgbA1C is 8 or greater, it needs to be on file within the past 3 months.  If less than 8, must be on file within the past 6 months.     Recent Labs   Lab Test  04/27/18   1144   A1C  6.8*            Passed - Patient is age 18 or older       Passed - No active pregnancy on record       Passed - Patient has not had a positive pregnancy test within the past 12 mos.       Passed - Recent (6 mo) or future (30 days) visit within the authorizing provider's specialty    Patient had office visit in the last 6 months or has a visit in the next 30 days with authorizing provider or within the authorizing provider's specialty.  See \"Patient Info\" tab in inTysdoet, or \"Choose Columns\" in Meds & Orders section of the refill encounter.            simvastatin (ZOCOR) 20 MG tablet [Pharmacy Med Name: SIMVASTATIN 20MG TABLETS] 90 tablet 0     Sig: TAKE 1 TABLET(20 MG) BY MOUTH AT BEDTIME    Statins Protocol Passed    8/2/2018 11:21 AM       Passed - LDL on file in past 12 months    Recent Labs   Lab Test  12/19/17   1709   LDL  155*            Passed - No abnormal creatine kinase in past 12 months    No lab results found.            Passed - Recent (12 mo) or future (30 days) visit within the authorizing provider's specialty    Patient had office visit in the last 12 months or has a visit in the next 30 days with authorizing provider or within the authorizing provider's specialty.  See \"Patient Info\" tab in inbasket, or \"Choose Columns\" in Meds & Orders section of the refill encounter.           Passed - Patient is age 18 or older       Passed - No active " "pregnancy on record       Passed - No positive pregnancy test in past 12 months        metFORMIN (GLUCOPHAGE) 500 MG tablet [Pharmacy Med Name: METFORMIN 500MG TABLETS] 360 tablet 0     Sig: TAKE 2 TABLETS(1000 MG) BY MOUTH TWICE DAILY WITH MEALS    Biguanide Agents Failed    8/2/2018 11:20 AM       Failed - Patient has had a Microalbumin in the past 12 mos.    Recent Labs   Lab Test  05/03/17   1143   MICROL  21   UMALCR  28.65*            Passed - Blood pressure less than 140/90 in past 6 months    BP Readings from Last 3 Encounters:   05/15/18 110/66   12/19/17 102/60   05/17/17 108/60                Passed - Patient has documented LDL within the past 12 mos.    Recent Labs   Lab Test  12/19/17   1709   LDL  155*            Passed - Patient is age 10 or older       Passed - Patient has documented A1c within the specified period of time.    If HgbA1C is 8 or greater, it needs to be on file within the past 3 months.  If less than 8, must be on file within the past 6 months.     Recent Labs   Lab Test  04/27/18   1144   A1C  6.8*            Passed - Patient's CR is NOT>1.4 OR Patient's EGFR is NOT<45 within past 12 mos.    Recent Labs   Lab Test  05/15/18   1147   GFRESTIMATED  49*   GFRESTBLACK  59*       Recent Labs   Lab Test  05/15/18   1147   CR  1.10*            Passed - Patient does NOT have a diagnosis of CHF.       Passed - Patient is not pregnant       Passed - Patient has not had a positive pregnancy test within the past 12 mos.        Passed - Recent (6 mo) or future (30 days) visit within the authorizing provider's specialty    Patient had office visit in the last 6 months or has a visit in the next 30 days with authorizing provider or within the authorizing provider's specialty.  See \"Patient Info\" tab in inbasket, or \"Choose Columns\" in Meds & Orders section of the refill encounter.            amLODIPine-benazepril (LOTREL) 10-40 MG per capsule [Pharmacy Med Name: AMLODIPINE-BENAZ 10/40MG CAPSULES] 90 " "capsule 0     Sig: TAKE 1 CAPSULE BY MOUTH DAILY    Calcium Channel Blockers Protocol  Failed    8/2/2018 11:21 AM       Failed - Normal serum creatinine on file in past 12 months    Recent Labs   Lab Test  05/15/18   1147   CR  1.10*            Passed - Blood pressure under 140/90 in past 12 months    BP Readings from Last 3 Encounters:   05/15/18 110/66   12/19/17 102/60   05/17/17 108/60                Passed - Recent (12 mo) or future (30 days) visit within the authorizing provider's specialty    Patient had office visit in the last 12 months or has a visit in the next 30 days with authorizing provider or within the authorizing provider's specialty.  See \"Patient Info\" tab in inbasket, or \"Choose Columns\" in Meds & Orders section of the refill encounter.           Passed - Patient is age 18 or older       Passed - No active pregnancy on record       Passed - No positive pregnancy test in past 12 months        LYRICA 100 MG capsule [Pharmacy Med Name: LYRICA 100MG CAPSULES] 180 capsule 0     Sig: TAKE ONE CAPSULE BY MOUTH TWICE DAILY    There is no refill protocol information for this order          "

## 2018-08-02 NOTE — TELEPHONE ENCOUNTER
Prescription approved per [facility] Refill Protocol.    Miki Nova RN, BSN

## 2018-08-03 RX ORDER — SITAGLIPTIN 50 MG/1
TABLET, FILM COATED ORAL
Qty: 90 TABLET | Refills: 0 | Status: SHIPPED | OUTPATIENT
Start: 2018-08-03 | End: 2018-09-10

## 2018-08-03 RX ORDER — GLIMEPIRIDE 4 MG/1
TABLET ORAL
Qty: 180 TABLET | Refills: 0 | Status: SHIPPED | OUTPATIENT
Start: 2018-08-03 | End: 2018-09-10

## 2018-08-03 RX ORDER — AMLODIPINE AND BENAZEPRIL HYDROCHLORIDE 10; 40 MG/1; MG/1
CAPSULE ORAL
Qty: 90 CAPSULE | Refills: 0 | Status: SHIPPED | OUTPATIENT
Start: 2018-08-03 | End: 2018-09-10

## 2018-08-03 RX ORDER — PREGABALIN 100 MG
CAPSULE ORAL
Qty: 180 CAPSULE | Refills: 0 | Status: SHIPPED | OUTPATIENT
Start: 2018-08-03 | End: 2018-09-10

## 2018-08-03 RX ORDER — SIMVASTATIN 20 MG
TABLET ORAL
Qty: 90 TABLET | Refills: 0 | Status: SHIPPED | OUTPATIENT
Start: 2018-08-03 | End: 2018-09-10

## 2018-08-08 ENCOUNTER — TELEPHONE (OUTPATIENT)
Dept: FAMILY MEDICINE | Facility: CLINIC | Age: 73
End: 2018-08-08

## 2018-08-08 DIAGNOSIS — E11.42 TYPE 2 DIABETES MELLITUS WITH DIABETIC POLYNEUROPATHY, WITHOUT LONG-TERM CURRENT USE OF INSULIN (H): ICD-10-CM

## 2018-08-08 NOTE — TELEPHONE ENCOUNTER
"Requested Prescriptions   Pending Prescriptions Disp Refills     pioglitazone (ACTOS) 45 MG tablet [Pharmacy Med Name: PIOGLITAZONE 45MG TABLETS] 90 tablet 0     Sig: TAKE 1 TABLET BY MOUTH DAILY    Thiazolidinedione Agents (TZDs)  Failed    8/8/2018 10:27 AM       Failed - Patient has had a Microalbumin in the past 12 mos.    Recent Labs   Lab Test  05/03/17   1143   MICROL  21   UMALCR  28.65*            Failed - Patient has a normal serum Creatinine in the past 12 months    Recent Labs   Lab Test  05/15/18   1147   CR  1.10*            Passed - Blood pressure less than 140/90 in past 6 months    BP Readings from Last 3 Encounters:   05/15/18 110/66   12/19/17 102/60   05/17/17 108/60                Passed - Patient has documented LDL within the past 12 mos.    Recent Labs   Lab Test  12/19/17   1709   LDL  155*            Passed - Patient has a normal ALT within the past 12 mos.    Recent Labs   Lab Test  05/15/18   1147   ALT  16            Passed - Patient has a normal AST within the past 12 mos.     Recent Labs   Lab Test  05/15/18   1147   AST  16            Passed - Patient has documented A1c within the specified period of time.    If HgbA1C is 8 or greater, it needs to be on file within the past 3 months.  If less than 8, must be on file within the past 6 months.     Recent Labs   Lab Test  04/27/18   1144   A1C  6.8*            Passed - Diagnosis not CHF       Passed - Patient is age 18 or older       Passed - Patient is not pregnant       Passed - Patient has not had a positive pregnancy test within the past 12 mos.       Passed - Recent (6 mo) or future (30 days) visit within the authorizing provider's specialty    Patient had office visit in the last 6 months or has a visit in the next 30 days with authorizing provider or within the authorizing provider's specialty.  See \"Patient Info\" tab in inbasket, or \"Choose Columns\" in Meds & Orders section of the refill encounter.            pioglitazone (ACTOS) " 45 MG tablet  Last Written Prescription Date:  11/19/17  Last Fill Quantity: 90,  # refills: 1   Last office visit: 5/15/2018 with prescribing provider:  Dr. Peguero   Future Office Visit:   Next 5 appointments (look out 90 days)     Sep 10, 2018  5:00 PM CDT   [facility]    [address] [place] [address] [phone]

## 2018-08-09 RX ORDER — PIOGLITAZONEHYDROCHLORIDE 45 MG/1
TABLET ORAL
Qty: 30 TABLET | Refills: 0 | Status: SHIPPED | OUTPATIENT
Start: 2018-08-09 | End: 2018-09-10

## 2018-08-09 NOTE — TELEPHONE ENCOUNTER
Routing refill request to provider for review/approval because:  Labs out of range:  creatinine  Labs not current:  microalbumin    Miki Nova RN, BSN

## 2018-08-09 NOTE — TELEPHONE ENCOUNTER
Given one month.  Please assist with scheduling follow up with Dr. Peguero for any additional refills

## 2018-08-13 ENCOUNTER — TELEPHONE (OUTPATIENT)
Dept: FAMILY MEDICINE | Facility: CLINIC | Age: 73
End: 2018-08-13

## 2018-08-13 DIAGNOSIS — E11.42 DIABETIC POLYNEUROPATHY ASSOCIATED WITH TYPE 2 DIABETES MELLITUS (H): ICD-10-CM

## 2018-08-13 RX ORDER — HYDROCODONE BITARTRATE AND ACETAMINOPHEN 5; 325 MG/1; MG/1
TABLET ORAL
Qty: 60 TABLET | Refills: 0 | Status: SHIPPED | OUTPATIENT
Start: 2018-08-13 | End: 2018-09-10

## 2018-08-13 NOTE — TELEPHONE ENCOUNTER
Reason for Call:  Medication or medication refill:    Do you use a [facility]?  Name of the pharmacy and phone number for the current request:  [facility], [address] [place] [address]    Name of the medication requested: HYDROcodone-acetaminophen (NORCO) 5-325 MG per tablet    Other request:  Daughter states mom usually gets 120 tabs.      Can we leave a detailed message on this number? YES    Phone number patient can be reached at: Cell number on file:    Telephone Information:   Mobile [phone]       Best Time: Any    Call taken on 8/13/2018 at 2:39 PM by Abdirizak Johnson

## 2018-08-13 NOTE — TELEPHONE ENCOUNTER
Requested Prescriptions   Pending Prescriptions Disp Refills     HYDROcodone-acetaminophen (NORCO) 5-325 MG per tablet 120 tablet 0     Sig: TAKE 1 TO 2 TABLETS BY MOUTH 3 TIMES DAILY AS NEEDED FOR MODERATE TO SVERE PAIN    There is no refill protocol information for this order        Routing refill request to provider for review/approval because:  Drug not on the [facility] refill protocol     Miki Nova RN, BSN

## 2018-08-14 NOTE — TELEPHONE ENCOUNTER
Will refill per chronic pain plan in chart (only 60 tabs, not 120 which is noted below). If patient is concerned about this quantity will need to review plan with pcp    [place] Prescription Monitoring Program was reviewed and confirmed past prescriptions. No variances were noted.      please place rx at front for  and update patient when completed.

## 2018-08-15 ENCOUNTER — TELEPHONE (OUTPATIENT)
Dept: FAMILY MEDICINE | Facility: CLINIC | Age: 73
End: 2018-08-15

## 2018-08-16 ENCOUNTER — MEDICAL CORRESPONDENCE (OUTPATIENT)
Dept: HEALTH INFORMATION MANAGEMENT | Facility: CLINIC | Age: 73
End: 2018-08-16

## 2018-09-10 ENCOUNTER — OFFICE VISIT (OUTPATIENT)
Dept: FAMILY MEDICINE | Facility: CLINIC | Age: 73
End: 2018-09-10
Payer: COMMERCIAL

## 2018-09-10 VITALS
HEART RATE: 101 BPM | HEIGHT: 57 IN | OXYGEN SATURATION: 98 % | SYSTOLIC BLOOD PRESSURE: 100 MMHG | DIASTOLIC BLOOD PRESSURE: 62 MMHG | BODY MASS INDEX: 35.17 KG/M2 | TEMPERATURE: 98 F | WEIGHT: 163 LBS | RESPIRATION RATE: 16 BRPM

## 2018-09-10 DIAGNOSIS — F51.04 CHRONIC INSOMNIA: ICD-10-CM

## 2018-09-10 DIAGNOSIS — Z23 NEED FOR PROPHYLACTIC VACCINATION AND INOCULATION AGAINST INFLUENZA: ICD-10-CM

## 2018-09-10 DIAGNOSIS — I10 ESSENTIAL HYPERTENSION WITH GOAL BLOOD PRESSURE LESS THAN 140/90: ICD-10-CM

## 2018-09-10 DIAGNOSIS — E11.42 TYPE 2 DIABETES MELLITUS WITH DIABETIC POLYNEUROPATHY, WITHOUT LONG-TERM CURRENT USE OF INSULIN (H): Primary | ICD-10-CM

## 2018-09-10 DIAGNOSIS — G89.4 CHRONIC PAIN SYNDROME: ICD-10-CM

## 2018-09-10 DIAGNOSIS — Z23 NEED FOR PROPHYLACTIC VACCINATION WITH TETANUS-DIPHTHERIA (TD): ICD-10-CM

## 2018-09-10 DIAGNOSIS — E66.01 MORBID OBESITY (H): ICD-10-CM

## 2018-09-10 DIAGNOSIS — E78.5 HYPERLIPIDEMIA LDL GOAL <100: ICD-10-CM

## 2018-09-10 DIAGNOSIS — E11.42 DIABETIC POLYNEUROPATHY ASSOCIATED WITH TYPE 2 DIABETES MELLITUS (H): ICD-10-CM

## 2018-09-10 DIAGNOSIS — Z23 NEED FOR PROPHYLACTIC VACCINATION AGAINST STREPTOCOCCUS PNEUMONIAE (PNEUMOCOCCUS): ICD-10-CM

## 2018-09-10 LAB — HBA1C MFR BLD: 8.2 % (ref 0–5.6)

## 2018-09-10 PROCEDURE — 80053 COMPREHEN METABOLIC PANEL: CPT | Performed by: FAMILY MEDICINE

## 2018-09-10 PROCEDURE — 99214 OFFICE O/P EST MOD 30 MIN: CPT | Performed by: FAMILY MEDICINE

## 2018-09-10 PROCEDURE — 80061 LIPID PANEL: CPT | Performed by: FAMILY MEDICINE

## 2018-09-10 PROCEDURE — 84443 ASSAY THYROID STIM HORMONE: CPT | Performed by: FAMILY MEDICINE

## 2018-09-10 PROCEDURE — 82043 UR ALBUMIN QUANTITATIVE: CPT | Performed by: FAMILY MEDICINE

## 2018-09-10 PROCEDURE — 36415 COLL VENOUS BLD VENIPUNCTURE: CPT | Performed by: FAMILY MEDICINE

## 2018-09-10 PROCEDURE — 83036 HEMOGLOBIN GLYCOSYLATED A1C: CPT | Performed by: FAMILY MEDICINE

## 2018-09-10 RX ORDER — GABAPENTIN 600 MG/1
TABLET ORAL
Refills: 1 | COMMUNITY
Start: 2017-10-05 | End: 2018-09-10

## 2018-09-10 RX ORDER — AMLODIPINE AND BENAZEPRIL HYDROCHLORIDE 10; 40 MG/1; MG/1
CAPSULE ORAL
Qty: 30 CAPSULE | Refills: 5 | Status: SHIPPED | OUTPATIENT
Start: 2018-09-10 | End: 2018-09-16

## 2018-09-10 RX ORDER — RAMELTEON 8 MG/1
8 TABLET ORAL AT BEDTIME
Qty: 30 TABLET | Refills: 5 | Status: SHIPPED | OUTPATIENT
Start: 2018-09-10 | End: 2020-01-21

## 2018-09-10 RX ORDER — HYDROCODONE BITARTRATE AND ACETAMINOPHEN 5; 325 MG/1; MG/1
TABLET ORAL
Qty: 60 TABLET | Refills: 0 | Status: SHIPPED | OUTPATIENT
Start: 2018-09-10 | End: 2018-10-04

## 2018-09-10 RX ORDER — PIOGLITAZONEHYDROCHLORIDE 45 MG/1
45 TABLET ORAL DAILY
Qty: 30 TABLET | Refills: 5 | Status: SHIPPED | OUTPATIENT
Start: 2018-09-10 | End: 2019-03-31

## 2018-09-10 RX ORDER — SIMVASTATIN 20 MG
TABLET ORAL
Qty: 30 TABLET | Refills: 5 | Status: SHIPPED | OUTPATIENT
Start: 2018-09-10 | End: 2019-05-17

## 2018-09-10 RX ORDER — PREGABALIN 100 MG/1
100 CAPSULE ORAL 2 TIMES DAILY
Qty: 60 CAPSULE | Refills: 5 | Status: SHIPPED | OUTPATIENT
Start: 2018-09-10 | End: 2019-04-04

## 2018-09-10 RX ORDER — GLIMEPIRIDE 4 MG/1
TABLET ORAL
Qty: 60 TABLET | Refills: 5 | Status: SHIPPED | OUTPATIENT
Start: 2018-09-10 | End: 2019-05-17

## 2018-09-10 NOTE — MR AVS SNAPSHOT
After Visit Summary   9/10/2018    Enrique Pruitt    MRN: [id number]           Patient Information     Date Of Birth          1945        Visit Information        Provider Department      9/10/2018 5:00 PM Lana Peguero MD [facility]        Today's Diagnoses     Type 2 diabetes mellitus with diabetic polyneuropathy, without long-term current use of insulin (H)    -  1    Morbid obesity (H)        Essential hypertension with goal blood pressure less than 140/90        Diabetic polyneuropathy associated with type 2 diabetes mellitus        Chronic pain syndrome        Chronic insomnia        Need for prophylactic vaccination and inoculation against influenza        Need for prophylactic vaccination against Streptococcus pneumoniae (pneumococcus)        Need for prophylactic vaccination with tetanus-diphtheria (TD)        Hyperlipidemia LDL goal <100           Follow-ups after your visit        Follow-up notes from your care team     Return in about 6 months (around 3/10/2019) for Diabetes Recheck with Previsit Labs.      Who to contact     If you have questions or need follow up information about today's clinic visit or your schedule please contact [facility] directly at [phone].  Normal or non-critical lab and imaging results will be communicated to you by Zenringhart, letter or phone within 4 business days after the clinic has received the results. If you do not hear from us within 7 days, please contact the clinic through Zenringhart or phone. If you have a critical or abnormal lab result, we will notify you by phone as soon as possible.  Submit refill requests through tagUin or call your pharmacy and they will forward the refill request to us. Please allow 3 business days for your refill to be completed.          Additional Information About Your Visit        Zenringhart Information     tagUin lets you send messages to your doctor, view your test  "results, renew your prescriptions, schedule appointments and more. To sign up, go to [email or web address] . Click on \"Log in\" on the left side of the screen, which will take you to the Welcome page. Then click on \"Sign up Now\" on the right side of the page.     You will be asked to enter the access code listed below, as well as some personal information. Please follow the directions to create your username and password.     Your access code is: [email or web address]  Expires: 2018  5:24 PM     Your access code will  in 90 days. If you need help or a new code, please call your [place] clinic or [phone].        Care EveryWhere ID     This is your Care EveryWhere ID. This could be used by other organizations to access your [place] medical records  [id number]        Your Vitals Were     Pulse Temperature Respirations Height Pulse Oximetry BMI (Body Mass Index)    101 98  F (36.7  C) (Oral) 16 1.448 m (4' 9\") 98% 35.27 kg/m2       Blood Pressure from Last 3 Encounters:   09/10/18 100/62   05/15/18 110/66   17 102/60    Weight from Last 3 Encounters:   09/10/18 73.9 kg (163 lb)   05/15/18 72.1 kg (159 lb)   17 64.2 kg (141 lb 9.6 oz)              We Performed the Following     Albumin Random Urine Quantitative with Creat Ratio     Comprehensive metabolic panel     Hemoglobin A1c     Lipid panel reflex to direct LDL Non-fasting     TSH with free T4 reflex          Today's Medication Changes          These changes are accurate as of 9/10/18  5:24 PM.  If you have any questions, ask your nurse or doctor.               These medicines have changed or have updated prescriptions.        Dose/Directions    pioglitazone 45 MG tablet   Commonly known as:  ACTOS   This may have changed:  See the new instructions.   Used for:  Type 2 diabetes mellitus with diabetic polyneuropathy, without long-term current use of insulin (H)   Changed by:  Lana Peguero MD        Dose:  45 mg   Take 1 tablet (45 " mg) by mouth daily   Quantity:  30 tablet   Refills:  5       pregabalin 100 MG capsule   Commonly known as:  LYRICA   This may have changed:  See the new instructions.   Used for:  Diabetic polyneuropathy associated with type 2 diabetes mellitus (H)   Changed by:  Lana Peguero MD        Dose:  100 mg   Take 1 capsule (100 mg) by mouth 2 times daily   Quantity:  60 capsule   Refills:  5       sitagliptin 50 MG tablet   Commonly known as:  JANUVIA   This may have changed:  See the new instructions.   Used for:  Type 2 diabetes mellitus with diabetic polyneuropathy, without long-term current use of insulin (H)   Changed by:  Lana Peguero MD        Dose:  50 mg   Take 1 tablet (50 mg) by mouth daily   Quantity:  30 tablet   Refills:  5       triamcinolone 0.1 % cream   Commonly known as:  KENALOG   This may have changed:  Another medication with the same name was removed. Continue taking this medication, and follow the directions you see here.   Used for:  Dermatitis   Changed by:  Lana Peguero MD        APPLY EXTERNALLY TO THE AFFECTED AREA TWICE DAILY AS NEEDED FOR IRRITATION   Quantity:  80 g   Refills:  0         Stop taking these medicines if you haven't already. Please contact your care team if you have questions.     gabapentin 600 MG tablet   Commonly known as:  NEURONTIN   Stopped by:  Lana Peguero MD           order for DME   Stopped by:  Lana Peguero MD           order for DME   Stopped by:  Lana Peguero MD           order for DME   Stopped by:  Lana Peguero MD           order for DME   Stopped by:  Lana Peguero MD           order for DME   Stopped by:  Lana Peguero MD           order for DME   Stopped by:  Lana Peguero MD           STATIN NOT PRESCRIBED (INTENTIONAL)   Stopped by:  Lana Peguero MD                Where to get your medicines      These medications were sent to [id number] [place] - [address] AT [address], [place] [address]     Phone:  [phone]     amLODIPine-benazepril 10-40 MG per capsule    glimepiride 4 MG tablet    metFORMIN 500 MG tablet    pioglitazone 45 MG tablet    ramelteon 8 MG tablet    simvastatin 20 MG tablet    sitagliptin 50 MG tablet         Some of these will need a paper prescription and others can be bought over the counter.  Ask your nurse if you have questions.     Bring a paper prescription for each of these medications     HYDROcodone-acetaminophen 5-325 MG per tablet    order for DME    pregabalin 100 MG capsule               Information about OPIOIDS     PRESCRIPTION OPIOIDS: WHAT YOU NEED TO KNOW   We gave you an opioid (narcotic) pain medicine. It is important to manage your pain, but opioids are not always the best choice. You should first try all the other options your care team gave you. Take this medicine for as short a time (and as few doses) as possible.    Some activities can increase your pain, such as bandage changes or therapy sessions. It may help to take your pain medicine 30 to 60 minutes before these activities. Reduce your stress by getting enough sleep, working on hobbies you enjoy and practicing relaxation or meditation. Talk to your care team about ways to manage your pain beyond prescription opioids.    These medicines have risks:    DO NOT drive when on new or higher doses of pain medicine. These medicines can affect your alertness and reaction times, and you could be arrested for driving under the influence (DUI). If you need to use opioids long-term, talk to your care team about driving.    DO NOT operate heavy machinery    DO NOT do any other dangerous activities while taking these medicines.    DO NOT drink any alcohol while taking these medicines.     If the opioid prescribed includes acetaminophen, DO NOT take with any other medicines that contain  acetaminophen. Read all labels carefully. Look for the word  acetaminophen  or  Tylenol.  Ask your pharmacist if you have questions or are unsure.    You can get addicted to pain medicines, especially if you have a history of addiction (chemical, alcohol or substance dependence). Talk to your care team about ways to reduce this risk.    All opioids tend to cause constipation. Drink plenty of water and eat foods that have a lot of fiber, such as fruits, vegetables, prune juice, apple juice and high-fiber cereal. Take a laxative (Miralax, milk of magnesia, Colace, Senna) if you don t move your bowels at least every other day. Other side effects include upset stomach, sleepiness, dizziness, throwing up, tolerance (needing more of the medicine to have the same effect), physical dependence and slowed breathing.    Store your pills in a secure place, locked if possible. We will not replace any lost or stolen medicine. If you don t finish your medicine, please throw away (dispose) as directed by your pharmacist. The [place] Pollution [employer] has more information about safe disposal: https://www.pca.[facility].us/living-green/managing-unwanted-medications         Primary Care Provider Office Phone # Fax #    Lana Titi Peguero [phone] [address] [facility] [address]        Equal Access to Services     DANIELLA FUNG : Hadii valerie izaguirre hadasho Soomaali, waaxda luqadaha, qaybta kaalmada adeegyada, bacilio varela. So [place] [phone].    ATENCIÓN: Si habla español, tiene a ulrich disposición servicios gratuitos de asistencia lingüística. Llame al [phone].    We comply with applicable federal civil rights laws and [place] laws. We do not discriminate on the basis of race, color, national origin, age, disability, sex, sexual orientation, or gender identity.            Thank you!     Thank you for choosing [facility]  for your care. Our  goal is always to provide you with excellent care. Hearing back from our patients is one way we can continue to improve our services. Please take a few minutes to complete the written survey that you may receive in the mail after your visit with us. Thank you!             Your Updated Medication List - Protect others around you: Learn how to safely use, store and throw away your medicines at www.disposemymeds.org.          This list is accurate as of 9/10/18  5:24 PM.  Always use your most recent med list.                   Brand Name Dispense Instructions for use Diagnosis    amLODIPine-benazepril 10-40 MG per capsule    LOTREL    30 capsule    TAKE 1 CAPSULE BY MOUTH DAILY    Essential hypertension with goal blood pressure less than 140/90       * REGINE CONTOUR test strip   Generic drug:  blood glucose monitoring     300 strip    USE TO TEST THREE TIMES DAILY OR AS DIRECTED    Type 2 diabetes mellitus with diabetic polyneuropathy (H)       * REGINE CONTOUR test strip   Generic drug:  blood glucose monitoring     300 strip    USE TO TEST THREE TIMES DAILY OR AS DIRECTED    Type 2 diabetes mellitus with diabetic polyneuropathy (H)       docusate sodium 100 MG capsule    COLACE    30 capsule    Take 1 capsule by mouth 2 times daily as needed for constipation.    Constipation       Fluocinonide 0.1 % cream    LIDEX    60 g    Externally apply topically 2 times daily as needed    Dermatitis       fluticasone 50 MCG/ACT spray    FLONASE    16 g    SPRAY 1-2 SPRAYS INTO BOTH NOSTRILS DAILY    Chronic rhinitis       glimepiride 4 MG tablet    AMARYL    60 tablet    TAKE 1 TABLET(4 MG) BY MOUTH TWICE DAILY    Type 2 diabetes mellitus with diabetic polyneuropathy, without long-term current use of insulin (H)       HYDROcodone-acetaminophen 5-325 MG per tablet    NORCO    60 tablet    TAKE 1 TO 2 TABLETS BY MOUTH 3 TIMES DAILY AS NEEDED FOR MODERATE TO SVERE PAIN    Diabetic polyneuropathy associated with type 2 diabetes  mellitus (H)       metFORMIN 500 MG tablet    GLUCOPHAGE    120 tablet    TAKE 2 TABLETS(1000 MG) BY MOUTH TWICE DAILY WITH MEALS    Type 2 diabetes mellitus with diabetic polyneuropathy, without long-term current use of insulin (H)       omeprazole 20 MG CR capsule    priLOSEC    30 capsule    TAKE 1 CAPSULE(20 MG) BY MOUTH DAILY 30 TO 60 MINUTES BEFORE THE EVENING MEAL    Gastroesophageal reflux disease without esophagitis       order for DME     200 Units    Equipment being ordered: Test strips - accu-check smartview Tests twice daily due to uncontrolled sugar    Type 2 diabetes mellitus with diabetic polyneuropathy, without long-term current use of insulin (H)       pioglitazone 45 MG tablet    ACTOS    30 tablet    Take 1 tablet (45 mg) by mouth daily    Type 2 diabetes mellitus with diabetic polyneuropathy, without long-term current use of insulin (H)       pregabalin 100 MG capsule    LYRICA    60 capsule    Take 1 capsule (100 mg) by mouth 2 times daily    Diabetic polyneuropathy associated with type 2 diabetes mellitus (H)       ramelteon 8 MG tablet    ROZEREM    30 tablet    Take 1 tablet (8 mg) by mouth At Bedtime    Chronic insomnia       simvastatin 20 MG tablet    ZOCOR    30 tablet    TAKE 1 TABLET(20 MG) BY MOUTH AT BEDTIME    Hyperlipidemia LDL goal <100       sitagliptin 50 MG tablet    JANUVIA    30 tablet    Take 1 tablet (50 mg) by mouth daily    Type 2 diabetes mellitus with diabetic polyneuropathy, without long-term current use of insulin (H)       triamcinolone 0.1 % cream    KENALOG    80 g    APPLY EXTERNALLY TO THE AFFECTED AREA TWICE DAILY AS NEEDED FOR IRRITATION    Dermatitis       * Notice:  This list has 2 medication(s) that are the same as other medications prescribed for you. Read the directions carefully, and ask your doctor or other care provider to review them with you.

## 2018-09-10 NOTE — PROGRESS NOTES
"  SUBJECTIVE:   Enrique Pruitt is a 73 year old female who presents to clinic today for the following health issues:   Patient reported nothing has changed.     Patient is here today to recheck kidney functions and refill her medications.     SUBJECTIVE:  Here today with daughter in routine follow-up of diabetes hypertension, and diabetic polyneuropathy.  The latter has been quite painful for the patient for some time and she was on gabapentin originally and most recently Lyrica.  Has used some hydrocodone to help deal with the pain in her legs and feet.  But we had a discussion recently about safe excepted levels of the use of this medication I really do not want her using it more than twice a day.  I understand that she used to use it more previously but I think this is a safer level.  We does not get a lot of physical activity.  Says she watches her diet but is been unable to loosening weight.  We discussed the importance of even gentle exercise in helping control blood pressure and diabetes.  Long discussion about flu shot, pneumonia, shingles and tetanus.  She is not interested vaccination at all despite our discussion.    Review of systems otherwise negative.  Past medical, family, and social history reviewed and updated in chart.    OBJECTIVE:  /62 (BP Location: Right arm, Patient Position: Sitting, Cuff Size: Adult Regular)  Pulse 101  Temp 98  F (36.7  C) (Oral)  Resp 16  Ht 1.448 m (4' 9\")  Wt 73.9 kg (163 lb)  SpO2 98%  BMI 35.27 kg/m2  Alert, pleasant, upbeat, and in no apparent discomfort.  Normal in appearance with only some dry skin.  NoS1 and S2 normal, no murmurs, clicks, gallops or rubs. Regular rate and rhythm. Chest is clear; no wheezes or rales. No edema or JVD.  Feet -obvious breakdown or lesions.  Capillary refill is decent.  Gross objective neurologic exam normal  Past labs reviewed with the patient.     ASSESSMENT / PLAN:  (E11.42) Type 2 diabetes mellitus with diabetic " polyneuropathy, without long-term current use of insulin (H)  (primary encounter diagnosis)  Comment: Generally A1c has been well controlled and we will recheck.  Encourage some exercise to keep things under control.  Full lab recheck  Plan: Albumin Random Urine Quantitative with Creat         Ratio, order for DME, pioglitazone (ACTOS) 45         MG tablet, glimepiride (AMARYL) 4 MG tablet,         sitagliptin (JANUVIA) 50 MG tablet, metFORMIN         (GLUCOPHAGE) 500 MG tablet, Hemoglobin A1c,         Comprehensive metabolic panel, Lipid panel         reflex to direct LDL Non-fasting, TSH with free        T4 reflex            (E66.01) Morbid obesity (H)  Comment: Lifestyle as above  Plan:     (I10) Essential hypertension with goal blood pressure less than 140/90  Comment: Well-controlled on current regimen.  May be able to back down on this over time  Plan: amLODIPine-benazepril (LOTREL) 10-40 MG per         capsule            (E11.42) Diabetic polyneuropathy associated with type 2 diabetes mellitus  Comment:   Plan: pregabalin (LYRICA) 100 MG capsule,         HYDROcodone-acetaminophen (NORCO) 5-325 MG per         tablet        Discussion as above    (G89.4) Chronic pain syndrome  Comment: Discussion as above  Plan:     (F51.04) Chronic insomnia  Comment:   Plan: ramelteon (ROZEREM) 8 MG tablet            (Z23) Need for prophylactic vaccination and inoculation against influenza  Comment:   Plan:     (Z23) Need for prophylactic vaccination against Streptococcus pneumoniae (pneumococcus)  Comment:   Plan:     (Z23) Need for prophylactic vaccination with tetanus-diphtheria (TD)  Comment:   Plan:     (E78.5) Hyperlipidemia LDL goal <100  Comment:   Plan: simvastatin (ZOCOR) 20 MG tablet            Follow up 6 months or as indicated by lab work  S. Titi Peguero MD    (Chart documentation completed in part with Dragon voice-recognition software.  Even though reviewed some grammatical, spelling, and word errors may  remain.)

## 2018-09-11 LAB
ALBUMIN SERPL-MCNC: 3.5 G/DL (ref 3.4–5)
ALP SERPL-CCNC: 114 U/L (ref 40–150)
ALT SERPL W P-5'-P-CCNC: 15 U/L (ref 0–50)
ANION GAP SERPL CALCULATED.3IONS-SCNC: 9 MMOL/L (ref 3–14)
AST SERPL W P-5'-P-CCNC: 14 U/L (ref 0–45)
BILIRUB SERPL-MCNC: 0.3 MG/DL (ref 0.2–1.3)
BUN SERPL-MCNC: 50 MG/DL (ref 7–30)
CALCIUM SERPL-MCNC: 8.6 MG/DL (ref 8.5–10.1)
CHLORIDE SERPL-SCNC: 108 MMOL/L (ref 94–109)
CHOLEST SERPL-MCNC: 212 MG/DL
CO2 SERPL-SCNC: 21 MMOL/L (ref 20–32)
CREAT SERPL-MCNC: 1.71 MG/DL (ref 0.52–1.04)
CREAT UR-MCNC: 152 MG/DL
GFR SERPL CREATININE-BSD FRML MDRD: 29 ML/MIN/1.7M2
GLUCOSE SERPL-MCNC: 232 MG/DL (ref 70–99)
HDLC SERPL-MCNC: 48 MG/DL
LDLC SERPL CALC-MCNC: 125 MG/DL
MICROALBUMIN UR-MCNC: 20 MG/L
MICROALBUMIN/CREAT UR: 12.96 MG/G CR (ref 0–25)
NONHDLC SERPL-MCNC: 164 MG/DL
POTASSIUM SERPL-SCNC: 5.4 MMOL/L (ref 3.4–5.3)
PROT SERPL-MCNC: 7.3 G/DL (ref 6.8–8.8)
SODIUM SERPL-SCNC: 138 MMOL/L (ref 133–144)
TRIGL SERPL-MCNC: 194 MG/DL
TSH SERPL DL<=0.005 MIU/L-ACNC: 0.91 MU/L (ref 0.4–4)

## 2018-09-16 DIAGNOSIS — I10 ESSENTIAL HYPERTENSION WITH GOAL BLOOD PRESSURE LESS THAN 140/90: ICD-10-CM

## 2018-09-16 DIAGNOSIS — E11.42 TYPE 2 DIABETES MELLITUS WITH DIABETIC POLYNEUROPATHY, WITHOUT LONG-TERM CURRENT USE OF INSULIN (H): Primary | ICD-10-CM

## 2018-09-16 RX ORDER — AMLODIPINE BESYLATE 10 MG/1
10 TABLET ORAL DAILY
Qty: 90 TABLET | Refills: 0 | Status: SHIPPED | OUTPATIENT
Start: 2018-09-16 | End: 2018-12-17

## 2018-09-17 ENCOUNTER — TELEPHONE (OUTPATIENT)
Dept: FAMILY MEDICINE | Facility: CLINIC | Age: 73
End: 2018-09-17

## 2018-09-17 NOTE — TELEPHONE ENCOUNTER
This writer attempted to contact daughter cadence Nunez on 09/17/18      Reason for call results and left message - in last return call, did not say whether or not it was okay to leave a detailed message on voicemail.      If patient calls back:   Patient contacted by a Registered Nurse. Inform patient that someone from the RN group will contact them, document that pt called and route to P DYAD 3 RN POOL [[id number]]        Zaida Nunez RN

## 2018-09-17 NOTE — TELEPHONE ENCOUNTER
Patient returned call    Best number to reach caller: Home number on file [phone] (home)    Is it ok to leave a detailed message: YES     Patient is available for another 15 minutes as of 1 pm.

## 2018-09-17 NOTE — PROGRESS NOTES
Call daughter (and send a copy of labs):  Her kidney function is not normal.  One part of her BP medicine (the benazepril part) may be causing this.  I want to change her to just amlodipine only - I sent in the new prescription.  Also avoid any ibuprofen or aleve.  Let's have her do labs in 4-6 weeks and see me a few days later.  Make sure she takes her diabetes medicines as well - her A1c is too high.    TIMMY Peguero M.D.

## 2018-09-17 NOTE — TELEPHONE ENCOUNTER
Spoke with daughter of patient to relay provider message. She states understanding and has no further questions. Will  Rx today and patient is scheduled for a lab appointment in 4 weeks and then an appointment with PCP a couple of days later.     Team, please mail a copy of labs as provider documented in result note.    Zaida Nunez RN

## 2018-09-17 NOTE — TELEPHONE ENCOUNTER
Notes Recorded by Lana Peguero MD on 9/16/2018 at 9:01 PM  Call daughter (and send a copy of labs):  Her kidney function is not normal.  One part of her BP medicine (the benazepril part) may be causing this.  I want to change her to just amlodipine only - I sent in the new prescription.  Also avoid any ibuprofen or aleve.  Let's have her do labs in 4-6 weeks and see me a few days later.  Make sure she takes her diabetes medicines as well - her A1c is too high.    TIMMY Peguero M.D.    This writer attempted to contact daughter cadence Nunez on 09/17/18      Reason for call results and left message.      If patient calls back:   Patient contacted by a Registered Nurse. Inform patient that someone from the RN group will contact them, document that pt called and route to P DYAD 3 RN POOL [[id number]]        Zaida Nunez RN

## 2018-10-04 ENCOUNTER — TELEPHONE (OUTPATIENT)
Dept: FAMILY MEDICINE | Facility: CLINIC | Age: 73
End: 2018-10-04

## 2018-10-04 DIAGNOSIS — E11.42 DIABETIC POLYNEUROPATHY ASSOCIATED WITH TYPE 2 DIABETES MELLITUS (H): ICD-10-CM

## 2018-10-04 DIAGNOSIS — G89.4 CHRONIC PAIN SYNDROME: ICD-10-CM

## 2018-10-04 RX ORDER — HYDROCODONE BITARTRATE AND ACETAMINOPHEN 5; 325 MG/1; MG/1
TABLET ORAL
Qty: 60 TABLET | Refills: 0 | Status: SHIPPED | OUTPATIENT
Start: 2018-10-05 | End: 2018-10-17

## 2018-10-04 NOTE — TELEPHONE ENCOUNTER
Controlled Substance Refill Request for Norco 5-325 mg tabs  Problem List Complete:  Yes   checked in past 3 months?  Yes , 9/10/18     [place] reviewed[place]  not reviewed/not due - last done on 9/10/18    [place]  [email or web address]    Last Written Prescription Date:  9/10/18  Last Fill Quantity: 60,  # refills: 0   Last office visit: 9/10/2018 with prescribing provider:     Future Office Visit:   Next 5 appointments (look out 90 days)     Oct 17, 2018  3:40 PM CDT   Office Visit with Lana Peguero MD   [facility]    [address] [place] [id number] [phone]                 Routing refill request to provider for review/approval because:  Drug not on the [facility] refill protocol     Abby Medina RN  [facility]

## 2018-10-04 NOTE — TELEPHONE ENCOUNTER
Reason for Call:  Medication or medication refill:    Do you use a [facility]?  Name of the pharmacy and phone number for the current request:  Written RX    Name of the medication requested: Pending Prescriptions:                       Disp   Refills    HYDROcodone-acetaminophen (NORCO) 5-325 M*60 tab*0            Sig: TAKE 1 TO 2 TABLETS BY MOUTH 3 TIMES DAILY AS           NEEDED FOR MODERATE TO SVERE PAIN    Other request: Please call when approved daughter will  THAMMAVONGSA,OUDOMPHONE.    Can we leave a detailed message on this number? YES    Phone number patient can be reached at: [phone]    Best Time: any    Call taken on 10/4/2018 at 1:38 PM by Debi Nava

## 2018-10-05 NOTE — TELEPHONE ENCOUNTER
Reason for Call:  Other prescription    Detailed comments:  Pt calling for she would like to chepe permission for her spouse  [place] to  hard copy of Rx at .     Phone Number Patient can be reached at: Home number on file [phone] (home)    Best Time: anytime    Can we leave a detailed message on this number? YES    Call taken on 10/5/2018 at 11:00 AM by Chano Deleon

## 2018-10-15 DIAGNOSIS — E11.42 TYPE 2 DIABETES MELLITUS WITH DIABETIC POLYNEUROPATHY, WITHOUT LONG-TERM CURRENT USE OF INSULIN (H): ICD-10-CM

## 2018-10-15 LAB
ANION GAP SERPL CALCULATED.3IONS-SCNC: 6 MMOL/L (ref 3–14)
BUN SERPL-MCNC: 24 MG/DL (ref 7–30)
CALCIUM SERPL-MCNC: 8.8 MG/DL (ref 8.5–10.1)
CHLORIDE SERPL-SCNC: 109 MMOL/L (ref 94–109)
CO2 SERPL-SCNC: 27 MMOL/L (ref 20–32)
CREAT SERPL-MCNC: 1.17 MG/DL (ref 0.52–1.04)
GFR SERPL CREATININE-BSD FRML MDRD: 45 ML/MIN/1.7M2
GLUCOSE SERPL-MCNC: 154 MG/DL (ref 70–99)
HBA1C MFR BLD: 7.8 % (ref 0–5.6)
POTASSIUM SERPL-SCNC: 4.7 MMOL/L (ref 3.4–5.3)
SODIUM SERPL-SCNC: 142 MMOL/L (ref 133–144)

## 2018-10-15 PROCEDURE — 36415 COLL VENOUS BLD VENIPUNCTURE: CPT | Performed by: FAMILY MEDICINE

## 2018-10-15 PROCEDURE — 80048 BASIC METABOLIC PNL TOTAL CA: CPT | Performed by: FAMILY MEDICINE

## 2018-10-15 PROCEDURE — 83036 HEMOGLOBIN GLYCOSYLATED A1C: CPT | Performed by: FAMILY MEDICINE

## 2018-10-17 ENCOUNTER — OFFICE VISIT (OUTPATIENT)
Dept: FAMILY MEDICINE | Facility: CLINIC | Age: 73
End: 2018-10-17
Payer: COMMERCIAL

## 2018-10-17 VITALS
DIASTOLIC BLOOD PRESSURE: 58 MMHG | OXYGEN SATURATION: 98 % | WEIGHT: 166 LBS | TEMPERATURE: 97.8 F | SYSTOLIC BLOOD PRESSURE: 102 MMHG | BODY MASS INDEX: 35.81 KG/M2 | HEIGHT: 57 IN | HEART RATE: 105 BPM

## 2018-10-17 DIAGNOSIS — I10 HYPERTENSION GOAL BP (BLOOD PRESSURE) < 140/90: ICD-10-CM

## 2018-10-17 DIAGNOSIS — R79.89 ELEVATED SERUM CREATININE: Primary | ICD-10-CM

## 2018-10-17 DIAGNOSIS — Z23 NEED FOR PROPHYLACTIC VACCINATION AGAINST STREPTOCOCCUS PNEUMONIAE (PNEUMOCOCCUS): ICD-10-CM

## 2018-10-17 DIAGNOSIS — Z23 NEED FOR PROPHYLACTIC VACCINATION AND INOCULATION AGAINST INFLUENZA: ICD-10-CM

## 2018-10-17 DIAGNOSIS — Z23 NEED FOR PROPHYLACTIC VACCINATION WITH TETANUS-DIPHTHERIA (TD): ICD-10-CM

## 2018-10-17 DIAGNOSIS — E11.42 TYPE 2 DIABETES MELLITUS WITH DIABETIC POLYNEUROPATHY, WITHOUT LONG-TERM CURRENT USE OF INSULIN (H): ICD-10-CM

## 2018-10-17 DIAGNOSIS — E11.42 DIABETIC POLYNEUROPATHY ASSOCIATED WITH TYPE 2 DIABETES MELLITUS (H): ICD-10-CM

## 2018-10-17 PROCEDURE — 90471 IMMUNIZATION ADMIN: CPT | Performed by: FAMILY MEDICINE

## 2018-10-17 PROCEDURE — 90670 PCV13 VACCINE IM: CPT | Performed by: FAMILY MEDICINE

## 2018-10-17 PROCEDURE — 99214 OFFICE O/P EST MOD 30 MIN: CPT | Mod: 25 | Performed by: FAMILY MEDICINE

## 2018-10-17 RX ORDER — HYDROCODONE BITARTRATE AND ACETAMINOPHEN 5; 325 MG/1; MG/1
TABLET ORAL
Qty: 60 TABLET | Refills: 0 | Status: SHIPPED | OUTPATIENT
Start: 2018-10-31 | End: 2019-01-07

## 2018-10-17 ASSESSMENT — PAIN SCALES - GENERAL: PAINLEVEL: NO PAIN (0)

## 2018-10-17 NOTE — NURSING NOTE
Screening Questionnaire for Adult Immunization    Are you sick today?   No   Do you have allergies to medications, food, a vaccine component or latex?   No   Have you ever had a serious reaction after receiving a vaccination?   No   Do you have a long-term health problem with heart disease, lung disease, asthma, kidney disease, metabolic disease (e.g. diabetes), anemia, or other blood disorder?   No   Do you have cancer, leukemia, HIV/AIDS, or any other immune system problem?   No   In the past 3 months, have you taken medications that affect  your immune system, such as prednisone, other steroids, or anticancer drugs; drugs for the treatment of rheumatoid arthritis, Crohn s disease, or psoriasis; or have you had radiation treatments?   No   Have you had a seizure, or a brain or other nervous system problem?   No   During the past year, have you received a transfusion of blood or blood     products, or been given immune (gamma) globulin or antiviral drug?   No   For women: Are you pregnant or is there a chance you could become        pregnant during the next month?   No   Have you received any vaccinations in the past 4 weeks?   No     Immunization questionnaire answers were all negative.        Per orders of Dr. Peguero, injection of Prevnar 13 given by Kendal Gregg. Patient instructed to remain in clinic for 15 minutes afterwards, and to report any adverse reaction to me immediately.       Screening performed by Kendal Gregg on 10/17/2018 at 3:16 PM.

## 2018-10-17 NOTE — MR AVS SNAPSHOT
After Visit Summary   10/17/2018    Enrique Pruitt    MRN: [id number]           Patient Information     Date Of Birth          1945        Visit Information        Provider Department      10/17/2018 3:40 PM Lana Peguero MD [facility]        Today's Diagnoses     Elevated serum creatinine    -  1    Type 2 diabetes mellitus with diabetic polyneuropathy, without long-term current use of insulin (H)        Hypertension goal BP (blood pressure) < 140/90        Diabetic polyneuropathy associated with type 2 diabetes mellitus        Need for prophylactic vaccination and inoculation against influenza        Need for prophylactic vaccination against Streptococcus pneumoniae (pneumococcus)        Need for prophylactic vaccination with tetanus-diphtheria (Td)           Follow-ups after your visit        Follow-up notes from your care team     Return in about 6 months (around 4/17/2019) for Diabetes Recheck with Previsit Labs.      Who to contact     If you have questions or need follow up information about today's clinic visit or your schedule please contact [facility] directly at [phone].  Normal or non-critical lab and imaging results will be communicated to you by iSquare[employer], letter or phone within 4 business days after the clinic has received the results. If you do not hear from us within 7 days, please contact the clinic through Truminim[employer] or phone. If you have a critical or abnormal lab result, we will notify you by phone as soon as possible.  Submit refill requests through [employer] or call your pharmacy and they will forward the refill request to us. Please allow 3 business days for your refill to be completed.          Additional Information About Your Visit        [employer] Information     [employer] lets you send messages to your doctor, view your test results, renew your prescriptions, schedule appointments and more. To sign up, go to  [email or web address] . Click on \"Log in\" on the left side of the screen, which will take you to the Welcome page. Then click on \"Sign up Now\" on the right side of the page.     You will be asked to enter the access code listed below, as well as some personal information. Please follow the directions to create your username and password.     Your access code is: [email or web address]  Expires: 2018  5:24 PM     Your access code will  in 90 days. If you need help or a new code, please call your [place] clinic or [phone].        Care EveryWhere ID     This is your Care EveryWhere ID. This could be used by other organizations to access your [place] medical records  [id number]        Your Vitals Were     Pulse Temperature Height Last Period Pulse Oximetry Breastfeeding?    105 97.8  F (36.6  C) (Oral) 1.448 m (4' 9\") (LMP Unknown) 98% No    BMI (Body Mass Index)                   35.92 kg/m2            Blood Pressure from Last 3 Encounters:   10/17/18 102/58   09/10/18 100/62   05/15/18 110/66    Weight from Last 3 Encounters:   10/17/18 75.3 kg (166 lb)   09/10/18 73.9 kg (163 lb)   05/15/18 72.1 kg (159 lb)              We Performed the Following     Pneumococcal vaccine 13 valent PCV13 IM (Prevnar) [84639]     VACCINE ADMINISTRATION, INITIAL          Today's Medication Changes          These changes are accurate as of 10/17/18 11:59 PM.  If you have any questions, ask your nurse or doctor.               Start taking these medicines.        Dose/Directions    ACE/ARB/ARNI NOT PRESCRIBED (INTENTIONAL)   Used for:  Type 2 diabetes mellitus with diabetic polyneuropathy, without long-term current use of insulin (H)   Started by:  Lana Peguero MD        Please choose reason not prescribed, below   Refills:  0         These medicines have changed or have updated prescriptions.        Dose/Directions    HYDROcodone-acetaminophen 5-325 MG per tablet   Commonly known as:  NORCO   This may have changed:  " These instructions start on 10/31/2018. If you are unsure what to do until then, ask your doctor or other care provider.   Used for:  Diabetic polyneuropathy associated with type 2 diabetes mellitus (H)   Changed by:  Lana Peguero MD        Start taking on:  10/31/2018   TAKE 1 TO 2 TABLETS BY MOUTH 3 TIMES DAILY AS NEEDED FOR MODERATE TO SVERE PAIN   Quantity:  60 tablet   Refills:  0            Where to get your medicines      Some of these will need a paper prescription and others can be bought over the counter.  Ask your nurse if you have questions.     Bring a paper prescription for each of these medications     HYDROcodone-acetaminophen 5-325 MG per tablet       You don't need a prescription for these medications     ACE/ARB/ARNI NOT PRESCRIBED (INTENTIONAL)               Information about OPIOIDS     PRESCRIPTION OPIOIDS: WHAT YOU NEED TO KNOW   We gave you an opioid (narcotic) pain medicine. It is important to manage your pain, but opioids are not always the best choice. You should first try all the other options your care team gave you. Take this medicine for as short a time (and as few doses) as possible.    Some activities can increase your pain, such as bandage changes or therapy sessions. It may help to take your pain medicine 30 to 60 minutes before these activities. Reduce your stress by getting enough sleep, working on hobbies you enjoy and practicing relaxation or meditation. Talk to your care team about ways to manage your pain beyond prescription opioids.    These medicines have risks:    DO NOT drive when on new or higher doses of pain medicine. These medicines can affect your alertness and reaction times, and you could be arrested for driving under the influence (DUI). If you need to use opioids long-term, talk to your care team about driving.    DO NOT operate heavy machinery    DO NOT do any other dangerous activities while taking these medicines.    DO NOT drink any alcohol while  taking these medicines.     If the opioid prescribed includes acetaminophen, DO NOT take with any other medicines that contain acetaminophen. Read all labels carefully. Look for the word  acetaminophen  or  Tylenol.  Ask your pharmacist if you have questions or are unsure.    You can get addicted to pain medicines, especially if you have a history of addiction (chemical, alcohol or substance dependence). Talk to your care team about ways to reduce this risk.    All opioids tend to cause constipation. Drink plenty of water and eat foods that have a lot of fiber, such as fruits, vegetables, prune juice, apple juice and high-fiber cereal. Take a laxative (Miralax, milk of magnesia, Colace, Senna) if you don t move your bowels at least every other day. Other side effects include upset stomach, sleepiness, dizziness, throwing up, tolerance (needing more of the medicine to have the same effect), physical dependence and slowed breathing.    Store your pills in a secure place, locked if possible. We will not replace any lost or stolen medicine. If you don t finish your medicine, please throw away (dispose) as directed by your pharmacist. The [place] Pollution Control [employer] has more information about safe disposal: [email or web address].[email or web address]/living-[email or web address]-medications         Primary Care Provider Office Phone # Fax #    Lana Titi Peguero [phone] [address] [facility] [address]        Equal Access to Services     DANIELLA FUNG : Hadii valerie morgano Sokarishma, waaxda luqadaha, qaybta kaalmada danya, bacilio mijares . So [facility] [phone].    ATENCIÓN: Si habla español, tiene a ulrich disposición servicios gratuitos de asistencia lingüística. Llame al [phone].    We comply with applicable federal civil rights laws and [place] laws. We do not discriminate on the basis of race, color, national origin, age, disability, sex, sexual  orientation, or gender identity.            Thank you!     Thank you for choosing [facility]  for your care. Our goal is always to provide you with excellent care. Hearing back from our patients is one way we can continue to improve our services. Please take a few minutes to complete the written survey that you may receive in the mail after your visit with us. Thank you!             Your Updated Medication List - Protect others around you: Learn how to safely use, store and throw away your medicines at www.disposemymeds.org.          This list is accurate as of 10/17/18 11:59 PM.  Always use your most recent med list.                   Brand Name Dispense Instructions for use Diagnosis    ACE/ARB/ARNI NOT PRESCRIBED (INTENTIONAL)      Please choose reason not prescribed, below    Type 2 diabetes mellitus with diabetic polyneuropathy, without long-term current use of insulin (H)       amLODIPine 10 MG tablet    NORVASC    90 tablet    Take 1 tablet (10 mg) by mouth daily    Essential hypertension with goal blood pressure less than 140/90       * REGINE CONTOUR test strip   Generic drug:  blood glucose monitoring     300 strip    USE TO TEST THREE TIMES DAILY OR AS DIRECTED    Type 2 diabetes mellitus with diabetic polyneuropathy (H)       * REGINE CONTOUR test strip   Generic drug:  blood glucose monitoring     300 strip    USE TO TEST THREE TIMES DAILY OR AS DIRECTED    Type 2 diabetes mellitus with diabetic polyneuropathy (H)       docusate sodium 100 MG capsule    COLACE    30 capsule    Take 1 capsule by mouth 2 times daily as needed for constipation.    Constipation       Fluocinonide 0.1 % cream    LIDEX    60 g    Externally apply topically 2 times daily as needed    Dermatitis       fluticasone 50 MCG/ACT spray    FLONASE    16 g    SPRAY 1-2 SPRAYS INTO BOTH NOSTRILS DAILY    Chronic rhinitis       glimepiride 4 MG tablet    AMARYL    60 tablet    TAKE 1 TABLET(4 MG) BY MOUTH TWICE DAILY     Type 2 diabetes mellitus with diabetic polyneuropathy, without long-term current use of insulin (H)       HYDROcodone-acetaminophen 5-325 MG per tablet   Start taking on:  10/31/2018    NORCO    60 tablet    TAKE 1 TO 2 TABLETS BY MOUTH 3 TIMES DAILY AS NEEDED FOR MODERATE TO SVERE PAIN    Diabetic polyneuropathy associated with type 2 diabetes mellitus (H)       metFORMIN 500 MG tablet    GLUCOPHAGE    120 tablet    TAKE 2 TABLETS(1000 MG) BY MOUTH TWICE DAILY WITH MEALS    Type 2 diabetes mellitus with diabetic polyneuropathy, without long-term current use of insulin (H)       omeprazole 20 MG CR capsule    priLOSEC    30 capsule    TAKE 1 CAPSULE(20 MG) BY MOUTH DAILY 30 TO 60 MINUTES BEFORE THE EVENING MEAL    Gastroesophageal reflux disease without esophagitis       order for DME     200 Units    Equipment being ordered: Test strips - accu-check smartview Tests twice daily due to uncontrolled sugar    Type 2 diabetes mellitus with diabetic polyneuropathy, without long-term current use of insulin (H)       pioglitazone 45 MG tablet    ACTOS    30 tablet    Take 1 tablet (45 mg) by mouth daily    Type 2 diabetes mellitus with diabetic polyneuropathy, without long-term current use of insulin (H)       pregabalin 100 MG capsule    LYRICA    60 capsule    Take 1 capsule (100 mg) by mouth 2 times daily    Diabetic polyneuropathy associated with type 2 diabetes mellitus (H)       ramelteon 8 MG tablet    ROZEREM    30 tablet    Take 1 tablet (8 mg) by mouth At Bedtime    Chronic insomnia       simvastatin 20 MG tablet    ZOCOR    30 tablet    TAKE 1 TABLET(20 MG) BY MOUTH AT BEDTIME    Hyperlipidemia LDL goal <100       sitagliptin 50 MG tablet    JANUVIA    30 tablet    Take 1 tablet (50 mg) by mouth daily    Type 2 diabetes mellitus with diabetic polyneuropathy, without long-term current use of insulin (H)       triamcinolone 0.1 % cream    KENALOG    80 g    APPLY EXTERNALLY TO THE AFFECTED AREA TWICE DAILY AS  NEEDED FOR IRRITATION    Dermatitis       * Notice:  This list has 2 medication(s) that are the same as other medications prescribed for you. Read the directions carefully, and ask your doctor or other care provider to review them with you.

## 2018-10-17 NOTE — PROGRESS NOTES
"  SUBJECTIVE:   Enrique Pruitt is a 73 year old female who presents to clinic today for the following health issues:    Patient presents today to follow-up lab results.    Diabetes Follow-up    Patient is checking blood sugars: once daily.  Results are as follows:         -80    Diabetic concerns: low blood sugar      Symptoms of hypoglycemia (low blood sugar): none     Paresthesias (numbness or burning in feet) or sores: Yes      Date of last diabetic eye exam: Unsure    BP Readings from Last 2 Encounters:   09/10/18 100/62   05/15/18 110/66     Hemoglobin A1C (%)   Date Value   10/15/2018 7.8 (H)   09/10/2018 8.2 (H)     LDL Cholesterol Calculated (mg/dL)   Date Value   09/10/2018 125 (H)   05/03/2017 115 (H)     LDL Cholesterol Direct (mg/dL)   Date Value   12/19/2017 155 (H)       Diabetes Management Resources    Amount of exercise or physical activity: 1-2 day/week for an average of 15-30 minutes    Problems taking medications regularly: No    Medication side effects: none    Diet: regular (no restrictions)    SUBJECTIVE:  Seen today with her daughter who does some of the interpretation in follow-up of hypertension and kidney function.  When I saw her last month for diabetes recheck her creatinine was significantly higher and GFR lower.  I had the patient stop the benazepril portion of her Lotrel and we stuck with just amlodipine.  Reviewed lab work just performed.  Patient says she is feeling fine.    Review of systems otherwise negative.  Past medical, family, and social history reviewed and updated in chart.    OBJECTIVE:  /58 (BP Location: Right arm, Patient Position: Chair, Cuff Size: Adult Regular)  Pulse 105  Temp 97.8  F (36.6  C) (Oral)  Ht 1.448 m (4' 9\")  Wt 75.3 kg (166 lb)  LMP  (LMP Unknown)  SpO2 98%  Breastfeeding? No  BMI 35.92 kg/m2  Alert, pleasant, upbeat, and in no apparent discomfort.  S1 and S2 normal, no murmurs, clicks, gallops or rubs. Regular rate and rhythm. Chest " is clear; no wheezes or rales. No edema or JVD.   Past labs reviewed with the patient.     ASSESSMENT / PLAN:  (R79.89) Elevated serum creatinine  (primary encounter diagnosis)  Comment: Back to baseline off of benazepril.  Blood pressure still controlled.  We will continue to follow  Plan:     (E11.42) Type 2 diabetes mellitus with diabetic polyneuropathy, without long-term current use of insulin (H)  Comment:   Plan: ACE/ARB/ARNI NOT PRESCRIBED, INTENTIONAL,        Updated med list    (I10) Hypertension goal BP (blood pressure) < 140/90  Comment: Controlled on her current regimen  Plan:     (E11.42) Diabetic polyneuropathy associated with type 2 diabetes mellitus  Comment:   Plan: HYDROcodone-acetaminophen (NORCO) 5-325 MG per         tablet            (Z23) Need for prophylactic vaccination and inoculation against influenza  Comment: Advised but declines  Plan:     (Z23) Need for prophylactic vaccination against Streptococcus pneumoniae (pneumococcus)  Comment: Patient willing to have a pneumonia shot  Plan: VACCINE ADMINISTRATION, INITIAL, Pneumococcal         vaccine 13 valent PCV13 IM (Prevnar) [86296]            (Z23) Need for prophylactic vaccination with tetanus-diphtheria (Td)  Comment: Advised but declines  Plan:     Follow up 6 months   TIMMY Peguero MD    (Chart documentation completed in part with Dragon voice-recognition software.  Even though reviewed some grammatical, spelling, and word errors may remain.)

## 2018-11-06 DIAGNOSIS — K21.9 GASTROESOPHAGEAL REFLUX DISEASE WITHOUT ESOPHAGITIS: ICD-10-CM

## 2018-11-06 NOTE — TELEPHONE ENCOUNTER
"Requested Prescriptions   Pending Prescriptions Disp Refills     omeprazole (PRILOSEC) 20 MG CR capsule [Pharmacy Med Name: OMEPRAZOLE 20MG CAPSULES]  Last Written Prescription Date:  08/02/18  Last Fill Quantity: 30 capsule,  # refills: 2   Last office visit: 10/17/2018 with prescribing provider:  Dr. Peguero   Future Office Visit:   30 capsule 0     Sig: TAKE 1 CAPSULE(20 MG) BY MOUTH DAILY 30 TO 60 MINUTES BEFORE THE EVENING MEAL    PPI Protocol Passed    11/6/2018  3:39 AM       Passed - Not on Clopidogrel (unless Pantoprazole ordered)       Passed - No diagnosis of osteoporosis on record       Passed - Recent (12 mo) or future (30 days) visit within the authorizing provider's specialty    Patient had office visit in the last 12 months or has a visit in the next 30 days with authorizing provider or within the authorizing provider's specialty.  See \"Patient Info\" tab in inbasket, or \"Choose Columns\" in Meds & Orders section of the refill encounter.             Passed - Patient is age 18 or older       Passed - No active pregnacy on record       Passed - No positive pregnancy test in past 12 months          "

## 2018-11-08 NOTE — TELEPHONE ENCOUNTER
Prescription approved per [facility] Refill Protocol.      Miki Nova RN, BSN

## 2018-12-13 DIAGNOSIS — I10 ESSENTIAL HYPERTENSION WITH GOAL BLOOD PRESSURE LESS THAN 140/90: ICD-10-CM

## 2018-12-13 NOTE — TELEPHONE ENCOUNTER
"Requested Prescriptions   Pending Prescriptions Disp Refills     amLODIPine (NORVASC) 10 MG tablet 90 tablet 0     Sig: Take 1 tablet (10 mg) by mouth daily    Calcium Channel Blockers Protocol  Failed - 12/13/2018  1:48 PM       Failed - Normal serum creatinine on file in past 12 months    Recent Labs   Lab Test 10/15/18  1316   CR 1.17*            Passed - Blood pressure under 140/90 in past 12 months    BP Readings from Last 3 Encounters:   10/17/18 102/58   09/10/18 100/62   05/15/18 110/66                Passed - Recent (12 mo) or future (30 days) visit within the authorizing provider's specialty    Patient had office visit in the last 12 months or has a visit in the next 30 days with authorizing provider or within the authorizing provider's specialty.  See \"Patient Info\" tab in inbasket, or \"Choose Columns\" in Meds & Orders section of the refill encounter.             Passed - Patient is age 18 or older       Passed - No active pregnancy on record       Passed - No positive pregnancy test in past 12 months        amLODIPine (NORVASC) 10 MG tablet  Last Written Prescription Date:  9/16/18  Last Fill Quantity: 90,  # refills: 0   Last office visit: 10/17/2018 with prescribing provider:  Dr. Peguero   Future Office Visit:      "

## 2018-12-17 RX ORDER — AMLODIPINE BESYLATE 10 MG/1
10 TABLET ORAL DAILY
Qty: 90 TABLET | Refills: 0 | Status: SHIPPED | OUTPATIENT
Start: 2018-12-17 | End: 2019-03-26

## 2018-12-17 NOTE — TELEPHONE ENCOUNTER
Routing refill request to provider for review/approval because:      Calcium Channel Blockers Protocol Failed for not having following:   Normal serum creatinine on file in past 12 months     Lyssa Singh RN

## 2018-12-21 ENCOUNTER — PATIENT OUTREACH (OUTPATIENT)
Dept: GERIATRIC MEDICINE | Facility: CLINIC | Age: 73
End: 2018-12-21

## 2018-12-21 NOTE — PROGRESS NOTES
Internal Care Coordination Transfer Eff 1/1/19.  Mailed Letter.    Brittney Lee  Care Management Specialist  [facility]  [phone]

## 2018-12-21 NOTE — LETTER
December 21, 2018    NELDADARNELL ANGUIANOPRERNA  [address] [address]    Dear Enrique,     As a member of [facility] ([facility]) ([facility]), you are assigned a case coordinator.  I will be your new case coordinator as of 1/1/19 because:    [] Your address has changed.  [] You have selected a new primary care clinic/physician.  [] Your previous case coordinator has left the agency.   [x] You have been reassigned.    If you have any questions, please feel free to call me at  [phone]. If you reach my voice mail, please leave a message and your phone number. If you are hearing impaired, please call the [place] Relay at 418 or [phone] (oxgewi-cu-foisli relay service).     I look forward to speaking with you soon.    Sincerely,        Regine Stapleton RN, PHN    [facility] is a health plan that contracts with both Medicare and the [place] Medical Assistance (Medicaid) program to provide benefits of both programs to enrollees. Enrollment in [facility] depends on contract renewal.    [facility]  [id number] DHS Approved ([id number])          (01/17)

## 2018-12-24 ENCOUNTER — PATIENT OUTREACH (OUTPATIENT)
Dept: GERIATRIC MEDICINE | Facility: CLINIC | Age: 73
End: 2018-12-24

## 2018-12-24 NOTE — PROGRESS NOTES
[facility] Care Coordination Contact      [facility] Six-Month Telephone Assessment    6 month telephone assessment completed on 12/24/18 with Oudomphone (Daughter).    ER visits: No  Hospitalizations: No  TCU stays: No  Significant health status changes: None  Falls/Injuries: No  ADL/IADL changes: No  Changes in services: No, current POC working well.    Caregiver Assessment follow up:  N/A    Goals: See POC in chart for goal progress documentation.      Will see member in 6 months for an annual health risk assessment.   Encouraged member to call CC with any questions or concerns in the meantime.     Client is transferred to [facility]  Regine Stapleton effective 1/1/19. Transfer letter mailed. Chart reviewed, notes printed and this CM's encounter closed.    Crystal Parson RN  [facility]  [phone]

## 2018-12-31 DIAGNOSIS — K21.9 GASTROESOPHAGEAL REFLUX DISEASE WITHOUT ESOPHAGITIS: ICD-10-CM

## 2018-12-31 NOTE — TELEPHONE ENCOUNTER
"Requested Prescriptions   Pending Prescriptions Disp Refills     omeprazole (PRILOSEC) 20 MG DR capsule [Pharmacy Med Name: OMEPRAZOLE 20MG CAPSULES] 30 capsule 0     Sig: TAKE 1 CAPSULE(20 MG) BY MOUTH DAILY 30 TO 60 MINUTES BEFORE THE EVENING MEAL    PPI Protocol Passed - 12/31/2018  3:39 AM       Passed - Not on Clopidogrel (unless Pantoprazole ordered)       Passed - No diagnosis of osteoporosis on record       Passed - Recent (12 mo) or future (30 days) visit within the authorizing provider's specialty    Patient had office visit in the last 12 months or has a visit in the next 30 days with authorizing provider or within the authorizing provider's specialty.  See \"Patient Info\" tab in inbasket, or \"Choose Columns\" in Meds & Orders section of the refill encounter.             Passed - Patient is age 18 or older       Passed - No active pregnacy on record       Passed - No positive pregnancy test in past 12 months        omeprazole (PRILOSEC) 20 MG CR capsule  Last Written Prescription Date:  11/8/18  Last Fill Quantity: 30,  # refills: 0   Last office visit: 10/17/2018 with prescribing provider:  Dr. Lovelace   Future Office Visit:      "

## 2019-01-04 DIAGNOSIS — E11.42 DIABETIC POLYNEUROPATHY ASSOCIATED WITH TYPE 2 DIABETES MELLITUS (H): ICD-10-CM

## 2019-01-04 NOTE — TELEPHONE ENCOUNTER
Requested Prescriptions   Pending Prescriptions Disp Refills     HYDROcodone-acetaminophen (NORCO) 5-325 MG tablet 60 tablet 0     Sig: TAKE 1 TO 2 TABLETS BY MOUTH 3 TIMES DAILY AS NEEDED FOR MODERATE TO SVERE PAIN  Last Written Prescription Date:  10/31/18  Last Fill Quantity: 60 tablet,  # refills: 0   Last office visit: 10/17/2018 with prescribing provider:  Dr. Peguero   Future Office Visit:      Routing refill request to provider for review/approval because:  Drug not on the [facility] or [facility] refill protocol or controlled substance      There is no refill protocol information for this order

## 2019-01-04 NOTE — TELEPHONE ENCOUNTER
Reason for Call:   prescription    Detailed comments: Patient's daughter called said the patient need a refill on NORCO     Phone Number Patient can be reached at: Home number on file [phone] (home)    Best Time: ANYTIME    Can we leave a detailed message on this number? YES    Call taken on 1/4/2019 at 11:02 AM by Roque Sanchez

## 2019-01-07 DIAGNOSIS — L30.9 DERMATITIS: ICD-10-CM

## 2019-01-07 RX ORDER — HYDROCODONE BITARTRATE AND ACETAMINOPHEN 5; 325 MG/1; MG/1
TABLET ORAL
Qty: 60 TABLET | Refills: 0 | Status: SHIPPED | OUTPATIENT
Start: 2019-01-07 | End: 2019-03-05

## 2019-01-07 NOTE — TELEPHONE ENCOUNTER
"Requested Prescriptions   Pending Prescriptions Disp Refills     triamcinolone (KENALOG) 0.1 % external cream [Pharmacy Med Name: TRIAMCINOLONE 0.1% CREAM 80GM] 80 g 0     Sig: APPLY EXTERNALLY TO THE AFFECTED AREA TWICE DAILY AS NEEDED FOR IRRITATION    Topical Steroids and Nonsteroidals Protocol Passed - 1/7/2019  3:38 AM       Passed - Patient is age 6 or older       Passed - Authorizing prescriber's most recent note related to this medication read.    If refill request is for ophthalmic use, please forward request to provider for approval.         Passed - High potency steroid not ordered       Passed - Recent (12 mo) or future (30 days) visit within the authorizing provider's specialty    Patient had office visit in the last 12 months or has a visit in the next 30 days with authorizing provider or within the authorizing provider's specialty.  See \"Patient Info\" tab in inbasket, or \"Choose Columns\" in Meds & Orders section of the refill encounter.             Passed - Medication is active on med list        triamcinolone (KENALOG) 0.1 % cream  Last Written Prescription Date:  5/25/18  Last Fill Quantity: 80g,  # refills: 0   Last office visit: 10/17/2018 with prescribing provider:  Dr. Peguero   Future Office Visit:      "

## 2019-01-09 ENCOUNTER — APPOINTMENT (OUTPATIENT)
Dept: OPTOMETRY | Facility: CLINIC | Age: 74
End: 2019-01-09
Payer: COMMERCIAL

## 2019-01-09 ENCOUNTER — OFFICE VISIT (OUTPATIENT)
Dept: OPTOMETRY | Facility: CLINIC | Age: 74
End: 2019-01-09
Payer: COMMERCIAL

## 2019-01-09 DIAGNOSIS — H35.373 EPIRETINAL MEMBRANE (ERM) OF BOTH EYES: ICD-10-CM

## 2019-01-09 DIAGNOSIS — Z96.1 PSEUDOPHAKIA: ICD-10-CM

## 2019-01-09 DIAGNOSIS — H04.123 DRY EYES: ICD-10-CM

## 2019-01-09 DIAGNOSIS — H52.4 ASTIGMATISM OF BOTH EYES WITH PRESBYOPIA: Primary | ICD-10-CM

## 2019-01-09 DIAGNOSIS — H52.203 ASTIGMATISM OF BOTH EYES WITH PRESBYOPIA: Primary | ICD-10-CM

## 2019-01-09 DIAGNOSIS — E11.9 DM TYPE 2 WITHOUT RETINOPATHY (H): ICD-10-CM

## 2019-01-09 PROCEDURE — 92341 FIT SPECTACLES BIFOCAL: CPT | Performed by: OPTOMETRIST

## 2019-01-09 PROCEDURE — 92015 DETERMINE REFRACTIVE STATE: CPT | Performed by: OPTOMETRIST

## 2019-01-09 PROCEDURE — 92014 COMPRE OPH EXAM EST PT 1/>: CPT | Performed by: OPTOMETRIST

## 2019-01-09 RX ORDER — POLYVINYL ALCOHOL 14 MG/ML
1 SOLUTION/ DROPS OPHTHALMIC 4 TIMES DAILY
Qty: 6 ML | Refills: 12 | Status: SHIPPED | OUTPATIENT
Start: 2019-01-09 | End: 2020-01-21

## 2019-01-09 RX ORDER — TRIAMCINOLONE ACETONIDE 1 MG/G
CREAM TOPICAL
Qty: 80 G | Refills: 2 | Status: SHIPPED | OUTPATIENT
Start: 2019-01-09 | End: 2020-02-11

## 2019-01-09 ASSESSMENT — REFRACTION_WEARINGRX
OS_CYLINDER: +1.50
OS_AXIS: 180
OD_SPHERE: +0.75
OS_ADD: +2.50
SPECS_TYPE: BIFOCAL
OS_SPHERE: -0.75
OD_CYLINDER: +1.50
OD_AXIS: 002
OD_ADD: +2.50

## 2019-01-09 ASSESSMENT — VISUAL ACUITY
OS_CC: 20/40
OD_CC: 20/40
OS_CC: 20/60
OD_SC: 20/150
CORRECTION_TYPE: GLASSES
OD_CC: 20/60
OS_SC: 20/100
METHOD: SNELLEN - LINEAR

## 2019-01-09 ASSESSMENT — CUP TO DISC RATIO
OD_RATIO: 0.45
OS_RATIO: 0.45

## 2019-01-09 ASSESSMENT — REFRACTION_MANIFEST
OS_AXIS: 169
METHOD_AUTOREFRACTION: 1
OS_AXIS: 170
OS_ADD: +2.50
OD_CYLINDER: +1.50
OS_SPHERE: -1.25
OD_SPHERE: +0.25
OS_CYLINDER: +2.50
OS_SPHERE: -1.00
OS_CYLINDER: +2.50
OD_AXIS: 003
OD_CYLINDER: +1.50
OD_SPHERE: +0.25
OD_AXIS: 012
OD_ADD: +2.50

## 2019-01-09 ASSESSMENT — EXTERNAL EXAM - LEFT EYE: OS_EXAM: NORMAL

## 2019-01-09 ASSESSMENT — CONF VISUAL FIELD
OS_NORMAL: 1
OD_NORMAL: 1

## 2019-01-09 ASSESSMENT — TONOMETRY
IOP_METHOD: APPLANATION
OD_IOP_MMHG: 19
OS_IOP_MMHG: 20

## 2019-01-09 ASSESSMENT — EXTERNAL EXAM - RIGHT EYE: OD_EXAM: NORMAL

## 2019-01-09 ASSESSMENT — SLIT LAMP EXAM - LIDS
COMMENTS: NORMAL
COMMENTS: NORMAL

## 2019-01-09 NOTE — LETTER
1/9/2019         RE: Enrique Pruitt  [address]  [place] [address]        Dear Colleague,    Thank you for referring your patient, Enrique Pruitt, to the [facility]. Please see a copy of my visit note below.    Chief Complaint   Patient presents with     Diabetic Eye Exam     Accompanied by daughter who is interpreting  Lab Results   Component Value Date    A1C 7.8 10/15/2018    A1C 8.2 09/10/2018    A1C 6.8 04/27/2018    A1C 8.4 12/19/2017    A1C 6.9 05/03/2017       Last Eye Exam: August 2016  Dilated Previously: Yes    What are you currently using to see?  glasses    Distance Vision Acuity: Noticed gradual change in both eyes    Near Vision Acuity: Not satisfied     Eye Comfort: watery  Do you use eye drops? : No  Occupation or Hobbies: retired    Estella Ruiz [facility]     Medical, surgical and family histories reviewed and updated 1/9/2019.       OBJECTIVE: See Ophthalmology exam    ASSESSMENT:    ICD-10-CM    1. Astigmatism of both eyes with presbyopia H52.203 EYE EXAM (SIMPLE-NONBILLABLE)    H52.4 REFRACTION   2. Pseudophakia Z96.1 EYE EXAM (SIMPLE-NONBILLABLE)   3. Dry eyes H04.123 EYE EXAM (SIMPLE-NONBILLABLE)     polyvinyl alcohol (ARTIFICIAL TEARS) 1.4 % ophthalmic solution   4. DM type 2 without retinopathy (H) E11.9    5. Epiretinal membrane (ERM) of both eyes H35.373 VITREORETINAL SURGERY REFERRAL      PLAN:    Enrique Pruitt aware  eye exam results will be sent to Lana Peguero.  Patient Instructions   There was no diabetic eye disease in either eye today. Keep blood sugar levels under good control and return yearly for eye exams.     Patient Education   Diabetes weakens the blood vessels all over the body, including the eyes. Damage to the blood vessels in the eyes can cause swelling or bleeding into part of the eye (called the retina). This is called diabetic retinopathy (ELLA-tin-AH-puh-thee). If not treated, this disease can cause vision loss  or blindness.   Symptoms may include blurred or distorted vision, but many people have no symptoms. It's important to see your eye doctor regularly to check for problems.   Early treatment and good control can help protect your vision. Here are the things you can do to help prevent vision loss:      1. Keep your blood sugar levels under tight control.      2. Bring high blood pressure under control.      3. No smoking.      4. Have yearly dilated eye exams.     Referral given to S for epiretinal membrane. Enrique did not want to keep the referral but her daughter took the referral sheet.           Again, thank you for allowing me to participate in the care of your patient.        Sincerely,        Beth Borrego OD

## 2019-01-09 NOTE — PROGRESS NOTES
Chief Complaint   Patient presents with     Diabetic Eye Exam     Accompanied by daughter who is interpreting  Lab Results   Component Value Date    A1C 7.8 10/15/2018    A1C 8.2 09/10/2018    A1C 6.8 04/27/2018    A1C 8.4 12/19/2017    A1C 6.9 05/03/2017       Last Eye Exam: August 2016  Dilated Previously: Yes    What are you currently using to see?  glasses    Distance Vision Acuity: Noticed gradual change in both eyes    Near Vision Acuity: Not satisfied     Eye Comfort: watery  Do you use eye drops? : No  Occupation or Hobbies: retired    [facility]     Medical, surgical and family histories reviewed and updated 1/9/2019.       OBJECTIVE: See Ophthalmology exam    ASSESSMENT:    ICD-10-CM    1. Astigmatism of both eyes with presbyopia H52.203 EYE EXAM (SIMPLE-NONBILLABLE)    H52.4 REFRACTION   2. Pseudophakia Z96.1 EYE EXAM (SIMPLE-NONBILLABLE)   3. Dry eyes H04.123 EYE EXAM (SIMPLE-NONBILLABLE)     polyvinyl alcohol (ARTIFICIAL TEARS) 1.4 % ophthalmic solution   4. DM type 2 without retinopathy (H) E11.9    5. Epiretinal membrane (ERM) of both eyes H35.373 VITREORETINAL SURGERY REFERRAL      PLAN:    Enrique Pruitt aware  eye exam results will be sent to Lana Peguero.  Patient Instructions   There was no diabetic eye disease in either eye today. Keep blood sugar levels under good control and return yearly for eye exams.     Patient Education   Diabetes weakens the blood vessels all over the body, including the eyes. Damage to the blood vessels in the eyes can cause swelling or bleeding into part of the eye (called the retina). This is called diabetic retinopathy (ELLA-tin-AH-puh-thee). If not treated, this disease can cause vision loss or blindness.   Symptoms may include blurred or distorted vision, but many people have no symptoms. It's important to see your eye doctor regularly to check for problems.   Early treatment and good control can help protect your vision. Here are the things  you can do to help prevent vision loss:      1. Keep your blood sugar levels under tight control.      2. Bring high blood pressure under control.      3. No smoking.      4. Have yearly dilated eye exams.     Referral given to [facility] for epiretinal membrane. Enrique did not want to keep the referral but her daughter took the referral sheet.

## 2019-01-09 NOTE — TELEPHONE ENCOUNTER
Routing refill request to provider for review/approval because:  Not discussed in progress notes from OV's in the last year per [facility] policy:   Refer to last progress note, if plan and follow-up noted, can refill      Zaida Nunez RN

## 2019-01-10 NOTE — PATIENT INSTRUCTIONS
There was no diabetic eye disease in either eye today. Keep blood sugar levels under good control and return yearly for eye exams.     Patient Education   Diabetes weakens the blood vessels all over the body, including the eyes. Damage to the blood vessels in the eyes can cause swelling or bleeding into part of the eye (called the retina). This is called diabetic retinopathy (ELLA-tin-AH-puh-thee). If not treated, this disease can cause vision loss or blindness.   Symptoms may include blurred or distorted vision, but many people have no symptoms. It's important to see your eye doctor regularly to check for problems.   Early treatment and good control can help protect your vision. Here are the things you can do to help prevent vision loss:      1. Keep your blood sugar levels under tight control.      2. Bring high blood pressure under control.      3. No smoking.      4. Have yearly dilated eye exams.     Referral given to [facility] for epiretinal membrane. Enrique did not want to keep the referral but her daughter took the referral sheet.

## 2019-02-08 DIAGNOSIS — E11.42 TYPE 2 DIABETES MELLITUS WITH DIABETIC POLYNEUROPATHY (H): ICD-10-CM

## 2019-02-08 NOTE — TELEPHONE ENCOUNTER
"Requested Prescriptions   Pending Prescriptions Disp Refills     REGINE CONTOUR test strip [Pharmacy Med Name: CONTOUR TEST STRIPS 100'S]  Last Written Prescription Date:  7/9/18  Last Fill Quantity: 300 STRIP,  # refills: 0   Last office visit: 10/17/2018 with prescribing provider:  Dr. Peguero   Future Office Visit:     300 strip 0     Sig: USE TO TEST THREE TIMES DAILY OR AS DIRECTED    Diabetic Supplies Protocol Passed - 2/8/2019  9:36 AM       Passed - Medication is active on med list       Passed - Patient is 18 years of age or older       Passed - Recent (6 mo) or future (30 days) visit within the authorizing provider's specialty    Patient had office visit in the last 6 months or has a visit in the next 30 days with authorizing provider.  See \"Patient Info\" tab in inbasket, or \"Choose Columns\" in Meds & Orders section of the refill encounter.              "

## 2019-02-11 NOTE — TELEPHONE ENCOUNTER
Prescription approved per [facility] Refill Protocol.      Miki Nova RN, BSN

## 2019-03-05 DIAGNOSIS — G89.4 CHRONIC PAIN SYNDROME: ICD-10-CM

## 2019-03-05 DIAGNOSIS — E11.42 DIABETIC POLYNEUROPATHY ASSOCIATED WITH TYPE 2 DIABETES MELLITUS (H): ICD-10-CM

## 2019-03-05 RX ORDER — HYDROCODONE BITARTRATE AND ACETAMINOPHEN 5; 325 MG/1; MG/1
TABLET ORAL
Qty: 60 TABLET | Refills: 0 | Status: SHIPPED | OUTPATIENT
Start: 2019-03-05 | End: 2019-05-17

## 2019-03-05 NOTE — TELEPHONE ENCOUNTER
This writer attempted to contact pt on 03/05/19      Reason for call rx /schedule diab appt and left message.      If patient calls back:   Schedule Office Visit appointment for Diabetes f/u at her convenience with PCP. Inform rx at front for , document that pt called and close encounter         Aliya Reyes CMA  pt

## 2019-03-05 NOTE — TELEPHONE ENCOUNTER
Controlled Substance Refill Request for Norco  Problem List Complete:  Yes  Problem Detail     Noted:  12/14/2016    Priority:  Medium    Overview Addendum 3/5/2019 12:16 PM by Miki Nova RN   Patient is followed by Lana Peguero MD for ongoing prescription of pain medication.  All refills should be approved by this provider, or covering partner.     Medication(s): vicodin 5.   Maximum quantity per month: 60 = < 90 MED  Narcan n/a  Clinic visit frequency required: Q 6  months      Controlled substance agreement:  Encounter-Level CSA - 04/25/2017:            Controlled Substance Agreement - Scan on 5/2/2017  8:46 AM : CONTROLLED SUBSTANCE AGREEMENT (below)                   Pain Clinic evaluation in the past: No     DIRE Total Score(s):  No flowsheet data found.     Last [facility] website verification: 03/05/19    [email or web address]       checked in past 3 months?  Yes 3/4/2019   Last Written Prescription Date:  1/10/19  Last Fill Quantity: 60 tablets  # refills: 0   Last office visit: 10/17/2018 with prescribing provider:  10/17/18   Future Office Visit:  None  RX [place] reviewed:  reviewed- no concerns    [place] profile:  [email or web address]            Miki Nova RN, BSN

## 2019-03-05 NOTE — TELEPHONE ENCOUNTER
Reason for Call:  Other prescription    Detailed comments: asking for hydrocodone to be picked up at  at .    Phone Number Patient can be reached at: Other phone number:  [phone] (D)    Best Time: any    Can we leave a detailed message on this number? YES    Call taken on 3/5/2019 at 9:18 AM by Jolly Thomas

## 2019-03-05 NOTE — TELEPHONE ENCOUNTER
Requested Prescriptions   Pending Prescriptions Disp Refills     HYDROcodone-acetaminophen (NORCO) 5-325 MG tablet  Last Written Prescription Date:  1/7/19  Last Fill Quantity: 60 tablet,  # refills: 0   Last office visit: 10/17/2018 with prescribing provider:  Dr. Peguero   Future Office Visit:      Routing refill request to provider for review/approval because:  Drug not on the [facility], [facility] or  Health refill protocol or controlled substance   60 tablet 0     Sig: TAKE 1 TO 2 TABLETS BY MOUTH 3 TIMES DAILY AS NEEDED FOR MODERATE TO SVERE PAIN    There is no refill protocol information for this order

## 2019-03-06 NOTE — TELEPHONE ENCOUNTER
Patient's daughter returned call    Best number to reach caller: Home number on file [phone] (home)    Is it ok to leave a detailed message: yes  *will schedule DIABETES visit at later time

## 2019-03-25 DIAGNOSIS — I10 ESSENTIAL HYPERTENSION WITH GOAL BLOOD PRESSURE LESS THAN 140/90: ICD-10-CM

## 2019-03-26 RX ORDER — AMLODIPINE BESYLATE 10 MG/1
10 TABLET ORAL DAILY
Qty: 30 TABLET | Refills: 0 | Status: SHIPPED | OUTPATIENT
Start: 2019-03-26 | End: 2019-05-17

## 2019-03-26 NOTE — TELEPHONE ENCOUNTER
This writer attempted to contact pt on 03/26/19      Reason for call schedule OV for diabetes check and labs. 30 day of med was sent to pharmacy and left message.      If patient calls back:   Schedule Office Visit appointment within 1 month with PCP & lab, document that pt called and close encounter         Lou Vega

## 2019-03-26 NOTE — TELEPHONE ENCOUNTER
"Requested Prescriptions   Pending Prescriptions Disp Refills     amLODIPine (NORVASC) 10 MG tablet  Last Written Prescription Date:  12/17/18  Last Fill Quantity: 90 tablet,  # refills: 0   Last office visit: 10/17/2018 with prescribing provider:  Dr. Peguero   Future Office Visit:     90 tablet 0     Sig: Take 1 tablet (10 mg) by mouth daily    Calcium Channel Blockers Protocol  Failed - 3/25/2019  4:37 PM       Failed - Normal serum creatinine on file in past 12 months    Recent Labs   Lab Test 10/15/18  1316   CR 1.17*            Passed - Blood pressure under 140/90 in past 12 months    BP Readings from Last 3 Encounters:   10/17/18 102/58   09/10/18 100/62   05/15/18 110/66                Passed - Recent (12 mo) or future (30 days) visit within the authorizing provider's specialty    Patient had office visit in the last 12 months or has a visit in the next 30 days with authorizing provider or within the authorizing provider's specialty.  See \"Patient Info\" tab in inbasket, or \"Choose Columns\" in Meds & Orders section of the refill encounter.             Passed - Medication is active on med list       Passed - Patient is age 18 or older       Passed - No active pregnancy on record       Passed - No positive pregnancy test in past 12 months          "

## 2019-03-26 NOTE — TELEPHONE ENCOUNTER
30-day supply sent.  Patient due for office visit and labs.  Clarisse Wiseman, KELBY, NP-C  [facility]

## 2019-03-26 NOTE — TELEPHONE ENCOUNTER
Routing refill request to provider for review/approval because:  Labs out of range:  Creatinine      Miki Nova RN, BSN

## 2019-03-31 DIAGNOSIS — E11.42 TYPE 2 DIABETES MELLITUS WITH DIABETIC POLYNEUROPATHY, WITHOUT LONG-TERM CURRENT USE OF INSULIN (H): ICD-10-CM

## 2019-04-01 NOTE — TELEPHONE ENCOUNTER
This writer attempted to contact pt on 04/01/19      Reason for call schedule OV and left message.      If patient calls back:   Schedule Office Visit appointment within 1 month with PCP, document that pt called and close encounter         Luba Paez MA

## 2019-04-01 NOTE — TELEPHONE ENCOUNTER
Requested Prescriptions   Pending Prescriptions Disp Refills     pioglitazone (ACTOS) 45 MG tablet [Pharmacy Med Name: PIOGLITAZONE 45MG TABLETS]  Last Written Prescription Date:  9/10/18  Last Fill Quantity: 30 tablet,  # refills: 5   Last office visit: 10/17/2018 with prescribing provider:  Dr. Peguero   Future Office Visit:     30 tablet 0     Sig: TAKE 1 TABLET BY MOUTH DAILY    Thiazolidinedione Agents (TZDs)  Failed - 3/31/2019  3:39 AM       Failed - Patient has a normal serum Creatinine in the past 12 months    Recent Labs   Lab Test 10/15/18  1316   CR 1.17*            Passed - Blood pressure less than 140/90 in past 6 months    BP Readings from Last 3 Encounters:   10/17/18 102/58   09/10/18 100/62   05/15/18 110/66                Passed - Patient has documented LDL within the past 12 mos.    Recent Labs   Lab Test 09/10/18  1725   *            Passed - Patient has a normal ALT within the past 12 mos.    Recent Labs   Lab Test 09/10/18  1725   ALT 15            Passed - Patient has a normal AST within the past 12 mos.     Recent Labs   Lab Test 09/10/18  1725   AST 14            Passed - Patient has had a Microalbumin in the past 12 mos.    Recent Labs   Lab Test 09/10/18  1725   MICROL 20   UMALCR 12.96            Passed - Patient has documented A1c within the specified period of time.    If HgbA1C is 8 or greater, it needs to be on file within the past 3 months.  If less than 8, must be on file within the past 6 months.     Recent Labs   Lab Test 10/15/18  1316   A1C 7.8*            Passed - Diagnosis not CHF       Passed - Medication is active on med list       Passed - Patient is age 18 or older       Passed - Patient is not pregnant       Passed - Patient has not had a positive pregnancy test within the past 12 mos.       Passed - Recent (6 mo) or future (30 days) visit within the authorizing provider's specialty    Patient had office visit in the last 6 months or has a visit in the next 30  "days with authorizing provider or within the authorizing provider's specialty.  See \"Patient Info\" tab in inbasket, or \"Choose Columns\" in Meds & Orders section of the refill encounter.              "

## 2019-04-01 NOTE — TELEPHONE ENCOUNTER
Routing refill request to provider for review/approval because:    Due to the following:   Patient has a normal serum Creatinine in the past 12 months     Lyssa Singh RN

## 2019-04-02 RX ORDER — PIOGLITAZONEHYDROCHLORIDE 45 MG/1
45 TABLET ORAL DAILY
Qty: 30 TABLET | Refills: 0 | Status: SHIPPED | OUTPATIENT
Start: 2019-04-02 | End: 2019-05-17

## 2019-04-03 DIAGNOSIS — E11.42 DIABETIC POLYNEUROPATHY ASSOCIATED WITH TYPE 2 DIABETES MELLITUS (H): ICD-10-CM

## 2019-04-03 NOTE — TELEPHONE ENCOUNTER
Routing refill request to provider for review/approval because:  Drug not on the [facility] refill protocol       Miki Nova RN, BSN

## 2019-04-03 NOTE — TELEPHONE ENCOUNTER
Requested Prescriptions   Pending Prescriptions Disp Refills     pregabalin (LYRICA) 100 MG capsule 60 capsule 5     Sig: Take 1 capsule (100 mg) by mouth 2 times daily    There is no refill protocol information for this order        pregabalin (LYRICA) 100 MG capsule  Last Written Prescription Date:  9/10/18  Last Fill Quantity: 60,  # refills: 5   Last office visit: 10/17/2018 with prescribing provider:  Dr. Peguero   Future Office Visit:

## 2019-04-04 RX ORDER — PREGABALIN 100 MG/1
100 CAPSULE ORAL 2 TIMES DAILY
Qty: 60 CAPSULE | Refills: 0 | Status: SHIPPED | OUTPATIENT
Start: 2019-04-04 | End: 2020-01-20

## 2019-04-04 NOTE — TELEPHONE ENCOUNTER
LM for pt to call clinic.  3rd attempt, with no response.  Please advise.      Luba CAR, Patient Care

## 2019-05-06 ENCOUNTER — PATIENT OUTREACH (OUTPATIENT)
Dept: GERIATRIC MEDICINE | Facility: CLINIC | Age: 74
End: 2019-05-06

## 2019-05-06 NOTE — PROGRESS NOTES
[facility] Care Coordination Contact    Called family [employer]UD[employer]ONE to schedule annual HRA home visit. HRA has been scheduled for 5/15/19 at 12:30pm..     A Yariel  from [facility] scheduled for home visit.     Regine Stapleton RN, PHN  [facility]  [phone]

## 2019-05-15 ENCOUNTER — PATIENT OUTREACH (OUTPATIENT)
Dept: GERIATRIC MEDICINE | Facility: CLINIC | Age: 74
End: 2019-05-15

## 2019-05-15 ASSESSMENT — PATIENT HEALTH QUESTIONNAIRE - PHQ9: SUM OF ALL RESPONSES TO PHQ QUESTIONS 1-9: 0

## 2019-05-15 ASSESSMENT — ACTIVITIES OF DAILY LIVING (ADL)
DEPENDENT_IADLS:: CLEANING;COOKING;LAUNDRY;SHOPPING;MEAL PREPARATION;MEDICATION MANAGEMENT;MONEY MANAGEMENT;TRANSPORTATION;INCONTINENCE

## 2019-05-15 NOTE — PROGRESS NOTES
[facility] Care Coordination Contact    [facility] Home Visit Assessment     Home visit for Health Risk Assessment with Enrique Pruitt completed on May 15, 2019    Type of residence:: Private home - stairs  Current living arrangement:: I live in a private home with family     Assessment completed with:: Patient, Care Team Member, Family    Current Care Plan  Member currently receiving the following home care services:  NA   Member currently receiving the following community resources: Lifeline referral and PCA service.       Medication Review  Medication reconciliation completed in Epic: Yes  Medication set-up & administration: Family/informal caregiver sets up weekly.  Family caregiver administers medications.  Medication Risk Assessment Medication (1 or more, place referral to MTM): N/A: No risk factors identified  MTM Referral Placed: No: No risk factors idenified    Mental/Behavioral Health   Depression Screening: See PHQ assessment flowsheet.   Mental health DX:: No      Mental Health Diagnosis: No  Mental Health Services: None: No further intervention needed at this time.    Falls Assessment:   Fallen 2 or more times in the past year?: No   Any fall with injury in the past year?: No    ADL/IADL Dependencies:   Dependent ADLs:: Ambulation-walker, Bathing, Dressing, Eating, Grooming, Incontinence, Positioning, Transfers, Wheelchair-with assist, Toileting  Dependent IADLs:: Cleaning, Cooking, Laundry, Shopping, Meal Preparation, Medication Management, Money Management, Transportation, Incontinence    [facility] sponsored benefits: Shared information re: Silver Sneakers/gym memberships, ASA, Calcium +D.    PCA Assessment completed at visit: Yes     Elderly Waiver Eligibility: Yes-will continue on EW    Care Plan & Recommendations: Member reports health is managed fair with support from family and PCA. Enrique reports to have chronic pain in JAXON LE and JAXON UE daily. Pain is managed with  medication, walking with assistance and rest. Enrique also reports chronic numbness and tingling sensation in bottom of feet and both hands. Numbness causes member to have difficulty feeling sensation on feet when walking and when holding items. No falls, ED or hospitalization within the past year. Family and/or PCA with member 24/7. Enrique will continue PCA service and like to have life line. CC will send referral for life line.     See [facility] for detailed assessment information.    Follow-Up Plan: Member informed of future contact, plan to f/u with member with a 6 month telephone assessment.  Contact information shared with member and family, encouraged member to call with any questions or concerns at any time.    [facility] care continuum providers: Please refer to Health Care Home on the Epic Problem List to view this patient's [facility] Care Plan Summary.      Regine Stapleton RN, PHN  [facility]  [phone]

## 2019-05-17 ENCOUNTER — OFFICE VISIT (OUTPATIENT)
Dept: FAMILY MEDICINE | Facility: CLINIC | Age: 74
End: 2019-05-17
Payer: COMMERCIAL

## 2019-05-17 VITALS
SYSTOLIC BLOOD PRESSURE: 104 MMHG | HEIGHT: 57 IN | TEMPERATURE: 98.6 F | BODY MASS INDEX: 31.5 KG/M2 | OXYGEN SATURATION: 98 % | HEART RATE: 107 BPM | DIASTOLIC BLOOD PRESSURE: 66 MMHG | WEIGHT: 146 LBS

## 2019-05-17 DIAGNOSIS — G89.4 CHRONIC PAIN SYNDROME: ICD-10-CM

## 2019-05-17 DIAGNOSIS — R05.9 COUGH: Primary | ICD-10-CM

## 2019-05-17 DIAGNOSIS — E78.5 HYPERLIPIDEMIA LDL GOAL <70: ICD-10-CM

## 2019-05-17 DIAGNOSIS — E11.42 TYPE 2 DIABETES MELLITUS WITH DIABETIC POLYNEUROPATHY, WITHOUT LONG-TERM CURRENT USE OF INSULIN (H): ICD-10-CM

## 2019-05-17 DIAGNOSIS — I10 ESSENTIAL HYPERTENSION WITH GOAL BLOOD PRESSURE LESS THAN 140/90: ICD-10-CM

## 2019-05-17 DIAGNOSIS — E11.42 DIABETIC POLYNEUROPATHY ASSOCIATED WITH TYPE 2 DIABETES MELLITUS (H): ICD-10-CM

## 2019-05-17 PROBLEM — E66.01 MORBID OBESITY (H): Status: RESOLVED | Noted: 2018-09-10 | Resolved: 2019-05-17

## 2019-05-17 LAB — HBA1C MFR BLD: 9.4 % (ref 0–5.6)

## 2019-05-17 PROCEDURE — 36415 COLL VENOUS BLD VENIPUNCTURE: CPT | Performed by: FAMILY MEDICINE

## 2019-05-17 PROCEDURE — 99214 OFFICE O/P EST MOD 30 MIN: CPT | Performed by: FAMILY MEDICINE

## 2019-05-17 PROCEDURE — 83036 HEMOGLOBIN GLYCOSYLATED A1C: CPT | Performed by: FAMILY MEDICINE

## 2019-05-17 PROCEDURE — 80306 DRUG TEST PRSMV INSTRMNT: CPT | Performed by: FAMILY MEDICINE

## 2019-05-17 PROCEDURE — 80053 COMPREHEN METABOLIC PANEL: CPT | Performed by: FAMILY MEDICINE

## 2019-05-17 RX ORDER — AMOXICILLIN 500 MG/1
500 CAPSULE ORAL 3 TIMES DAILY
Qty: 21 CAPSULE | Refills: 0 | Status: SHIPPED | OUTPATIENT
Start: 2019-05-17 | End: 2019-09-23

## 2019-05-17 RX ORDER — PIOGLITAZONEHYDROCHLORIDE 45 MG/1
45 TABLET ORAL DAILY
Qty: 30 TABLET | Refills: 5 | Status: SHIPPED | OUTPATIENT
Start: 2019-05-17 | End: 2019-09-23

## 2019-05-17 RX ORDER — SIMVASTATIN 20 MG
TABLET ORAL
Qty: 30 TABLET | Refills: 5 | Status: SHIPPED | OUTPATIENT
Start: 2019-05-17 | End: 2019-09-23

## 2019-05-17 RX ORDER — GLIMEPIRIDE 4 MG/1
TABLET ORAL
Qty: 60 TABLET | Refills: 5 | Status: SHIPPED | OUTPATIENT
Start: 2019-05-17 | End: 2019-09-23

## 2019-05-17 RX ORDER — AMLODIPINE BESYLATE 10 MG/1
10 TABLET ORAL DAILY
Qty: 30 TABLET | Refills: 5 | Status: SHIPPED | OUTPATIENT
Start: 2019-05-17 | End: 2019-09-23

## 2019-05-17 RX ORDER — HYDROCODONE BITARTRATE AND ACETAMINOPHEN 5; 325 MG/1; MG/1
TABLET ORAL
Qty: 40 TABLET | Refills: 0 | Status: SHIPPED | OUTPATIENT
Start: 2019-05-17 | End: 2019-06-28

## 2019-05-17 ASSESSMENT — MIFFLIN-ST. JEOR: SCORE: 1031.13

## 2019-05-17 NOTE — PROGRESS NOTES
"  SUBJECTIVE:   Enrique Pruitt is a 74 year old female who presents to clinic today for the following   health issues:      Concern - Respiratory Concerns   Onset: 2-3 weeks ago     Description:   Son of patient states that they have noticed wheezing when patient breaths through nose. No recent illness/cold/cough or other symptoms. Would like lungs listened too.    Intensity: mild    Progression of Symptoms:  same    Previous history of similar problem:   None     Therapies Tried and outcome: None    SUBJECTIVE:  Here today with the above respiratory symptoms.  Seen with her son who does some of the interpretation the patient speaks decent English.  Has had a couple of weeks of congestion primarily in her chest and in her sinuses.  Does not feel sick with no fevers or chills but she feels a bit wheezy and a bit short of breath.  Not necessarily dyspnea on exertion per se and no cardiac symptoms.  Is due for recheck of her diabetes.  Says she is compliant with medications though the numbers do not necessarily add up with our prescriptions.  So in the recheck I will take this into account.  Deals with polyneuropathy and I am trying to minimize the use of at risk medications.  She is on Lyrica but her use of that is a bit inconsistent.  She does occasionally use some hydrocodone but I have discussed with her that this is a bit high risk given her age and we are trying to minimize its use.    Review of systems otherwise negative.  Past medical, family, and social history reviewed and updated in chart.    OBJECTIVE:  /66 (BP Location: Right arm, Patient Position: Chair, Cuff Size: Adult Regular)   Pulse 107   Temp 98.6  F (37  C) (Oral)   Ht 1.448 m (4' 9\")   Wt 66.2 kg (146 lb)   LMP  (LMP Unknown)   SpO2 98%   Breastfeeding? No   BMI 31.59 kg/m    Alert, pleasant, upbeat, and in no apparent discomfort.  Ears normal. Throat and pharynx normal. Neck supple. No adenopathy or masses in the neck or " supraclavicular regions. Sinuses non tender.  S1 and S2 normal, no murmurs, clicks, gallops or rubs. Regular rate and rhythm. Chest is clear; no wheezes or rales. No edema or JVD.  Past labs reviewed with the patient.     ASSESSMENT / PLAN:  (R05) Cough  (primary encounter diagnosis)  Comment: Discussed mechanism of action of the proposed medication, as well as potential effects, both good and bad.  Patient expressed understanding and agreed with treatment.   Plan: amoxicillin (AMOXIL) 500 MG capsule            (E11.42) Type 2 diabetes mellitus with diabetic polyneuropathy, without long-term current use of insulin (H)  Comment: Recheck with cautions as above.  Adjust as needed  Plan: glimepiride (AMARYL) 4 MG tablet, metFORMIN         (GLUCOPHAGE) 500 MG tablet, pioglitazone         (ACTOS) 45 MG tablet, sitagliptin (JANUVIA) 50         MG tablet, Hemoglobin A1c, Comprehensive         metabolic panel            (I10) Essential hypertension with goal blood pressure less than 140/90  Comment: Controlled on her current regimen.  Continue same  Plan: amLODIPine (NORVASC) 10 MG tablet            (E11.42) Diabetic polyneuropathy associated with type 2 diabetes mellitus  Comment:   Plan: HYDROcodone-acetaminophen (NORCO) 5-325 MG         tablet            (E78.5) Hyperlipidemia LDL goal <70  Comment:   Plan: simvastatin (ZOCOR) 20 MG tablet            (G89.4) Chronic pain syndrome  Comment: CSA updated today and will obtain urine drug screening as well  Plan: Drug Abuse Screen Panel 13, Urine (Pain Care         Package)            Follow up 3 months  TIMMY Peguero MD    (Chart documentation completed in part with Dragon voice-recognition software.  Even though reviewed some grammatical, spelling, and word errors may remain.)

## 2019-05-17 NOTE — LETTER
[facility]  05/17/19    Patient: Enrique Pruitt  YOB: 1945  Medical Record Number: [id number]                                                                  Opioid / Opioid Plus Controlled Substance Agreement    I understand that my care provider has prescribed an opioid (narcotic) controlled substance to help manage my condition(s). I am taking this medicine to help me function or work. I know this is strong medicine, and that it can cause serious side effects. Opioid medicine can be sedating, addicting and may cause a dependency on the drug. They can affect my ability to drive or think, and cause depression. They need to be taken exactly as prescribed. Combining opioids with certain medicines or chemicals (such as cocaine, sedatives and tranquilizers, sleeping pills, meth) can be dangerous or even fatal. Also, if I stop opioids suddenly, I may have severe withdrawal symptoms. Last, I understand that opioids do not work for all types of pain nor for all patients. If not helpful, I may be asked to stop them.        The risks, benefits, and side effects of these medicine(s) were explained to me. I agree that:    1. I will take part in other treatments as advised by my care team. This may be psychiatry or counseling, physical therapy, behavioral therapy, group treatment or a referral to a pain clinic. I will reduce or stop my medicine when my care team tells me to do so.  2. I will take my medicines as prescribed. I will not change the dose or schedule unless my care team tells me to. There will be no refills if I  run out early.   I may be contactedwithout warning and asked to complete a urine drug test or pill count at any time.   3. I will keep all my appointments, and understand this is part of the monitoring of opioids. My care team may require an office visit for EVERY opioid/controlled substance refill. If I miss appointments or don t follow instructions, my care team may  stop my medicine.  4. I will not ask other providers to prescribe controlled substances, and I will not accept controlled substances from other people. If I need another prescribed controlled substance for a new reason, I will tell my care team within 1 business day.  5. I will use one pharmacy to fill all of my controlled substance prescriptions, and it is up to me to make sure that I do not run out of my medicines on weekends or holidays. If my care team is willing to refill my opioid prescription without a visit, I must request refills only during office hours, refills may take up to 3 days to process, and it may take up to 5 to 7 days for my medicine to be mailed and ready at my pharmacy. Prescriptions will not be mailed anywhere except my pharmacy.        [id number]  Rev 12/18         Registration to scan to EHR                             Page 1 of 2               Controlled Substance Agreement Opioid        [facility]  05/17/19  Patient: Enrique Pruitt  YOB: 1945  Medical Record Number: [id number]                                                                  6. I am responsible for my prescriptions. If the medicine/prescription is lost or stolen, it will not be replaced. I also agree not to share controlled substance medicines with anyone.  7. I agree to not use ANY illegal or recreational drugs. This includes marijuana, cocaine, bath salts or other drugs. I agree not to use alcohol unless my care team says I may.          I agree to give urine samples whenever asked. If I don t give a urine sample, the care team may stop my medicine.    8. If I enroll in the [place] Medical Marijuana program, I will tell my care team. I will also sign an agreement to share my medical records with my care team.   9. I will bring in my list of medicines (or my medicine bottles) each time I come to the clinic.   10. I will tell my care team right away if I become pregnant or have a new medical  problem treated outside of my regular clinic.  11. I understand that this medicine can affect my thinking and judgment. It may be unsafe for me to drive, use machinery and do dangerous tasks. I will not do any of these things until I know how the medicine affects me. If my dose changes, I will wait to see how it affects me. I will contact my care team if I have concerns about medicine side effects.    I understand that if I do not follow any of the conditions above, my prescriptions or treatment may be stopped.      I agree that my provider, clinic care team, and pharmacy may work with any city, state or federal law enforcement agency that investigates the misuse, sale, or other diversion of my controlled medicine. I will allow my provider to discuss my care with or share a copy of this agreement with any other treating provider, pharmacy or emergency room where I receive care. I agree to give up (waive) any right of privacy or confidentiality with respect to these consents.     I have read this agreement and have asked questions about anything I did not understand.      ________________________________________________________________________  Patient signature - Date/Time -  Enrique Pruitt                                      ________________________________________________________________________  Witness signature                                                            ________________________________________________________________________  Provider signature - Laan Peguero MD      [id number]  Rev 12/18         Registration to scan to EHR                         Page 2 of 2                   Controlled Substance Agreement Opioid           Page 1 of 2  Opioid Pain Medicines (also known as Narcotics)  What You Need to Know    What are opioids?   Opioids are pain medicines that must be prescribed by a doctor.  They are also known as narcotics.    Examples are:     morphine (MS Contin,  [place])    oxycodone (Oxycontin)    oxycodone and acetaminophen (Percocet)    hydrocodone and acetaminophen (Vicodin, Norco)     fentanyl patch (Duragesic)     hydromorphone (Dilaudid)     methadone     What do opioids do well?   Opioids are best for short-term pain after a surgery or injury. They also work well for cancer pain. Unlike other pain medicines, they do not cause liver or kidney failure or ulcers. They may help some people with long-lasting (chronic) pain.     What do opioids NOT do well?   Opioids never get rid of pain entirely, and they do not work well for most patients with chronic pain. Opioids do not reduce swelling, one of the causes of pain. They also don t work well for nerve pain.                           For informational purposes only.  Not to replace the advice of your care provider.  Copyright [facility]. All right reserved. [employer] [id number] 02/18.      Page 2 of 2    Risks and side effects   Talk to your doctor before you start or decide to keep taking one of these medicines. Side effects include:    Lowering your breathing rate enough to cause death    Overdose, including death, especially if taking higher than prescribed doses    Long-term opioid use    Worse depression symptoms; less pleasure in things you usually enjoy    Feeling tired or sluggish    Slower thoughts or cloudy thinking    Being more sensitive to pain over time; pain is harder to control    Trouble sleeping or restless sleep    Changes in hormone levels (for example, less testosterone)    Changes in sex drive or ability to have sex    Constipation    Unsafe driving    Itching and sweating    Feeling dizzy    Nausea, vomiting and dry mouth    What else should I know about opioids?  When someone takes opioids for too long or too often, they become dependent. This means that if you stop or reduce the medicine too quickly, you will have withdrawal symptoms.    Dependence is not the same as addiction.  Addiction is when people keep using a substance that harms their body, their mind or their relations with others. If you have a history of drug or alcohol abuse, taking opioids can cause a relapse.    Over time, opioids don t work as well. Most people will need higher and higher doses. The higher the dose, the more serious the side effects. We don t know the long-term effects of opioids.      Prescribed opioids aren't the best way to manage chronic pain    Other ways to manage pain include:      Ibuprofen or acetaminophen.  You should always try this first.      Treat health problems that may be causing pain.      acupuncture or massage, deep breathing, meditation, visual imagery, aromatherapy.      Use heat or ice at the pain site      Physical therapy and exercise      Stop smoking      See a counselor or therapist                                                  People who have used opioids for a long time may have a lower quality of life, worse depression, higher levels of pain and more visits to doctors.    Never share your opioids with others. Be sure to store opioids in a secure place, locked if possible.Young children can easily swallow them and overdose.     You can overdose on opioids.  Signs of overdose include decrease or loss of consciousness, slowed breathing, trouble waking and blue lips.  If someone is worried about overdose, they should call 911.    If you are at risk for overdose, you may get naloxone (Narcan, a medicine that reverses the effects of opioids.  If you overdose, a friend or family member can give you Narcan while waiting for the ambulance.  They need to know the signs of overdose and how to give Narcan.    While you're taking opioids:    Don't use alcohol or street drugs. Taking them together can cause death.    Don't take any of these medicines unless your doctor says its okay.  Taking these with opioids can cause death.    Benzodiazepines (such as lorazepam         or  diazepam)    Muscle relaxers (such as cyclobenzaprine)    sleeping pills    other opioids    Safe disposal of opioids  Find your area drug take-back program, your pharmacy mail-back program, buy a special disposal bag (such as Deterra) from your pharmacy or flush them down the toilet.  Use the guidelines at:  www.fda.gov/drugs/resourcesforyou

## 2019-05-20 ENCOUNTER — PATIENT OUTREACH (OUTPATIENT)
Dept: GERIATRIC MEDICINE | Facility: CLINIC | Age: 74
End: 2019-05-20

## 2019-05-20 LAB
ALBUMIN SERPL-MCNC: 4 G/DL (ref 3.4–5)
ALP SERPL-CCNC: 129 U/L (ref 40–150)
ALT SERPL W P-5'-P-CCNC: 14 U/L (ref 0–50)
AMPHETAMINES UR QL: NOT DETECTED NG/ML
ANION GAP SERPL CALCULATED.3IONS-SCNC: 4 MMOL/L (ref 3–14)
AST SERPL W P-5'-P-CCNC: 8 U/L (ref 0–45)
BARBITURATES UR QL SCN: NOT DETECTED NG/ML
BENZODIAZ UR QL SCN: NOT DETECTED NG/ML
BILIRUB SERPL-MCNC: 0.4 MG/DL (ref 0.2–1.3)
BUN SERPL-MCNC: 17 MG/DL (ref 7–30)
BUPRENORPHINE UR QL: NOT DETECTED NG/ML
CALCIUM SERPL-MCNC: 9.3 MG/DL (ref 8.5–10.1)
CANNABINOIDS UR QL: NOT DETECTED NG/ML
CHLORIDE SERPL-SCNC: 103 MMOL/L (ref 94–109)
CO2 SERPL-SCNC: 28 MMOL/L (ref 20–32)
COCAINE UR QL SCN: NOT DETECTED NG/ML
CREAT SERPL-MCNC: 1.06 MG/DL (ref 0.52–1.04)
D-METHAMPHET UR QL: NOT DETECTED NG/ML
GFR SERPL CREATININE-BSD FRML MDRD: 51 ML/MIN/{1.73_M2}
GLUCOSE SERPL-MCNC: 317 MG/DL (ref 70–99)
METHADONE UR QL SCN: NOT DETECTED NG/ML
OPIATES UR QL SCN: NOT DETECTED NG/ML
OXYCODONE UR QL SCN: NOT DETECTED NG/ML
PCP UR QL SCN: NOT DETECTED NG/ML
POTASSIUM SERPL-SCNC: 4.5 MMOL/L (ref 3.4–5.3)
PROPOXYPH UR QL: NOT DETECTED NG/ML
PROT SERPL-MCNC: 8 G/DL (ref 6.8–8.8)
SODIUM SERPL-SCNC: 135 MMOL/L (ref 133–144)
TRICYCLICS UR QL SCN: NOT DETECTED NG/ML

## 2019-05-20 NOTE — LETTER
[address], [address]  [place] [address]  Phone:  [phone]  Fax:  [phone]        May 20, 2019    ENRIQUE NEWBERRY  [address] [address]    Dear Enrique,    Evan is a copy of your completed PCA Assessment and Service Plan.  This is for your records and no action is required by you.  If you have additional questions regarding your assessment please contact me at  . If you feel that your needs are not being met, please contact the Clinical Supervisor at [phone].    Sincerely,    Regine Stapleton RN, PHN    E-mail:[email or web address]  Phone: [phone]    Care Manager  [facility]            Enclosure:  Completed PCA assessment

## 2019-05-20 NOTE — LETTER
June 6, 2019      VAN NEWBERRY  [address] [address]      Dear Van:    At [facility], we are dedicated to improving your health and well-being. Enclosed is the Comprehensive Care Plan that we developed with you on 5/15/19. Please review the Care Plan carefully.    As a reminder, some of the things we discussed at your visit include:    Your physical and mental health    Ways to reduce falls    Health care needs you may have    Don t forget to contact your care coordinator if you:    Have been hospitalized or plan to be hospitalized     Have had a fall     Have experienced a change in physical health    Are experiencing emotional problems     If you do not agree with your Care Plan, have questions about it, or have experienced a change in your needs, please call me at  . If you are hearing impaired, please call the [place] Relay at 215 or [phone] (auihhy-lg-hhjaqb relay service).    Sincerely,    Regine Stapleton RN, PHN    E-mail:[email or web address]  Phone: [phone]    Care Manager  [facility] ([facility] in [place]) is a health plan that contracts with both Medicare and the [place] Medical Assistance (Medicaid) program to provide benefits of both programs to enrollees. Enrollment in [facility] depends on contract renewal.    [facility]+[id number])     [id number] (11/18)

## 2019-05-20 NOTE — PROGRESS NOTES
[facility] Care Coordination Contact    [facility]:  Emailed completed PCA assessment to [facility].  Faxed copy of PCA assessment to [facility] Agency and mailed copy to member.  Faxed MD Communication to PCP.     Brittney Lee  Care Management Specialist  [facility]  [phone]

## 2019-05-29 ENCOUNTER — TELEPHONE (OUTPATIENT)
Dept: FAMILY MEDICINE | Facility: CLINIC | Age: 74
End: 2019-05-29

## 2019-05-29 NOTE — RESULT ENCOUNTER NOTE
Call patient (and send a copy of labs):  Her diabetes is out of control.  Much worse than last time.  Please make sure she is taking her medication.  And we need to recheck in August.    TIMMY Peguero M.D.

## 2019-05-29 NOTE — TELEPHONE ENCOUNTER
Notes recorded by Lana Peguero MD on 5/28/2019 at 9:56 PM CDT  Call patient (and send a copy of labs):  Her diabetes is out of control.  Much worse than last time.  Please make sure she is taking her medication.  And we need to recheck in August.    S. Titi Peguero M.D.    This writer attempted to contact Oudomphone, patient daughter (she is all #'s listed in chart)  on 05/29/19  *Consent to communicate is on file    Reason for call results and provider plan and left message.      If patient calls back:   Registered Nurse called. Follow Triage Call workflow        Abby Medina RN

## 2019-05-31 NOTE — TELEPHONE ENCOUNTER
Parked call retrieved. Shanika informed of the below per provider documentation. Shanika verbalizes understanding and does not have any further questions or concerns at this time. She did verify that patient is taking her medication as directed. She will call back to schedule patient to be seen in August.     Pattie Mejía RN

## 2019-05-31 NOTE — TELEPHONE ENCOUNTER
This writer attempted to contact Shanika, patient's daughter (consent to communicate on file) on 05/31/19      Reason for call results and left message.      If patient calls back:   Registered Nurse called. Follow Triage Call workflow        Pattie Mejía RN     WDL

## 2019-05-31 NOTE — TELEPHONE ENCOUNTER
Daughter  returned call    Best number to reach caller: Other phone number:  [phone]*    Is it ok to leave a detailed message: yuri

## 2019-06-06 NOTE — PROGRESS NOTES
[facility] Care Coordination Contact    Received after visit chart from care coordinator.  Completed following tasks: Mailed copy of care plan to client, Updated services in access and Submitted referrals/auths for [facility].     Provider Signature - No POC Shared:  Member indicates that they do not want their POC shared with any [facility] providers.    Brittney Lee  Care Management Specialist  [facility]  [phone]

## 2019-06-28 DIAGNOSIS — G89.4 CHRONIC PAIN SYNDROME: ICD-10-CM

## 2019-06-28 DIAGNOSIS — E11.42 DIABETIC POLYNEUROPATHY ASSOCIATED WITH TYPE 2 DIABETES MELLITUS (H): ICD-10-CM

## 2019-06-28 RX ORDER — HYDROCODONE BITARTRATE AND ACETAMINOPHEN 5; 325 MG/1; MG/1
TABLET ORAL
Qty: 40 TABLET | Refills: 0 | Status: SHIPPED | OUTPATIENT
Start: 2019-06-28 | End: 2019-07-31

## 2019-06-28 NOTE — TELEPHONE ENCOUNTER
Controlled Substance Refill Request for Norco 5-325 mg  Problem List Complete:  Yes   checked in past 3 months?  Yes 6/28/19     [place]  reviewed- no concerns    [place]:  [email or web address]      Routing refill request to provider for review/approval because:  Drug not on the [facility] refill protocol     Abby Medina RN

## 2019-06-28 NOTE — TELEPHONE ENCOUNTER
.Reason for Call:  Medication or medication refill:    Do you use a [facility]?  Name of the pharmacy and phone number for the current request:  will  script    Name of the medication requested: hydrocodone-acetaminophen (NORCO)5-325 milligram tab    Other request: call hen ready    Can we leave a detailed message on this number? YES    Phone number patient can be reached at: Home number on file [phone] (home)    Best Time: anytime    Call taken on 6/28/2019 at 8:26 AM by Rosana Cedeno

## 2019-06-28 NOTE — TELEPHONE ENCOUNTER
Requested Prescriptions   Pending Prescriptions Disp Refills     HYDROcodone-acetaminophen (NORCO) 5-325 MG tablet  Last Written Prescription Date:  .5/17/19  Last Fill Quantity: 40 tablet,  # refills: 0   Last office visit: 5/17/2019 with prescribing provider:  Dr. Peguero   Future Office Visit:      Routing refill request to provider for review/approval because:  Drug not on the [facility], UMP or  Health refill protocol or controlled substance   40 tablet 0     Sig: TAKE 1 TO 2 TABLETS BY MOUTH 3 TIMES DAILY AS NEEDED FOR MODERATE TO SVERE PAIN       There is no refill protocol information for this order

## 2019-07-10 ENCOUNTER — TELEPHONE (OUTPATIENT)
Dept: FAMILY MEDICINE | Facility: CLINIC | Age: 74
End: 2019-07-10

## 2019-07-10 NOTE — TELEPHONE ENCOUNTER
Forms from [employer] placed on Dr. Marielena alcaraz for completion    Sheri Gonzalez

## 2019-07-29 DIAGNOSIS — E11.42 DIABETIC POLYNEUROPATHY ASSOCIATED WITH TYPE 2 DIABETES MELLITUS (H): ICD-10-CM

## 2019-07-29 NOTE — TELEPHONE ENCOUNTER
Requested Prescriptions   Pending Prescriptions Disp Refills     HYDROcodone-acetaminophen (NORCO) 5-325 MG tablet 40 tablet 0     Sig: TAKE 1 TO 2 TABLETS BY MOUTH 3 TIMES DAILY AS NEEDED FOR MODERATE TO SVERE PAIN       There is no refill protocol information for this order          HYDROcodone-acetaminophen (NORCO) 5-325 MG tablet      Last Written Prescription Date:  6/28/19  Last Fill Quantity: 40,   # refills: 0  Last Office Visit: 5/17/19  Future Office visit:       Routing refill request to provider for review/approval because:  Drug not on the [facility], P or [facility] refill protocol or controlled substance

## 2019-07-29 NOTE — TELEPHONE ENCOUNTER
Reason for Call:  Medication or medication refill:    Do you use a [facility]?  Name of the pharmacy and phone number for the current request:  WRITTEN PRESCRIPTION REQUESTED    Name of the medication requested: Pending Prescriptions:                       Disp   Refills    HYDROcodone-acetaminophen (NORCO) 5-325 M*40 tab*0            Sig: TAKE 1 TO 2 TABLETS BY MOUTH 3 TIMES DAILY AS           NEEDED FOR MODERATE TO SVERE PAIN    Other request: Please call daughter when approved.    Can we leave a detailed message on this number? YES    Phone number Dmitri can be reached at: GARY VILLAGRAN [phone]    Best Time: any     Call taken on 7/29/2019 at 11:17 AM by Debi Nava

## 2019-07-31 RX ORDER — HYDROCODONE BITARTRATE AND ACETAMINOPHEN 5; 325 MG/1; MG/1
TABLET ORAL
Qty: 40 TABLET | Refills: 0 | Status: SHIPPED | OUTPATIENT
Start: 2019-07-31 | End: 2019-09-04

## 2019-07-31 NOTE — TELEPHONE ENCOUNTER
Controlled Substance Refill Request for Norco  Problem List Complete:  Yes  Chronic pain syndrome   Problem Detail     Noted:  12/14/2016   Priority:  Medium   Overview Addendum 6/28/2019 10:08 AM by Abby Medina RN   Patient is followed by Lana Peguero MD for ongoing prescription of pain medication.  All refills should be approved by this provider, or covering partner.     Medication(s): vicodin 5.   Maximum quantity per month: 60 = < 90 MED  Narcan n/a  Clinic visit frequency required: Q 6  months      Controlled substance agreement: 5/17/19  Encounter-Level CSA - 04/25/2017:    Controlled Substance Agreement - Scan on 5/2/2017  8:46 AM: CONTROLLED SUBSTANCE AGREEMENT (below)         Patient-Level CSA:    Controlled Substance Agreement - Opioid - Scan on 5/20/2019  6:59 AM (below)            Pain Clinic evaluation in the past: No     DIRE Total Score(s):  No flowsheet data found.     Last MNP website verification: 06/28/19   [email or web address]    Previous Version      checked in past 3 months?  Yes 6/28/19     Last Written Prescription Date:  6/28/19  Last Fill Quantity: 40,  # refills: 0   Last office visit: 5/17/2019 with prescribing provider:  Marielena   Future Office Visit:      RX [place] reviewed:  not reviewed/not due - last done on 6/28/19    [place] profile:  [email or web address]    ANNI Bhatti, RN, PHN

## 2019-09-03 ENCOUNTER — PATIENT OUTREACH (OUTPATIENT)
Dept: GERIATRIC MEDICINE | Facility: CLINIC | Age: 74
End: 2019-09-03

## 2019-09-03 NOTE — PROGRESS NOTES
[facility] Care Coordination Contact    Member's lift chair needs repair. DME provider asking for member to complete a visit with PCP for lift chair order to get repairs covered under [place]. Vendor states if lift chair is older than 7 years, member may qualifty for a new chair.    CC left VM for member/family to contact CC.       CC spoke to member's daughter, Jaleel and she indicates the lift chair is older than 7 years and in bad shape. Family/member would prefer a replacement lift chair.     CC put in a request with CMS team to order a new lift chair (light blue.)       Regine Stapleton RN, PHN  [facility]  [phone]

## 2019-09-04 ENCOUNTER — TELEPHONE (OUTPATIENT)
Dept: FAMILY MEDICINE | Facility: CLINIC | Age: 74
End: 2019-09-04

## 2019-09-04 DIAGNOSIS — E11.42 DIABETIC POLYNEUROPATHY ASSOCIATED WITH TYPE 2 DIABETES MELLITUS (H): ICD-10-CM

## 2019-09-04 RX ORDER — HYDROCODONE BITARTRATE AND ACETAMINOPHEN 5; 325 MG/1; MG/1
TABLET ORAL
Qty: 40 TABLET | Refills: 0 | Status: SHIPPED | OUTPATIENT
Start: 2019-09-04 | End: 2019-09-23

## 2019-09-04 NOTE — TELEPHONE ENCOUNTER
Lm informing pt Rx at ront desk, and pt is due for OV prior to next refill.      Luba CAR, Patient Care

## 2019-09-04 NOTE — TELEPHONE ENCOUNTER
Reason for Call:  Medication or medication refill:    Do you use a [facility]?  Name of the pharmacy and phone number for the current request:  WRITTEN PRESCRIPTION REQUESTED     Name of the medication requested: Pending Prescriptions:                       Disp   Refills    HYDROcodone-acetaminophen (NORCO) 5-325 M*40 tab*0            Sig: TAKE 1 TO 2 TABLETS BY MOUTH 3 TIMES DAILY AS           NEEDED FOR MODERATE TO SVERE PAIN    Other request: Please call when approved.    Can we leave a detailed message on this number? YES    Phone number patient can be reached at: Home number on file [phone] (home)    Best Time: any    Call taken on 9/4/2019 at 1:21 PM by Debi Nava

## 2019-09-04 NOTE — TELEPHONE ENCOUNTER
I will authorize one refill - patient should be seen for further refills.     We now see folks every 3 months on pain medication

## 2019-09-04 NOTE — TELEPHONE ENCOUNTER
Controlled Substance Refill Request for Norco  Problem List Complete:  Yes  Chronic pain syndrome   Problem Detail     Noted:  12/14/2016   Priority:  Medium   Overview Addendum 6/28/2019 10:08 AM by Abby Medina RN   Patient is followed by Lana Peguero MD for ongoing prescription of pain medication.  All refills should be approved by this provider, or covering partner.     Medication(s): vicodin 5.   Maximum quantity per month: 60 = < 90 MED  Narcan n/a  Clinic visit frequency required: Q 6  months      Controlled substance agreement: 5/17/19  Encounter-Level CSA - 04/25/2017:    Controlled Substance Agreement - Scan on 5/2/2017  8:46 AM: CONTROLLED SUBSTANCE AGREEMENT (below)         Patient-Level CSA:    Controlled Substance Agreement - Opioid - Scan on 5/20/2019  6:59 AM (below)            Pain Clinic evaluation in the past: No     DIRE Total Score(s):  No flowsheet data found.     Last MNP website verification: 06/28/19   [email or web address]       checked in past 3 months?  Yes 6/28/19   Last Written Prescription Date:  7/31/19  Last Fill Quantity: 40 tablets  # refills: 0   Last office visit: 5/17/2019 with prescribing provider:  5/17/19   Future Office Visit:  None  RX monitoring program ([place]) reviewed:  not reviewed/not due - last done on 6/28/19    M[place]MP profile:  [email or web address]        Miki Nova RN, BSN, PHN

## 2019-09-16 ENCOUNTER — PATIENT OUTREACH (OUTPATIENT)
Dept: GERIATRIC MEDICINE | Facility: CLINIC | Age: 74
End: 2019-09-16

## 2019-09-16 NOTE — PROGRESS NOTES
[facility] Care Coordination Contact    Left VM for member/family to contact CC to complete an appointment with PCP to assess for a lift chair.     Regine Staplteon RN, PHN  [facility]  [phone]

## 2019-09-16 NOTE — PROGRESS NOTES
Member will need to be seen or have an appt scheduled for face to face before lift chair order can be placed due to required documents needed per [facility].    Brittney Lee  Care Management Specialist  [facility]  [phone]

## 2019-09-23 ENCOUNTER — OFFICE VISIT (OUTPATIENT)
Dept: FAMILY MEDICINE | Facility: CLINIC | Age: 74
End: 2019-09-23
Payer: COMMERCIAL

## 2019-09-23 VITALS
SYSTOLIC BLOOD PRESSURE: 106 MMHG | WEIGHT: 148 LBS | BODY MASS INDEX: 31.93 KG/M2 | HEIGHT: 57 IN | OXYGEN SATURATION: 98 % | HEART RATE: 107 BPM | DIASTOLIC BLOOD PRESSURE: 62 MMHG | TEMPERATURE: 98.2 F

## 2019-09-23 DIAGNOSIS — N18.30 CKD (CHRONIC KIDNEY DISEASE) STAGE 3, GFR 30-59 ML/MIN (H): ICD-10-CM

## 2019-09-23 DIAGNOSIS — G89.4 CHRONIC PAIN SYNDROME: ICD-10-CM

## 2019-09-23 DIAGNOSIS — E11.42 DIABETIC POLYNEUROPATHY ASSOCIATED WITH TYPE 2 DIABETES MELLITUS (H): ICD-10-CM

## 2019-09-23 DIAGNOSIS — E11.42 TYPE 2 DIABETES MELLITUS WITH DIABETIC POLYNEUROPATHY, WITHOUT LONG-TERM CURRENT USE OF INSULIN (H): Primary | ICD-10-CM

## 2019-09-23 DIAGNOSIS — I10 ESSENTIAL HYPERTENSION WITH GOAL BLOOD PRESSURE LESS THAN 140/90: ICD-10-CM

## 2019-09-23 DIAGNOSIS — R29.898 WEAKNESS OF BOTH LOWER EXTREMITIES: ICD-10-CM

## 2019-09-23 DIAGNOSIS — Z12.11 SPECIAL SCREENING FOR MALIGNANT NEOPLASMS, COLON: ICD-10-CM

## 2019-09-23 DIAGNOSIS — E78.5 HYPERLIPIDEMIA LDL GOAL <70: ICD-10-CM

## 2019-09-23 DIAGNOSIS — R05.9 COUGH: ICD-10-CM

## 2019-09-23 LAB
ANION GAP SERPL CALCULATED.3IONS-SCNC: 8 MMOL/L (ref 3–14)
BUN SERPL-MCNC: 23 MG/DL (ref 7–30)
CALCIUM SERPL-MCNC: 9.1 MG/DL (ref 8.5–10.1)
CHLORIDE SERPL-SCNC: 105 MMOL/L (ref 94–109)
CHOLEST SERPL-MCNC: 192 MG/DL
CO2 SERPL-SCNC: 26 MMOL/L (ref 20–32)
CREAT SERPL-MCNC: 0.85 MG/DL (ref 0.52–1.04)
CREAT UR-MCNC: 184 MG/DL
GFR SERPL CREATININE-BSD FRML MDRD: 66 ML/MIN/{1.73_M2}
GLUCOSE SERPL-MCNC: 214 MG/DL (ref 70–99)
HBA1C MFR BLD: 7.1 % (ref 0–5.6)
HDLC SERPL-MCNC: 60 MG/DL
LDLC SERPL CALC-MCNC: 106 MG/DL
MICROALBUMIN UR-MCNC: 63 MG/L
MICROALBUMIN/CREAT UR: 34.4 MG/G CR (ref 0–25)
NONHDLC SERPL-MCNC: 132 MG/DL
POTASSIUM SERPL-SCNC: 4.3 MMOL/L (ref 3.4–5.3)
SODIUM SERPL-SCNC: 139 MMOL/L (ref 133–144)
TRIGL SERPL-MCNC: 128 MG/DL

## 2019-09-23 PROCEDURE — 80048 BASIC METABOLIC PNL TOTAL CA: CPT | Performed by: FAMILY MEDICINE

## 2019-09-23 PROCEDURE — 82043 UR ALBUMIN QUANTITATIVE: CPT | Performed by: FAMILY MEDICINE

## 2019-09-23 PROCEDURE — 99214 OFFICE O/P EST MOD 30 MIN: CPT | Performed by: FAMILY MEDICINE

## 2019-09-23 PROCEDURE — 80061 LIPID PANEL: CPT | Performed by: FAMILY MEDICINE

## 2019-09-23 PROCEDURE — 83036 HEMOGLOBIN GLYCOSYLATED A1C: CPT | Performed by: FAMILY MEDICINE

## 2019-09-23 PROCEDURE — 36415 COLL VENOUS BLD VENIPUNCTURE: CPT | Performed by: FAMILY MEDICINE

## 2019-09-23 RX ORDER — HYDROCODONE BITARTRATE AND ACETAMINOPHEN 5; 325 MG/1; MG/1
TABLET ORAL
Qty: 40 TABLET | Refills: 0 | Status: SHIPPED | OUTPATIENT
Start: 2019-09-23 | End: 2019-10-30

## 2019-09-23 RX ORDER — BENZONATATE 100 MG/1
100-200 CAPSULE ORAL 3 TIMES DAILY PRN
Qty: 30 CAPSULE | Refills: 1 | Status: SHIPPED | OUTPATIENT
Start: 2019-09-23 | End: 2020-06-05

## 2019-09-23 RX ORDER — SIMVASTATIN 20 MG
20 TABLET ORAL DAILY
Qty: 90 TABLET | Refills: 1 | Status: SHIPPED | OUTPATIENT
Start: 2019-09-23 | End: 2020-01-21

## 2019-09-23 RX ORDER — PIOGLITAZONEHYDROCHLORIDE 45 MG/1
45 TABLET ORAL DAILY
Qty: 90 TABLET | Refills: 1 | Status: SHIPPED | OUTPATIENT
Start: 2019-09-23 | End: 2020-01-20

## 2019-09-23 RX ORDER — GLIMEPIRIDE 4 MG/1
TABLET ORAL
Qty: 180 TABLET | Refills: 1 | Status: SHIPPED | OUTPATIENT
Start: 2019-09-23 | End: 2020-01-21

## 2019-09-23 RX ORDER — AMLODIPINE BESYLATE 10 MG/1
10 TABLET ORAL DAILY
Qty: 90 TABLET | Refills: 1 | Status: SHIPPED | OUTPATIENT
Start: 2019-09-23 | End: 2020-01-20

## 2019-09-23 ASSESSMENT — MIFFLIN-ST. JEOR: SCORE: 1040.2

## 2019-09-23 NOTE — PROGRESS NOTES
Subjective     Enrique Pruitt is a 74 year old female who presents to clinic today for the following health issues:    HPI   Diabetes Follow-up      How often are you checking your blood sugar? Two times daily    What time of day are you checking your blood sugars (select all that apply)?  Before meals    Have you had any blood sugars above 200?  No    Have you had any blood sugars below 70?  No    What symptoms do you notice when your blood sugar is low?  None    What concerns do you have today about your diabetes? None     Do you have any of these symptoms? (Select all that apply)  Numbness in feet     Have you had a diabetic eye exam in the last 12 months? No    Diabetes Management Resources    Hyperlipidemia Follow-Up      Are you having any of the following symptoms? (Select all that apply)  No complaints of shortness of breath, chest pain or pressure.  No increased sweating or nausea with activity.  No left-sided neck or arm pain.  No complaints of pain in calves when walking 1-2 blocks.    Are you regularly taking any medication or supplement to lower your cholesterol?   Yes- Simvastatin    Are you having muscle aches or other side effects that you think could be caused by your cholesterol lowering medication?  No    Hypertension Follow-up      Do you check your blood pressure regularly outside of the clinic? No     Are you following a low salt diet? No    Are your blood pressures ever more than 140 on the top number (systolic) OR more   than 90 on the bottom number (diastolic), for example 140/90? No    BP Readings from Last 2 Encounters:   05/17/19 104/66   10/17/18 102/58     Hemoglobin A1C (%)   Date Value   05/17/2019 9.4 (H)   10/15/2018 7.8 (H)     LDL Cholesterol Calculated (mg/dL)   Date Value   09/10/2018 125 (H)   05/03/2017 115 (H)     LDL Cholesterol Direct (mg/dL)   Date Value   12/19/2017 155 (H)     Chronic Pain Follow-Up       Type / Location of Pain: Abdominal   Analgesia/pain control:        Recent changes:  worse      Overall control: Tolerable with discomfort  Activity level/function:      Daily activities:  Unable to perform most daily activities - chores, hobbies, social activities, driving    Work:  not applicable  Adverse effects:  No  Adherance    Taking medication as directed?  Yes    Participating in other treatments: no   Risk Factors:    Sleep:  Fair    Mood/anxiety:  controlled    Recent family or social stressors:  none noted    Other aggravating factors: prolonged standing  PHQ-9 SCORE 4/25/2017 5/15/2018 5/15/2019   PHQ-9 Total Score 9 15 0     PHUONG-7 SCORE 4/25/2017 5/15/2018   Total Score 6 13     Encounter-Level CSA - 04/25/2017:    Controlled Substance Agreement - Scan on 5/2/2017  8:46 AM: CONTROLLED SUBSTANCE AGREEMENT     Patient-Level CSA:    Controlled Substance Agreement - Opioid - Scan on 5/20/2019  6:59 AM           How many servings of fruits and vegetables do you eat daily?  4 or more    On average, how many sweetened beverages do you drink each day (soda, juice, sweet tea, etc)?   0    How many days per week do you miss taking your medication? 0    SUBJECTIVE:  Here today in follow-up of diabetes, hypertension, chronic kidney disease, lipids.  Last I saw her her A1c was above 9.  Patient seen with her daughter who does some of the interpretation and a say that she has been more compliant with her medications.  Suffers from polyneuropathy related to diabetes and has a lot of leg weakness.  Difficult getting up and down from low chairs and couches and they are asking about a lift chair.  This was suggested by a friend with similar issues.  I frequently see the patient here in a wheelchair as it is difficult to walk in from the parking lot.  Has been having a cough for a couple of days.  No fevers or chills and not really productive of phlegm.  Not short of breath but she feels as though she is coming down with a cold.    Review of systems otherwise negative.  Past  "medical, family, and social history reviewed and updated in chart.    OBJECTIVE:  /62 (BP Location: Right arm, Patient Position: Chair, Cuff Size: Adult Regular)   Pulse 107   Temp 98.2  F (36.8  C) (Oral)   Ht 1.448 m (4' 9\")   Wt 67.1 kg (148 lb)   LMP  (LMP Unknown)   SpO2 98%   Breastfeeding? No   BMI 32.03 kg/m    Alert, pleasant, upbeat, and in no apparent discomfort.  S1 and S2 normal, no murmurs, clicks, gallops or rubs. Regular rate and rhythm. Chest is clear; no wheezes or rales. No edema or JVD.  Has some nonspecific age-appropriate muscle thinning in her lower extremities bilaterally, but deep tendon reflexes are normal bilaterally  Past labs reviewed with the patient.   A1c 7.1    ASSESSMENT / PLAN:  (E11.42) Type 2 diabetes mellitus with diabetic polyneuropathy, without long-term current use of insulin (H)  (primary encounter diagnosis)  Comment: Much improved on current regimen.  Will continue same and follow-up recheck in 6 months  Plan: Hemoglobin A1c, Basic metabolic panel, Lipid         panel reflex to direct LDL Non-fasting, Albumin        Random Urine Quantitative with Creat Ratio,         glimepiride (AMARYL) 4 MG tablet, metFORMIN         (GLUCOPHAGE) 500 MG tablet, pioglitazone         (ACTOS) 45 MG tablet, sitagliptin (JANUVIA) 50         MG tablet            (E11.42) Diabetic polyneuropathy associated with type 2 diabetes mellitus (H)  Comment: I think she would be an excellent candidate for a lift chair as this would help improve her ability to get up and down from seated positions and have more mobility around her household.  It would help her to be more independent and getting to the bathroom, etc.  Plan: order for DME            (N18.3) CKD (chronic kidney disease) stage 3, GFR 30-59 ml/min (H)  Comment: Following closely  Plan:     (I10) Essential hypertension with goal blood pressure less than 140/90  Comment: At goal.  Continue to follow  Plan: amLODIPine (NORVASC) 10 " MG tablet            (E78.5) Hyperlipidemia LDL goal <70  Comment: Recheck and adjust as needed  Plan: simvastatin (ZOCOR) 20 MG tablet            (R05) Cough  Comment: Discussed mechanism of action of the proposed medication, as well as potential effects, both good and bad.  Patient expressed understanding and agreed with treatment.   Plan: benzonatate (TESSALON) 100 MG capsule            (Z12.11) Special screening for malignant neoplasms, colon  Comment:   Plan: Fecal colorectal cancer screen (FIT)            (R29.898) Weakness of both lower extremities  Comment: Lift chair as above  Plan: order for DME            Follow up 3 months for routine pain follow-up  TIMMY Peguero MD    (Chart documentation completed in part with Dragon voice-recognition software.  Even though reviewed some grammatical, spelling, and word errors may remain.)

## 2019-09-23 NOTE — LETTER
[address]  [address]  [phone]    September 24, 2019      Enrique Pruitt  [address] [place] [id number]      Dear Enrique,      Your lab work looks just fine.  Keep up the good work.       TIMMY Peguero MD

## 2019-09-27 ENCOUNTER — TELEPHONE (OUTPATIENT)
Dept: FAMILY MEDICINE | Facility: CLINIC | Age: 74
End: 2019-09-27

## 2019-09-27 DIAGNOSIS — K21.9 GASTROESOPHAGEAL REFLUX DISEASE WITHOUT ESOPHAGITIS: ICD-10-CM

## 2019-09-27 NOTE — TELEPHONE ENCOUNTER
"Requested Prescriptions   Pending Prescriptions Disp Refills     omeprazole (PRILOSEC) 20 MG DR capsule [Pharmacy Med Name: OMEPRAZOLE 20MG CAPSULES]  Last Written Prescription Date:  1/03/19 Dr. Lovelace  Last Fill Quantity: 30 capsule,  # refills: 8   Last office visit: 9/23/2019 with provider:  Dr. Peguero  Future Office Visit:     30 capsule 0     Sig: TAKE 1 CAPSULE(20 MG) BY MOUTH DAILY 30 TO 60 MINUTES BEFORE THE EVENING MEAL       PPI Protocol Passed - 9/27/2019 11:04 AM        Passed - Not on Clopidogrel (unless Pantoprazole ordered)        Passed - No diagnosis of osteoporosis on record        Passed - Recent (12 mo) or future (30 days) visit within the authorizing provider's specialty     Patient had office visit in the last 12 months or has a visit in the next 30 days with authorizing provider or within the authorizing provider's specialty.  See \"Patient Info\" tab in inbasket, or \"Choose Columns\" in Meds & Orders section of the refill encounter.              Passed - Medication is active on med list        Passed - Patient is age 18 or older        Passed - No active pregnacy on record        Passed - No positive pregnancy test in past 12 months          "

## 2019-09-27 NOTE — PROGRESS NOTES
Order placed with [facility] (p: [phone]; f: [phone]) for Lift Chair.  Order placed on 09/26/19. Access updated.  As required, authorization submitted to health plan.    Brittney Lee  Care Management Specialist  [facility]  [phone]

## 2019-09-27 NOTE — TELEPHONE ENCOUNTER
[facility] face to face encounter lift chair form and cert of medical necessity placed on Dr. Peguero 's desk for completion.      Lou OCONNELL)([place])

## 2019-10-01 NOTE — TELEPHONE ENCOUNTER
Prescription approved per [facility] Refill Protocol.  Pattie Mejía RN

## 2019-10-03 ENCOUNTER — MEDICAL CORRESPONDENCE (OUTPATIENT)
Dept: HEALTH INFORMATION MANAGEMENT | Facility: CLINIC | Age: 74
End: 2019-10-03

## 2019-10-05 DIAGNOSIS — L30.9 DERMATITIS: ICD-10-CM

## 2019-10-09 RX ORDER — TRIAMCINOLONE ACETONIDE 1 MG/G
CREAM TOPICAL
Qty: 80 G | Refills: 0 | Status: SHIPPED | OUTPATIENT
Start: 2019-10-09 | End: 2020-01-21

## 2019-10-16 NOTE — PROGRESS NOTES
Received quote from [facility].  Auth sent to [facility] and copy sent to [facility].    Brittney Lee  Care Management Specialist  [facility]  [phone]

## 2019-10-30 DIAGNOSIS — G89.4 CHRONIC PAIN SYNDROME: ICD-10-CM

## 2019-10-30 DIAGNOSIS — E11.42 DIABETIC POLYNEUROPATHY ASSOCIATED WITH TYPE 2 DIABETES MELLITUS (H): ICD-10-CM

## 2019-10-30 RX ORDER — HYDROCODONE BITARTRATE AND ACETAMINOPHEN 5; 325 MG/1; MG/1
TABLET ORAL
Qty: 40 TABLET | Refills: 0 | Status: SHIPPED | OUTPATIENT
Start: 2019-10-30 | End: 2019-11-20

## 2019-10-30 NOTE — TELEPHONE ENCOUNTER
Controlled Substance Refill Request for Norco  Problem List Complete:  Yes  Chronic pain syndrome   Problem Detail     Noted:  12/14/2016   Priority:  Medium   Overview Addendum 10/30/2019 12:53 PM by Miki Nova RN   Patient is followed by Lana Peguero MD for ongoing prescription of pain medication.  All refills should be approved by this provider, or covering partner.     Medication(s): vicodin 5.   Maximum quantity per month: 40-60 = < 90 MED  Narcan n/a  Clinic visit frequency required: Q 6  months      Controlled substance agreement: 5/17/19  Encounter-Level CSA - 04/25/2017:    Controlled Substance Agreement - Scan on 5/2/2017  8:46 AM: CONTROLLED SUBSTANCE AGREEMENT (below)         Patient-Level CSA:    Controlled Substance Agreement - Opioid - Scan on 5/20/2019  6:59 AM (below)            Pain Clinic evaluation in the past: No     DIRE Total Score(s):  No flowsheet data found.     Last MNPMP website verification: 10/30/19    [email or web address]       checked in past 3 months?  Yes 10/30/19   Last Written Prescription Date:  9/23/19  Last Fill Quantity: 40 tablets  # refills: 0   Last office visit: 9/23/2019 with prescribing provider:  9/23/19   Future Office Visit:  None  RX monitoring program ([place]NPMP) reviewed:  reviewed- no concerns    MNPMP profile:  [email or web address]          Miki Nova RN, BSN, PHN

## 2019-10-30 NOTE — TELEPHONE ENCOUNTER
Patient is requesting the medication   HYDROcodone-acetaminophen (NORCO) 5-325 MG tablet 40 tablet     Patient has a few days remaining of this medication.  Patient uses the  [facility].

## 2019-11-05 DIAGNOSIS — Z12.11 SPECIAL SCREENING FOR MALIGNANT NEOPLASMS, COLON: ICD-10-CM

## 2019-11-05 PROCEDURE — 82274 ASSAY TEST FOR BLOOD FECAL: CPT | Performed by: FAMILY MEDICINE

## 2019-11-05 NOTE — LETTER
[address]  [address]  [phone]    November 12, 2019      Enrique Pruitt  [address] [place] [id number]      Dear Enrique,    Your stool test was normal - no evidence of blood.  This is a screening test for colon cancer - and though this is not as thorough as a full colonoscopy it has been found to be accurate in detecting concerning colon lesions.  We should repeat this annually.       TIMMY Peguero M.D.       Results for orders placed or performed in visit on 11/05/19   Fecal colorectal cancer screen (FIT)     Status: None   Result Value Ref Range    Occult Blood Scn FIT Negative NEG^Negative

## 2019-11-12 LAB — HEMOCCULT STL QL IA: NEGATIVE

## 2019-11-12 NOTE — RESULT ENCOUNTER NOTE
Please mail results and note to patient:    Enrique,  Your stool test was normal - no evidence of blood.  This is a screening test for colon cancer - and though this is not as thorough as a full colonoscopy it has been found to be accurate in detecting concerning colon lesions.  We should repeat this annually.   TIMMY Peguero M.D.

## 2019-11-19 DIAGNOSIS — E11.42 DIABETIC POLYNEUROPATHY ASSOCIATED WITH TYPE 2 DIABETES MELLITUS (H): ICD-10-CM

## 2019-11-19 NOTE — TELEPHONE ENCOUNTER
Reason for Call:  Medication or medication refill:    Do you use a [facility]?  Name of the pharmacy and phone number for the current request:  [id number] - [facility] [address] [place] [address] AT [facility]      Name of the medication requested: HYDROcodone-acetaminophen (NORCO) 5-325 MG tablet    Can we leave a detailed message on this number? YES    Phone number patient can be reached at: Home number on file [phone] (home)   Best Time: anytime    Call taken on 11/19/2019 at 11:29 AM by Chano Deleon

## 2019-11-19 NOTE — TELEPHONE ENCOUNTER
Requested Prescriptions   Pending Prescriptions Disp Refills     HYDROcodone-acetaminophen (NORCO) 5-325 MG tablet  Last Written Prescription Date:  10/30/19  Last Fill Quantity: 40 tablet,  # refills: 0   Last office visit: 9/23/2019 with prescribing provider:  Dr. Peguero   Future Office Visit:    Routing refill request to provider for review/approval because:  Drug not on the [facility], UMP or  Health refill protocol or controlled substance   40 tablet 0     Sig: TAKE 1 TO 2 TABLETS BY MOUTH 3 TIMES DAILY AS NEEDED FOR MODERATE TO SVERE PAIN       There is no refill protocol information for this order

## 2019-11-20 RX ORDER — HYDROCODONE BITARTRATE AND ACETAMINOPHEN 5; 325 MG/1; MG/1
TABLET ORAL
Qty: 40 TABLET | Refills: 0 | Status: SHIPPED | OUTPATIENT
Start: 2019-11-20 | End: 2019-12-16

## 2019-11-20 NOTE — TELEPHONE ENCOUNTER
Controlled Substance Refill Request for norco  Problem List Complete:  Yes  Overview Addendum 10/30/2019 12:53 PM by Miki Nova RN   Patient is followed by Lana Peguero MD for ongoing prescription of pain medication.  All refills should be approved by this provider, or covering partner.     Medication(s): vicodin 5.   Maximum quantity per month: 40-60 = < 90 MED  Narcan n/a  Clinic visit frequency required: Q 6  months      Controlled substance agreement: 5/17/19  Encounter-Level CSA - 04/25/2017:    Controlled Substance Agreement - Scan on 5/2/2017  8:46 AM: CONTROLLED SUBSTANCE AGREEMENT (below)         Patient-Level CSA:    Controlled Substance Agreement - Opioid - Scan on 5/20/2019  6:59 AM (below)            Pain Clinic evaluation in the past: No     DIRE Total Score(s):  No flowsheet data found.     Last [facility] website verification: 10/30/19        Mireille Cuba RN, [facility]

## 2019-12-05 ENCOUNTER — PATIENT OUTREACH (OUTPATIENT)
Dept: GERIATRIC MEDICINE | Facility: CLINIC | Age: 74
End: 2019-12-05

## 2019-12-05 NOTE — PROGRESS NOTES
[facility] Care Coordination Contact    Called member and adult daughter Jaleel to complete six month assessment and left a message requesting a return call.    Regine Stapleton RN, PHN  [facility]  [phone]

## 2019-12-10 ENCOUNTER — PATIENT OUTREACH (OUTPATIENT)
Dept: GERIATRIC MEDICINE | Facility: CLINIC | Age: 74
End: 2019-12-10

## 2019-12-10 NOTE — PROGRESS NOTES
[facility] Care Coordination Contact      [facility] Six-Month Telephone Assessment    6 month telephone assessment completed on 12/10/19.    ER visits: No  Hospitalizations: No  TCU stays: No  Significant health status changes: NA  Falls/Injuries: No  ADL/IADL changes: No  Changes in services: No    Caregiver Assessment follow up:  NA    Goals: See POC in chart for goal progress documentation.      Will see member in 6 months for an annual health risk assessment.   Encouraged member to call CC with any questions or concerns in the meantime.       Regine Stapleton RN, PHN  [facility]  [phone]

## 2019-12-16 ENCOUNTER — TELEPHONE (OUTPATIENT)
Dept: FAMILY MEDICINE | Facility: CLINIC | Age: 74
End: 2019-12-16

## 2019-12-16 DIAGNOSIS — E11.42 DIABETIC POLYNEUROPATHY ASSOCIATED WITH TYPE 2 DIABETES MELLITUS (H): ICD-10-CM

## 2019-12-16 RX ORDER — HYDROCODONE BITARTRATE AND ACETAMINOPHEN 5; 325 MG/1; MG/1
1 TABLET ORAL 3 TIMES DAILY PRN
Qty: 40 TABLET | Refills: 0 | Status: SHIPPED | OUTPATIENT
Start: 2019-12-16 | End: 2020-01-23

## 2019-12-16 NOTE — TELEPHONE ENCOUNTER
Controlled Substance Refill Request for Norco  Problem List Complete:  Yes   checked in past 3 months?  Yes Last [facility] website verification: 10/30/19      Last office visit:  09/23/2019    Last refill:  HYDROcodone-acetaminophen (NORCO) 5-325 MG tablet 40 tablet 0 11/20/2019  No   Sig: TAKE 1 TO 2 TABLETS BY MOUTH 3 TIMES DAILY AS NEEDED FOR MODERATE TO SVERE PAIN   Sent to pharmacy as: HYDROcodone-acetaminophen (NORCO) 5-325 MG tablet       Shannan Vail RN

## 2020-01-13 ENCOUNTER — TELEPHONE (OUTPATIENT)
Dept: FAMILY MEDICINE | Facility: CLINIC | Age: 75
End: 2020-01-13

## 2020-01-13 DIAGNOSIS — E11.42 DIABETIC POLYNEUROPATHY ASSOCIATED WITH TYPE 2 DIABETES MELLITUS (H): ICD-10-CM

## 2020-01-13 NOTE — TELEPHONE ENCOUNTER
Requested Prescriptions   Pending Prescriptions Disp Refills     HYDROcodone-acetaminophen (NORCO) 5-325 MG tablet 40 tablet 0     Sig: Take 1 tablet by mouth 3 times daily as needed for severe pain Needs to be seen for any further refill.       There is no refill protocol information for this order          HYDROcodone-acetaminophen (NORCO) 5-325 MG tablet      Last Written Prescription Date:  12/16/19  Last Fill Quantity: 40,   # refills: 0  Last Office Visit: 9/23/19  Future Office visit:       Routing refill request to provider for review/approval because:  Drug not on the [facility], [facility] or [facility] refill protocol or controlled substance

## 2020-01-13 NOTE — TELEPHONE ENCOUNTER
Pending Prescriptions:                       Disp   Refills    HYDROcodone-acetaminophen (NORCO) 5-325 M*40 tab*0            Sig: Take 1 tablet by mouth 3 times daily as needed           for severe pain Needs to be seen for any further           refill.

## 2020-01-13 NOTE — TELEPHONE ENCOUNTER
Reason for Call:  Medication or medication refill:    Do you use a [facility]?  Name of the pharmacy and phone number for the current request:  [id number] - [facility] [address] [place] [address] AT [facility]    Name of the medication requested: HYDROcodone-acetaminophen (NORCO) 5-325 MG tablet    Other request: None    Can we leave a detailed message on this number? YES    Phone number patient can be reached at: Cell number on file:    Telephone Information:   Mobile [phone]       Best Time: Any    Call taken on 1/13/2020 at 2:55 PM by Abdirizak Johnson

## 2020-01-13 NOTE — TELEPHONE ENCOUNTER
Routing refill request to provider for review/approval because:  Mi given x1 and patient did not follow up, please advise.    Mireille Cuba RN, [facility]

## 2020-01-18 DIAGNOSIS — H04.123 DRY EYES: ICD-10-CM

## 2020-01-18 DIAGNOSIS — F51.04 CHRONIC INSOMNIA: ICD-10-CM

## 2020-01-18 DIAGNOSIS — E78.5 HYPERLIPIDEMIA LDL GOAL <70: ICD-10-CM

## 2020-01-18 DIAGNOSIS — E11.42 TYPE 2 DIABETES MELLITUS WITH DIABETIC POLYNEUROPATHY, WITHOUT LONG-TERM CURRENT USE OF INSULIN (H): ICD-10-CM

## 2020-01-18 DIAGNOSIS — L30.9 DERMATITIS: ICD-10-CM

## 2020-01-20 ENCOUNTER — TELEPHONE (OUTPATIENT)
Dept: FAMILY MEDICINE | Facility: CLINIC | Age: 75
End: 2020-01-20

## 2020-01-20 DIAGNOSIS — E11.42 DIABETIC POLYNEUROPATHY ASSOCIATED WITH TYPE 2 DIABETES MELLITUS (H): ICD-10-CM

## 2020-01-20 DIAGNOSIS — E11.42 TYPE 2 DIABETES MELLITUS WITH DIABETIC POLYNEUROPATHY, WITHOUT LONG-TERM CURRENT USE OF INSULIN (H): ICD-10-CM

## 2020-01-20 DIAGNOSIS — I10 ESSENTIAL HYPERTENSION WITH GOAL BLOOD PRESSURE LESS THAN 140/90: ICD-10-CM

## 2020-01-20 RX ORDER — PREGABALIN 100 MG/1
100 CAPSULE ORAL 2 TIMES DAILY
Qty: 60 CAPSULE | Refills: 0 | Status: SHIPPED | OUTPATIENT
Start: 2020-01-20 | End: 2020-02-11

## 2020-01-20 RX ORDER — AMLODIPINE BESYLATE 10 MG/1
10 TABLET ORAL DAILY
Qty: 90 TABLET | Refills: 1 | Status: SHIPPED | OUTPATIENT
Start: 2020-01-20 | End: 2020-06-05

## 2020-01-20 RX ORDER — PIOGLITAZONEHYDROCHLORIDE 45 MG/1
45 TABLET ORAL DAILY
Qty: 90 TABLET | Refills: 1 | Status: SHIPPED | OUTPATIENT
Start: 2020-01-20 | End: 2020-07-14

## 2020-01-20 NOTE — TELEPHONE ENCOUNTER
Prescription approved per [facility] Refill Protocol.  /Amie Schilling RN on 1/20/2020 at 3:41 PM

## 2020-01-20 NOTE — TELEPHONE ENCOUNTER
1 of 2 medications approved.       Routing refill request to provider for review/approval because:  Drug not on the [facility] or [facility] refill protocol or controlled substance

## 2020-01-20 NOTE — TELEPHONE ENCOUNTER
Requested Prescriptions   Pending Prescriptions Disp Refills     pregabalin (LYRICA) 100 MG capsule 60 capsule 0     Sig: Take 1 capsule (100 mg) by mouth 2 times daily       There is no refill protocol information for this order        pioglitazone (ACTOS) 45 MG tablet 90 tablet 1     Sig: Take 1 tablet (45 mg) by mouth daily       Thiazolidinedione Agents (TZDs)  Passed - 1/20/2020 11:08 AM        Passed - Blood pressure less than 140/90 in past 6 months     BP Readings from Last 3 Encounters:   09/23/19 106/62   05/17/19 104/66   10/17/18 102/58                 Passed - Patient has documented LDL within the past 12 mos.     Recent Labs   Lab Test 09/23/19  1533   *             Passed - Patient has a normal ALT within the past 12 mos.     Recent Labs   Lab Test 05/17/19  1642   ALT 14             Passed - Patient has a normal AST within the past 12 mos.      Recent Labs   Lab Test 05/17/19  1642   AST 8             Passed - Patient has had a Microalbumin in the past 12 mos.     Recent Labs   Lab Test 09/23/19  1540   MICROL 63   UMALCR 34.40*             Passed - Patient has documented A1c within the specified period of time.     If HgbA1C is 8 or greater, it needs to be on file within the past 3 months.  If less than 8, must be on file within the past 6 months.     Recent Labs   Lab Test 09/23/19  1533   A1C 7.1*             Passed - Diagnosis not CHF        Passed - Medication is active on med list        Passed - Patient is age 18 or older        Passed - Patient is not pregnant        Passed - Patient has a normal serum Creatinine in the past 12 months     Recent Labs   Lab Test 09/23/19  1533   CR 0.85             Passed - Patient has not had a positive pregnancy test within the past 12 mos.        Passed - Recent (6 mo) or future (30 days) visit within the authorizing provider's specialty     Patient had office visit in the last 6 months or has a visit in the next 30 days with authorizing provider or  "within the authorizing provider's specialty.  See \"Patient Info\" tab in inbasket, or \"Choose Columns\" in Meds & Orders section of the refill encounter.            pregabalin (LYRICA) 100 MG capsule  Last Written Prescription Date:  4/4/19  Last Fill Quantity: 60,  # refills: 0   Last office visit: 9/23/2019 with prescribing provider:  Dr. Peguero   Future Office Visit:      pioglitazone (ACTOS) 45 MG tablet  Last Written Prescription Date:  9/23/19  Last Fill Quantity: 90,  # refills: 1   Last office visit: 9/23/2019 with prescribing provider:  Dr. Peguero   Future Office Visit:      "

## 2020-01-20 NOTE — TELEPHONE ENCOUNTER
"Requested Prescriptions   Pending Prescriptions Disp Refills     amLODIPine (NORVASC) 10 MG tablet 90 tablet 1     Sig: Take 1 tablet (10 mg) by mouth daily       Calcium Channel Blockers Protocol  Passed - 1/20/2020  9:35 AM        Passed - Blood pressure under 140/90 in past 12 months     BP Readings from Last 3 Encounters:   09/23/19 106/62   05/17/19 104/66   10/17/18 102/58                 Passed - Recent (12 mo) or future (30 days) visit within the authorizing provider's specialty     Patient has had an office visit with the authorizing provider or a provider within the authorizing providers department within the previous 12 mos or has a future within next 30 days. See \"Patient Info\" tab in inbasket, or \"Choose Columns\" in Meds & Orders section of the refill encounter.              Passed - Medication is active on med list        Passed - Patient is age 18 or older        Passed - No active pregnancy on record        Passed - Normal serum creatinine on file in past 12 months     Recent Labs   Lab Test 09/23/19  1533   CR 0.85             Passed - No positive pregnancy test in past 12 months        amLODIPine (NORVASC) 10 MG tablet  Last Written Prescription Date:  9/23/19  Last Fill Quantity: 90,  # refills: 1   Last office visit: 9/23/2019 with prescribing provider:  Clarisse Wiseman   Future Office Visit:      "

## 2020-01-20 NOTE — TELEPHONE ENCOUNTER
"Requested Prescriptions   Pending Prescriptions Disp Refills     triamcinolone (KENALOG) 0.1 % external cream [Pharmacy Med Name: TRIAMCINOLONE 0.1% CREAM 80GM] 80 g 0     Sig: APPLY EXTERNALLY TO THE AFFECTED AREA TWICE DAILY AS NEEDED FOR IRRITATION       Topical Steroids and Nonsteroidals Protocol Passed - 1/18/2020 11:11 AM        Passed - Patient is age 6 or older        Passed - Authorizing prescriber's most recent note related to this medication read.     If refill request is for ophthalmic use, please forward request to provider for approval.          Passed - High potency steroid not ordered        Passed - Recent (12 mo) or future (30 days) visit within the authorizing provider's specialty     Patient has had an office visit with the authorizing provider or a provider within the authorizing providers department within the previous 12 mos or has a future within next 30 days. See \"Patient Info\" tab in inbasket, or \"Choose Columns\" in Meds & Orders section of the refill encounter.              Passed - Medication is active on med list        glimepiride (AMARYL) 4 MG tablet [Pharmacy Med Name: GLIMEPIRIDE 4MG TABLETS] 60 tablet      Sig: TAKE 1 TABLET BY MOUTH TWICE DAILY       Sulfonylurea Agents Passed - 1/18/2020 11:11 AM        Passed - Blood pressure less than 140/90 in past 6 months     BP Readings from Last 3 Encounters:   09/23/19 106/62   05/17/19 104/66   10/17/18 102/58                 Passed - Patient has documented LDL within the past 12 mos.     Recent Labs   Lab Test 09/23/19  1533   *             Passed - Patient has had a Microalbumin in the past 15 mos.     Recent Labs   Lab Test 09/23/19  1540   MICROL 63   UMALCR 34.40*             Passed - Patient has documented A1c within the specified period of time.     If HgbA1C is 8 or greater, it needs to be on file within the past 3 months.  If less than 8, must be on file within the past 6 months.     Recent Labs   Lab Test 09/23/19  1533 " "  A1C 7.1*             Passed - Medication is active on med list        Passed - Patient is age 18 or older        Passed - No active pregnancy on record        Passed - Patient has a recent creatinine (normal) within the past 12 mos.     Recent Labs   Lab Test 09/23/19  1533   CR 0.85             Passed - Patient has not had a positive pregnancy test within the past 12 mos.        Passed - Recent (6 mo) or future (30 days) visit within the authorizing provider's specialty     Patient had office visit in the last 6 months or has a visit in the next 30 days with authorizing provider or within the authorizing provider's specialty.  See \"Patient Info\" tab in inbasket, or \"Choose Columns\" in Meds & Orders section of the refill encounter.            ROZEREM 8 MG tablet [Pharmacy Med Name: ROZEREM 8MG TABLETS] 30 tablet 5     Sig: TAKE 1 TABLET BY MOUTH AT BEDTIME       There is no refill protocol information for this order        metFORMIN (GLUCOPHAGE) 500 MG tablet [Pharmacy Med Name: METFORMIN 500MG TABLETS] 120 tablet      Sig: TAKE 2 TABLETS BY MOUTH TWICE DAILY WITH MEALS       Biguanide Agents Passed - 1/18/2020 11:11 AM        Passed - Blood pressure less than 140/90 in past 6 months     BP Readings from Last 3 Encounters:   09/23/19 106/62   05/17/19 104/66   10/17/18 102/58                 Passed - Patient has documented LDL within the past 12 mos.     Recent Labs   Lab Test 09/23/19  1533   *             Passed - Patient has had a Microalbumin in the past 15 mos.     Recent Labs   Lab Test 09/23/19  1540   MICROL 63   UMALCR 34.40*             Passed - Patient is age 10 or older        Passed - Patient has documented A1c within the specified period of time.     If HgbA1C is 8 or greater, it needs to be on file within the past 3 months.  If less than 8, must be on file within the past 6 months.     Recent Labs   Lab Test 09/23/19  1533   A1C 7.1*             Passed - Patient's CR is NOT>1.4 OR Patient's " "EGFR is NOT<45 within past 12 mos.     Recent Labs   Lab Test 09/23/19  1533   GFRESTIMATED 66   GFRESTBLACK 77       Recent Labs   Lab Test 09/23/19  1533   CR 0.85             Passed - Patient does NOT have a diagnosis of CHF.        Passed - Medication is active on med list        Passed - Patient is not pregnant        Passed - Patient has not had a positive pregnancy test within the past 12 mos.         Passed - Recent (6 mo) or future (30 days) visit within the authorizing provider's specialty     Patient had office visit in the last 6 months or has a visit in the next 30 days with authorizing provider or within the authorizing provider's specialty.  See \"Patient Info\" tab in inbasket, or \"Choose Columns\" in Meds & Orders section of the refill encounter.            JANUVIA 50 MG tablet [Pharmacy Med Name: JANUVIA 50MG TABLETS] 30 tablet      Sig: TAKE 1 TABLET BY MOUTH DAILY       DPP4 Inhibitors Protocol Passed - 1/18/2020 11:11 AM        Passed - Blood pressure less than 140/90 in past 6 months     BP Readings from Last 3 Encounters:   09/23/19 106/62   05/17/19 104/66   10/17/18 102/58                 Passed - LDL on file in past 12 months     Recent Labs   Lab Test 09/23/19  1533   *             Passed - Microalbumin on file in past 12 months     Recent Labs   Lab Test 09/23/19  1540   MICROL 63   UMALCR 34.40*             Passed - HgbA1C in past 3 or 6 months     If HgbA1C is 8 or greater, it needs to be on file within the past 3 months.  If less than 8, must be on file within the past 6 months.     Recent Labs   Lab Test 09/23/19  1533   A1C 7.1*             Passed - Medication is active on med list        Passed - Patient is age 18 or older        Passed - No active pregnancy on record        Passed - Normal serum creatinine in past 12 months     Recent Labs   Lab Test 09/23/19  1533   CR 0.85             Passed - No positive pregnancy test in past 12 months        Passed - Recent (6 mo) or " "future (30 days) visit within the authorizing provider's specialty     Patient had office visit in the last 6 months or has a visit in the next 30 days with authorizing provider.  See \"Patient Info\" tab in inbasket, or \"Choose Columns\" in Meds & Orders section of the refill encounter.            simvastatin (ZOCOR) 20 MG tablet [Pharmacy Med Name: SIMVASTATIN 20MG TABLETS] 90 tablet 1     Sig: TAKE 1 TABLET(20 MG) BY MOUTH DAILY       Statins Protocol Passed - 1/18/2020 11:11 AM        Passed - LDL on file in past 12 months     Recent Labs   Lab Test 09/23/19  1533   *             Passed - No abnormal creatine kinase in past 12 months     No lab results found.             Passed - Recent (12 mo) or future (30 days) visit within the authorizing provider's specialty     Patient has had an office visit with the authorizing provider or a provider within the authorizing providers department within the previous 12 mos or has a future within next 30 days. See \"Patient Info\" tab in inBuzzillaet, or \"Choose Columns\" in Meds & Orders section of the refill encounter.              Passed - Medication is active on med list        Passed - Patient is age 18 or older        Passed - No active pregnancy on record        Passed - No positive pregnancy test in past 12 months        ARTIFICIAL TEARS 1.4 % ophthalmic solution [Pharmacy Med Name: ARTIFICIAL TEARS OPHTH SOLN 15ML] 15 mL      Sig: INSTILL 1 DROP IN BOTH EYES FOUR TIMES DAILY       There is no refill protocol information for this order        triamcinolone (KENALOG) 0.1 % external cream  Last Written Prescription Date:  10/9/19  Last Fill Quantity: 80g,  # refills: 0   Last office visit: 9/23/2019 with prescribing provider:  Dr. Peguero   Future Office Visit:        glimepiride (AMARYL) 4 MG tablet  Last Written Prescription Date:  9/23/19  Last Fill Quantity: 180,  # refills: 1   Last office visit: 9/23/2019 with prescribing provider:  Dr. Peguero   Future Office " Visit:        ROZEREM 8 MG tablet  Last Written Prescription Date:  9/10/18  Last Fill Quantity: 30,  # refills: 5   Last office visit: 9/23/2019 with prescribing provider:  Dr. Marielena Salvador Office Visit:        metFORMIN (GLUCOPHAGE) 500 MG tablet  Last Written Prescription Date:  9/23/19  Last Fill Quantity: 360,  # refills: 1   Last office visit: 9/23/2019 with prescribing provider:  Dr. Marielena Salvador Office Visit:        JANUVIA 50 MG tablet  Last Written Prescription Date:  9/23/19  Last Fill Quantity: 90,  # refills: 1   Last office visit: 9/23/2019 with prescribing provider:  Dr. Marielena Salvador Office Visit:        simvastatin (ZOCOR) 20 MG tablet  Last Written Prescription Date:  9/23/19  Last Fill Quantity: 90,  # refills: 1   Last office visit: 9/23/2019 with prescribing provider:  Dr. Marielena Salvador Office Visit:        ARTIFICIAL TEARS 1.4 % ophthalmic solution  Last Written Prescription Date:  1/9/19  Last Fill Quantity: 6ml,  # refills: 12   Last office visit: 9/23/2019 with prescribing provider:  Dr. Marielena Salvador Office Visit:

## 2020-01-20 NOTE — TELEPHONE ENCOUNTER
PA for Ordoro CONTOUR test strip    Phone# [phone]    ID# [id number]    PA or alternative    Sheri Gonzalez

## 2020-01-21 RX ORDER — TRIAMCINOLONE ACETONIDE 1 MG/G
CREAM TOPICAL
Qty: 80 G | Refills: 0 | Status: SHIPPED | OUTPATIENT
Start: 2020-01-21 | End: 2020-05-05

## 2020-01-21 RX ORDER — SITAGLIPTIN 50 MG/1
TABLET, FILM COATED ORAL
Qty: 30 TABLET | Refills: 1 | Status: SHIPPED | OUTPATIENT
Start: 2020-01-21 | End: 2020-02-11

## 2020-01-21 RX ORDER — SIMVASTATIN 20 MG
TABLET ORAL
Qty: 90 TABLET | Refills: 1 | Status: SHIPPED | OUTPATIENT
Start: 2020-01-21 | End: 2020-06-05

## 2020-01-21 RX ORDER — GLIMEPIRIDE 4 MG/1
TABLET ORAL
Qty: 60 TABLET | Refills: 0 | Status: SHIPPED | OUTPATIENT
Start: 2020-01-21 | End: 2020-02-11

## 2020-01-21 RX ORDER — POLYVINYL ALCOHOL 14 MG/ML
SOLUTION/ DROPS OPHTHALMIC
Qty: 15 ML | Refills: 1 | Status: SHIPPED | OUTPATIENT
Start: 2020-01-21 | End: 2020-01-23

## 2020-01-21 RX ORDER — RAMELTEON 8 MG/1
TABLET, FILM COATED ORAL
Qty: 30 TABLET | Refills: 5 | Status: SHIPPED | OUTPATIENT
Start: 2020-01-21 | End: 2020-07-14

## 2020-01-21 NOTE — TELEPHONE ENCOUNTER
Prescription approved per [facility] Refill Protocol.  Amie Schilling RN on 1/21/2020 at 1:09 PM

## 2020-01-22 ENCOUNTER — TELEPHONE (OUTPATIENT)
Dept: FAMILY MEDICINE | Facility: CLINIC | Age: 75
End: 2020-01-22

## 2020-01-22 DIAGNOSIS — E11.42 TYPE 2 DIABETES MELLITUS WITH DIABETIC POLYNEUROPATHY, WITHOUT LONG-TERM CURRENT USE OF INSULIN (H): ICD-10-CM

## 2020-01-22 DIAGNOSIS — H04.123 DRY EYES: ICD-10-CM

## 2020-01-22 NOTE — TELEPHONE ENCOUNTER
**Equipment being ordered: Test strips and lancets - per device and insurance plan    Requested Prescriptions   Pending Prescriptions Disp Refills     order for DME 1 Device 0     Sig: Equipment being ordered: glucometer - One Touch ultra or per insurance   Test daily       There is no refill protocol information for this order        order for DME  Last Written Prescription Date:  1/21/2020  Last Fill Quantity: 1,  # refills: 0   Last office visit: 9/23/2019 with prescribing provider:  Dr. Peguero   Future Office Visit:

## 2020-01-22 NOTE — TELEPHONE ENCOUNTER
Message from [facility] [phone]    ARTIFICIAL TEARS 1.4 % ophthalmic solution no longer being made, please send an alternative    Sheri Gonzalez

## 2020-01-23 RX ORDER — HYDROCODONE BITARTRATE AND ACETAMINOPHEN 5; 325 MG/1; MG/1
1 TABLET ORAL 3 TIMES DAILY PRN
Qty: 40 TABLET | Refills: 0 | Status: SHIPPED | OUTPATIENT
Start: 2020-01-23 | End: 2020-02-11

## 2020-01-23 NOTE — TELEPHONE ENCOUNTER
appt scheduled for 2/5/2020.  Pt wondering if she can get hydrocodone filled now.    Luba CAR, Patient Care

## 2020-01-23 NOTE — TELEPHONE ENCOUNTER
Reason for Call:  Other prescription    Detailed comments: Oudomphone calling to state that Pt did make a follow up apt with Dr. Peguero regarding refill although it does not state on chart that she made a recent follow up apt . She still insists that  Dr. Peguero send Rx to Pt's Pharmacy as soon as possible. They would like a call to confirm Rx has been sent.    Phone Number Patient can be reached at: Home number on file [phone] (home)    Best Time: anytime    Can we leave a detailed message on this number? YES    Call taken on 1/23/2020 at 10:55 AM by Chano Deleon

## 2020-01-28 DIAGNOSIS — E11.42 DIABETIC POLYNEUROPATHY ASSOCIATED WITH TYPE 2 DIABETES MELLITUS (H): ICD-10-CM

## 2020-01-28 DIAGNOSIS — G89.4 CHRONIC PAIN SYNDROME: ICD-10-CM

## 2020-01-28 NOTE — TELEPHONE ENCOUNTER
Requested Prescriptions   Pending Prescriptions Disp Refills     HYDROcodone-acetaminophen (NORCO) 5-325 MG tablet        Last Written Prescription Date:  1/23/20  Last Fill Quantity: 40 tablet,   # refills: 0  Last Office Visit: Dr. Peguero  Future Office visit:    Next 5 appointments (look out 90 days)    Feb 11, 2020 12:40 PM CST  Office Visit with Lana [facility]ero MD  [facility] [address] [place] [id number] [phone]           Routing refill request to provider for review/approval because:  Drug not on the [facility], UMP or  Health refill protocol or controlled substance   40 tablet 0     Sig: Take 1 tablet by mouth 3 times daily as needed for severe pain Needs to be seen for any further refill.       There is no refill protocol information for this order

## 2020-01-28 NOTE — TELEPHONE ENCOUNTER
Reason for Call:  Medication or medication refill:    Do you use a [facility]?  Name of the pharmacy and phone number for the current request:  [id number] - [facility] [address] [place] [address] AT [facility]  [phone]    Name of the medication requested: HYDROcodone-acetaminophen (NORCO) 5-325 MG tablet    Other request: Needed to reschedule an appointment and will run out before being seen.     Can we leave a detailed message on this number? YES    Phone number patient can be reached at: Home number on file [phone] (home)    Best Time:     Call taken on 1/28/2020 at 10:07 AM by Ninfa Parish

## 2020-01-30 RX ORDER — HYDROCODONE BITARTRATE AND ACETAMINOPHEN 5; 325 MG/1; MG/1
1 TABLET ORAL 3 TIMES DAILY PRN
Qty: 40 TABLET | Refills: 0 | OUTPATIENT
Start: 2020-01-30

## 2020-01-30 NOTE — TELEPHONE ENCOUNTER
Controlled Substance Refill Request for Norco  Problem List Complete:  Yes  Chronic pain syndrome   Problem Detail     Noted:  12/14/2016   Priority:  Medium   Overview Addendum 1/30/2020  2:28 PM by Miki Nova RN   Patient is followed by Lana Peguero MD for ongoing prescription of pain medication.  All refills should be approved by this provider, or covering partner.     Medication(s): vicodin 5.   Maximum quantity per month: 40-60 = < 90 MED  Narcan n/a  Clinic visit frequency required: Q 6  months      Controlled substance agreement: 5/17/19  Encounter-Level CSA - 04/25/2017:    Controlled Substance Agreement - Scan on 5/2/2017  8:46 AM: CONTROLLED SUBSTANCE AGREEMENT (below)         Patient-Level CSA:    Controlled Substance Agreement - Opioid - Scan on 5/20/2019  6:59 AM (below)            Pain Clinic evaluation in the past: No     DIRE Total Score(s):  No flowsheet data found.     Last MNP website verification: 01/30/20    [email or web address]       checked in past 3 months?  Yes 1/30/20  Last Written Prescription Date:  1/23/20  Last Fill Quantity: 40 tablets  # refills: 0   Last office visit: 9/23/2019 with prescribing provider:  9/23/19   Future Office Visit:   Next 5 appointments (look out 90 days)    Feb 11, 2020 12:40 PM CST  Office Visit with Lana [facility]ero MD  [facility] [address] [place] [id number] [phone]         RX monitoring program ([place]) reviewed:  reviewed- no concerns    [place] profile:  [email or web address]      Miki Nova RN, BSN, PHN

## 2020-02-11 ENCOUNTER — OFFICE VISIT (OUTPATIENT)
Dept: FAMILY MEDICINE | Facility: CLINIC | Age: 75
End: 2020-02-11
Payer: COMMERCIAL

## 2020-02-11 ENCOUNTER — ANCILLARY PROCEDURE (OUTPATIENT)
Dept: GENERAL RADIOLOGY | Facility: CLINIC | Age: 75
End: 2020-02-11
Attending: FAMILY MEDICINE
Payer: COMMERCIAL

## 2020-02-11 VITALS
HEART RATE: 134 BPM | OXYGEN SATURATION: 96 % | HEIGHT: 57 IN | DIASTOLIC BLOOD PRESSURE: 71 MMHG | TEMPERATURE: 98.1 F | SYSTOLIC BLOOD PRESSURE: 105 MMHG | BODY MASS INDEX: 29.34 KG/M2 | WEIGHT: 136 LBS

## 2020-02-11 DIAGNOSIS — E11.42 DIABETIC POLYNEUROPATHY ASSOCIATED WITH TYPE 2 DIABETES MELLITUS (H): ICD-10-CM

## 2020-02-11 DIAGNOSIS — E11.42 TYPE 2 DIABETES MELLITUS WITH DIABETIC POLYNEUROPATHY, WITHOUT LONG-TERM CURRENT USE OF INSULIN (H): ICD-10-CM

## 2020-02-11 DIAGNOSIS — J18.9 PNEUMONIA OF LEFT LOWER LOBE DUE TO INFECTIOUS ORGANISM: Primary | ICD-10-CM

## 2020-02-11 DIAGNOSIS — N18.30 CKD (CHRONIC KIDNEY DISEASE) STAGE 3, GFR 30-59 ML/MIN (H): ICD-10-CM

## 2020-02-11 DIAGNOSIS — R05.9 COUGH: ICD-10-CM

## 2020-02-11 DIAGNOSIS — G89.4 CHRONIC PAIN SYNDROME: ICD-10-CM

## 2020-02-11 LAB
ALBUMIN SERPL-MCNC: 3 G/DL (ref 3.4–5)
ALP SERPL-CCNC: 100 U/L (ref 40–150)
ALT SERPL W P-5'-P-CCNC: 14 U/L (ref 0–50)
ANION GAP SERPL CALCULATED.3IONS-SCNC: 9 MMOL/L (ref 3–14)
AST SERPL W P-5'-P-CCNC: 9 U/L (ref 0–45)
BILIRUB SERPL-MCNC: 0.4 MG/DL (ref 0.2–1.3)
BUN SERPL-MCNC: 30 MG/DL (ref 7–30)
CALCIUM SERPL-MCNC: 9.2 MG/DL (ref 8.5–10.1)
CHLORIDE SERPL-SCNC: 103 MMOL/L (ref 94–109)
CHOLEST SERPL-MCNC: 150 MG/DL
CO2 SERPL-SCNC: 22 MMOL/L (ref 20–32)
CREAT SERPL-MCNC: 1.01 MG/DL (ref 0.52–1.04)
GFR SERPL CREATININE-BSD FRML MDRD: 54 ML/MIN/{1.73_M2}
GLUCOSE SERPL-MCNC: 281 MG/DL (ref 70–99)
HBA1C MFR BLD: 9.6 % (ref 0–5.6)
HDLC SERPL-MCNC: 65 MG/DL
LDLC SERPL CALC-MCNC: 44 MG/DL
NONHDLC SERPL-MCNC: 85 MG/DL
POTASSIUM SERPL-SCNC: 4.5 MMOL/L (ref 3.4–5.3)
PROT SERPL-MCNC: 8 G/DL (ref 6.8–8.8)
SODIUM SERPL-SCNC: 134 MMOL/L (ref 133–144)
T4 FREE SERPL-MCNC: 1.89 NG/DL (ref 0.76–1.46)
TRIGL SERPL-MCNC: 207 MG/DL
TSH SERPL DL<=0.005 MIU/L-ACNC: 0.36 MU/L (ref 0.4–4)

## 2020-02-11 PROCEDURE — 84443 ASSAY THYROID STIM HORMONE: CPT | Performed by: FAMILY MEDICINE

## 2020-02-11 PROCEDURE — 99214 OFFICE O/P EST MOD 30 MIN: CPT | Performed by: FAMILY MEDICINE

## 2020-02-11 PROCEDURE — 71046 X-RAY EXAM CHEST 2 VIEWS: CPT | Mod: FY

## 2020-02-11 PROCEDURE — 80053 COMPREHEN METABOLIC PANEL: CPT | Performed by: FAMILY MEDICINE

## 2020-02-11 PROCEDURE — 36415 COLL VENOUS BLD VENIPUNCTURE: CPT | Performed by: FAMILY MEDICINE

## 2020-02-11 PROCEDURE — 80061 LIPID PANEL: CPT | Performed by: FAMILY MEDICINE

## 2020-02-11 PROCEDURE — 83036 HEMOGLOBIN GLYCOSYLATED A1C: CPT | Performed by: FAMILY MEDICINE

## 2020-02-11 PROCEDURE — 84439 ASSAY OF FREE THYROXINE: CPT | Performed by: FAMILY MEDICINE

## 2020-02-11 RX ORDER — PREGABALIN 100 MG/1
100 CAPSULE ORAL 2 TIMES DAILY
Qty: 180 CAPSULE | Refills: 1 | Status: SHIPPED | OUTPATIENT
Start: 2020-02-11 | End: 2020-06-05

## 2020-02-11 RX ORDER — GLIMEPIRIDE 4 MG/1
TABLET ORAL
Qty: 180 TABLET | Refills: 1 | Status: SHIPPED | OUTPATIENT
Start: 2020-02-11 | End: 2020-07-14

## 2020-02-11 RX ORDER — HYDROCODONE BITARTRATE AND ACETAMINOPHEN 5; 325 MG/1; MG/1
1 TABLET ORAL 3 TIMES DAILY PRN
Qty: 40 TABLET | Refills: 0 | Status: SHIPPED | OUTPATIENT
Start: 2020-02-11 | End: 2020-03-18

## 2020-02-11 ASSESSMENT — MIFFLIN-ST. JEOR: SCORE: 985.77

## 2020-02-11 NOTE — PROGRESS NOTES
"Subjective     Enrique Pruitt is a 74 year old female who presents to clinic today for the following health issues:    HPI   ED/UC Followup:    Facility:  [facility]   Date of visit: 1/30/2020  Reason for visit: SOB, cough and chest pains   Current Status: Patient still having SOB, cough, chest pains, fatigue but has now started having loss of appetite and vomiting when patient eats, patient unable to keep food down. Prednisone helped at first but patient is currently and has not been using Albuterol nebulizer. Patient's daughter requesting chest X-Ray.     SUBJECTIVE:  Here today with daughter originally scheduled for routine follow-up of diabetes and pain medication but with the symptoms as above.  This started a couple of weeks ago and she was seen through [facility] and diagnosed with bronchitis.  Full records reviewed with patient and family and through care everywhere.  Was given nebulizer solution and prednisone.  The prednisone seemed to help a little bit at first but patient continued to cough.  They never filled the neb solution.  Now patient is continuing to cough and just is not eating much.  Feeling lousy.  No fevers or chills but feels tired and rundown.  Otherwise taking diabetes medications etc. as prescribed.  Uses intermittent hydrocodone for some peripheral neuropathy symptoms.  Also on Lyrica.  But because of her age I do not want her using this on a scheduled basis as she has been in the past so we have kept the hydrocodone to a minimum.    Review of systems otherwise negative.  Past medical, family, and social history reviewed and updated in chart.    OBJECTIVE:  /71 (BP Location: Right arm, Patient Position: Chair, Cuff Size: Adult Regular)   Pulse 134   Temp 98.1  F (36.7  C) (Oral)   Ht 1.448 m (4' 9\")   Wt 61.7 kg (136 lb)   LMP  (LMP Unknown)   SpO2 96%   Breastfeeding No   BMI 29.43 kg/m    Alert, pleasant, upbeat, and in no apparent discomfort.  Not dyspneic " at rest but obvious coarse rhonchi are audible  Heart regular rate and rhythm without murmur  No peripheral edema or JVD  Lungs have scattered rhonchi throughout and 4 out of 5 aeration  Past labs reviewed with the patient.   XRAY - my independent reading of the films showed a small infiltrate in the left lower lobe    ASSESSMENT / PLAN:  (J18.1) Pneumonia of left lower lobe due to infectious organism (H)  (primary encounter diagnosis)  Comment: Nontoxic and I think this can be treated as an outpatient.  Discussed mechanism of action of the proposed medication, as well as potential effects, both good and bad.  Patient expressed understanding and agreed with treatment.   Plan: XR Chest 2 Views, amoxicillin-clavulanate         (AUGMENTIN) 875-125 MG tablet            (E11.42) Type 2 diabetes mellitus with diabetic polyneuropathy, without long-term current use of insulin (H)  Comment: Recheck and adjust as needed  Plan: Hemoglobin A1c, Albumin Random Urine         Quantitative with Creat Ratio, Comprehensive         metabolic panel, Lipid panel reflex to direct         LDL Fasting, TSH with free T4 reflex,         glimepiride (AMARYL) 4 MG tablet, sitagliptin         (JANUVIA) 50 MG tablet, metFORMIN (GLUCOPHAGE)         500 MG tablet            (G89.4) Chronic pain syndrome  Comment: Up-to-date on controlled substance agreement and  database monitoring  Plan: Drug Abuse Screen Panel 13, Urine (Pain Care         Package)            (N18.3) CKD (chronic kidney disease) stage 3, GFR 30-59 ml/min (H)  Comment: Continuing to follow  Plan:     (E11.42) Diabetic polyneuropathy associated with type 2 diabetes mellitus  Comment:   Plan: HYDROcodone-acetaminophen (NORCO) 5-325 MG         tablet, pregabalin (LYRICA) 100 MG capsule            Follow up 3 months  TIMMY Peguero MD    (Chart documentation completed in part with Dragon voice-recognition software.  Even though reviewed some grammatical, spelling, and word  errors may remain.)

## 2020-02-12 NOTE — RESULT ENCOUNTER NOTE
Call daughter (and send a copy of labs):  Her sugar is way too high - please make sure she is actually taking her medication.  We need to recheck in like 2-3 months.  And I am going to reduce her thyroid dosage a little as well.    TIMMY Peguero M.D.

## 2020-03-17 ENCOUNTER — TELEPHONE (OUTPATIENT)
Dept: FAMILY MEDICINE | Facility: CLINIC | Age: 75
End: 2020-03-17

## 2020-03-17 NOTE — TELEPHONE ENCOUNTER
Reason for Call:  Other appointment    Detailed comments: Returning the call about 03/18 appointment. Jorgito (son in law) spoke with her and she prefers to still come into the clinic for a face to face appointment.     Phone Number Patient can be reached at: Home number on file [phone] (home)    Best Time: Any    Can we leave a detailed message on this number? YES    Call taken on 3/17/2020 at 11:03 AM by Ninfa Parish

## 2020-03-17 NOTE — TELEPHONE ENCOUNTER
This is for a f/u pneumonia. Pt's daughter says she is not symptomatic. No cough or fever. Informed them of message below per Dr. Marielena Reyes MA

## 2020-03-18 ENCOUNTER — OFFICE VISIT (OUTPATIENT)
Dept: FAMILY MEDICINE | Facility: CLINIC | Age: 75
End: 2020-03-18
Payer: COMMERCIAL

## 2020-03-18 VITALS
TEMPERATURE: 98.4 F | RESPIRATION RATE: 18 BRPM | HEIGHT: 57 IN | HEART RATE: 116 BPM | SYSTOLIC BLOOD PRESSURE: 129 MMHG | OXYGEN SATURATION: 95 % | DIASTOLIC BLOOD PRESSURE: 67 MMHG | BODY MASS INDEX: 26.88 KG/M2 | WEIGHT: 124.6 LBS

## 2020-03-18 DIAGNOSIS — E11.42 DIABETIC POLYNEUROPATHY ASSOCIATED WITH TYPE 2 DIABETES MELLITUS (H): ICD-10-CM

## 2020-03-18 DIAGNOSIS — J18.9 PNEUMONIA OF LEFT LOWER LOBE DUE TO INFECTIOUS ORGANISM: Primary | ICD-10-CM

## 2020-03-18 DIAGNOSIS — E11.42 TYPE 2 DIABETES MELLITUS WITH DIABETIC POLYNEUROPATHY, WITHOUT LONG-TERM CURRENT USE OF INSULIN (H): ICD-10-CM

## 2020-03-18 PROCEDURE — 99213 OFFICE O/P EST LOW 20 MIN: CPT | Performed by: FAMILY MEDICINE

## 2020-03-18 RX ORDER — HYDROCODONE BITARTRATE AND ACETAMINOPHEN 5; 325 MG/1; MG/1
1 TABLET ORAL 3 TIMES DAILY PRN
Qty: 40 TABLET | Refills: 0 | Status: SHIPPED | OUTPATIENT
Start: 2020-03-18 | End: 2020-04-16

## 2020-03-18 ASSESSMENT — MIFFLIN-ST. JEOR: SCORE: 929.06

## 2020-03-18 ASSESSMENT — PAIN SCALES - GENERAL: PAINLEVEL: NO PAIN (0)

## 2020-03-18 NOTE — PROGRESS NOTES
"Subjective     Enrique Pruitt is a 75 year old female who presents to clinic today for the following health issues:    HPI   Follow up Pneumonia  -sometimes pain in chest on the RT side  -no other concerns with it  -over all feeling better    SUBJECTIVE:  Here today in follow-up of left lower lobe pneumonia.  I saw her about a month ago and x-ray was consistent with infiltrate.  Treated with a course of Augmentin and she is doing much better.  Seen with her daughter who does the interpretation.  Patient no longer coughing.  No longer short of breath.  No illness symptoms at all.  We reviewed her elevated sugar numbers and she admits that she has not been as faithful with taking her medication.  Is doing so now.  Doing well.  Reports no interval health concerns.      Review of systems otherwise negative.  Past medical, family, and social history reviewed and updated in chart.    OBJECTIVE:  /67 (BP Location: Right arm, Patient Position: Sitting, Cuff Size: Adult Regular)   Pulse 116   Temp 98.4  F (36.9  C) (Oral)   Resp 18   Ht 1.448 m (4' 9\")   Wt 56.5 kg (124 lb 9.6 oz)   LMP  (LMP Unknown)   SpO2 95%   BMI 26.96 kg/m    Alert, pleasant, upbeat, and in no apparent discomfort.  S1 and S2 normal, no murmurs, clicks, gallops or rubs. Regular rate and rhythm. Chest is clear; no wheezes or rales. No edema or JVD.  Past labs reviewed with the patient.     ASSESSMENT / PLAN:  (J18.1) Pneumonia of left lower lobe due to infectious organism (H)  (primary encounter diagnosis)  Comment: Resolved.  Discussed precautions due to recent coronavirus pandemic  Plan:     (E11.42) Type 2 diabetes mellitus with diabetic polyneuropathy, without long-term current use of insulin (H)  Comment: Working on compliance with her medications and plan to recheck in about 3 months  Plan:     Follow up 3 months  S. Titi Peguero MD    (Chart documentation completed in part with Dragon voice-recognition software.  Even " though reviewed some grammatical, spelling, and word errors may remain.)

## 2020-04-06 ENCOUNTER — TELEPHONE (OUTPATIENT)
Dept: FAMILY MEDICINE | Facility: CLINIC | Age: 75
End: 2020-04-06

## 2020-04-06 NOTE — TELEPHONE ENCOUNTER
Patient needs a visit for pain medication.     TC to please call patient and schedule telephone appointment. Thank you.    Miki Nova RN, BSN, PHN

## 2020-04-06 NOTE — TELEPHONE ENCOUNTER
Reason for Call:  Other call back and prescription    Detailed comments: Enrique fell on her deck a couple days ago on her left side. Daughter Oudomphone called and said her back and leg is a bit bruised and painful. Asking if you can prescribe something for her pain.  Please call to advise.  Thank you     Phone Number Patient can be reached at: Home number on file [phone] (home)    Best Time: Any    Can we leave a detailed message on this number? YES    Call taken on 4/6/2020 at 1:39 PM by Ninfa Parish

## 2020-04-08 ENCOUNTER — PATIENT OUTREACH (OUTPATIENT)
Dept: GERIATRIC MEDICINE | Facility: CLINIC | Age: 75
End: 2020-04-08

## 2020-04-08 NOTE — PROGRESS NOTES
[facility] Care Coordination Contact    Annual HV and PCA reassessment scheduled with member's daughter, [place] for 4/16/20 at 10am. Daughter is aware visit will take place via telephone d/t COVID 19.     Regine Stapleton RN, PHN  [facility]  [phone]

## 2020-04-13 DIAGNOSIS — E11.42 DIABETIC POLYNEUROPATHY ASSOCIATED WITH TYPE 2 DIABETES MELLITUS (H): ICD-10-CM

## 2020-04-13 NOTE — TELEPHONE ENCOUNTER
Reason for Call:  Medication or medication refill:    Do you use a [facility]?  Name of the pharmacy and phone number for the current request:  [id number] - [facility] [address] [place] [address] AT [facility]  [phone]    Name of the medication requested: HYDROcodone-acetaminophen (NORCO)     Other request:     Can we leave a detailed message on this number? YES    Phone number patient can be reached at: Home number on file [phone] (home)    Best Time:     Call taken on 4/13/2020 at 11:05 AM by Ninfa Parish

## 2020-04-13 NOTE — TELEPHONE ENCOUNTER
Requested Prescriptions   Pending Prescriptions Disp Refills     HYDROcodone-acetaminophen (NORCO) 5-325 MG tablet 40 tablet 0     Sig: Take 1 tablet by mouth 3 times daily as needed for severe pain Needs to be seen for any further refill.       There is no refill protocol information for this order          HYDROcodone-acetaminophen (NORCO) 5-325 MG tablet      Last Written Prescription Date:  3/18/2020  Last Fill Quantity: 40,   # refills: 0  Last Office Visit: 3/18/2020  Future Office visit:       Routing refill request to provider for review/approval because:  Drug not on the G, P or [facility] refill protocol or controlled substance

## 2020-04-16 ENCOUNTER — PATIENT OUTREACH (OUTPATIENT)
Dept: GERIATRIC MEDICINE | Facility: CLINIC | Age: 75
End: 2020-04-16

## 2020-04-16 RX ORDER — HYDROCODONE BITARTRATE AND ACETAMINOPHEN 5; 325 MG/1; MG/1
1 TABLET ORAL 3 TIMES DAILY PRN
Qty: 40 TABLET | Refills: 0 | Status: SHIPPED | OUTPATIENT
Start: 2020-04-16 | End: 2020-05-26

## 2020-04-16 ASSESSMENT — ACTIVITIES OF DAILY LIVING (ADL)
DEPENDENT_IADLS:: CLEANING;COOKING;LAUNDRY;SHOPPING;MEDICATION MANAGEMENT;MEAL PREPARATION;MONEY MANAGEMENT;TRANSPORTATION;INCONTINENCE

## 2020-04-16 NOTE — PROGRESS NOTES
[facility] Care Coordination Contact    CC called member at [phone] with [facility] Language Line  Andrey (ID [id number].) to complete scheduled telephonic HRA/PCA reassessment. No response.  left a VM for member to call CC back.     Regine Stapleton RN, PHN  [facility]  [phone]

## 2020-04-16 NOTE — PROGRESS NOTES
[facility] Care Coordination Contact    [facility] Home Visit Assessment     Home visit for Health Risk Assessment with Enrique Pruitt completed on April 16, 2020. HRA completed via telephone d/t COVID 19 concerns.     Type of residence:: Private home - stairs  Current living arrangement:: I live in a private home with family     Assessment completed with:: Patient, Care Team Member, Family    Current Care Plan  Member currently receiving the following home care services:     Member currently receiving the following community resources: DME, Housekeeping/Chore Agency, PCA, Transportation Services      Medication Review  Medication reconciliation completed in Epic: Yes  Medication set-up & administration: Family/informal caregiver sets up weekly.  Family caregiver administers medications.  Medication Risk Assessment Medication (1 or more, place referral to MTM): N/A: No risk factors identified  MTM Referral Placed: No: No risk factors idenified    Mental/Behavioral Health   Depression Screening: See PHQ assessment flowsheet.   Mental health DX:: No      Mental Health Diagnosis: No  Mental Health Services: None: No further intervention needed at this time.    Falls Assessment:   Fallen 2 or more times in the past year?: No   Any fall with injury in the past year?: No    ADL/IADL Dependencies:   Dependent ADLs:: Ambulation-walker, Bathing, Dressing, Eating, Grooming, Incontinence, Transfers, Wheelchair-with assist, Toileting  Dependent IADLs:: Cleaning, Cooking, Laundry, Shopping, Medication Management, Meal Preparation, Money Management, Transportation, Incontinence    [facility] Health Plan sponsored benefits: Shared information re: Silver Sneakers/gym memberships, ASA, Calcium +D.    PCA Assessment completed at visit: Yes     Elderly Waiver Eligibility: Yes-will open to EW    Care Plan & Recommendations: CC completed annual HRA and PCA reassessment with member, daughter Jaleel and Sheyla language  line  [place] (ID# [email or web address].) Enrique reports health is managed fair. Enrique states she is taking her medications daily, attending routine clinic visits and managing chronic pain. Enrique states having chronic pain in shoulder, LE and Hands. Enrique reports neuropathy pain daily in feet/hand. Enrique manages pain with medication, massage, rest and walking. Enrique reports monitoring blood sugar multiple times daily. Enrique reports PCA, Homemaker and DME services are meeting needs. CC will reauthorize PCA and homemaker services.     See [facility] for detailed assessment information.    Follow-Up Plan: Member informed of future contact, plan to f/u with member with a 6 month telephone assessment.  Contact information shared with member and family, encouraged member to call with any questions or concerns at any time.    [place] care continuum providers: Please refer to Health Care Home on the [facility] Problem List to view this patient's [facility] Care Plan Summary.        Regine Stapleton RN, PHN  [facility]  [phone]

## 2020-04-16 NOTE — TELEPHONE ENCOUNTER
Controlled Substance Refill Request for Norco  Problem List Complete:  Yes  Chronic pain syndrome   Problem Detail     Noted:  12/14/2016    Priority:  Medium    Overview Addendum 1/30/2020  2:28 PM by Miki Nova RN    Patient is followed by Lana Peguero MD for ongoing prescription of pain medication.  All refills should be approved by this provider, or covering partner.     Medication(s): vicodin 5.   Maximum quantity per month: 40-60 = < 90 MED  Narcan n/a  Clinic visit frequency required: Q 6  months      Controlled substance agreement: 5/17/19  Encounter-Level CSA - 04/25/2017:    Controlled Substance Agreement - Scan on 5/2/2017  8:46 AM: CONTROLLED SUBSTANCE AGREEMENT (below)         Patient-Level CSA:    Controlled Substance Agreement - Opioid - Scan on 5/20/2019  6:59 AM (below)            Pain Clinic evaluation in the past: No     DIRE Total Score(s):  No flowsheet data found.     Last MNP website verification: 01/30/20    [email or web address]    Previous Version       checked in past 3 months?  Yes 1/30/2020   Last Written Prescription Date:  3/18/2020  Last Fill Quantity: 40 tablets  # refills: 0   Last office visit: 3/18/2020 with prescribing provider:  3/18/2020   Future Office Visit:  None  RX monitoring program ([place]) reviewed:  not reviewed/not due - last done on 1/30/2020    [place]MP profile:  [email or web address]        Miki Nova RN, BSN, PHN

## 2020-04-22 ENCOUNTER — PATIENT OUTREACH (OUTPATIENT)
Dept: GERIATRIC MEDICINE | Facility: CLINIC | Age: 75
End: 2020-04-22

## 2020-04-22 NOTE — LETTER
[address], [address]  [place] [address]  Phone:  [phone]  Fax:  [phone]        April 22, 2020    ENRIQUE NEWBERRY  [address] [address]    Dear Enrique,    Evan is a copy of your completed PCA Assessment and Service Plan.  This is for your records and no action is required by you.  If you have additional questions regarding your assessment please contact me at [phone]. If you feel that your needs are not being met, please contact the Clinical Supervisor at [phone].    Sincerely,    Regine Stapleton RN, PHN    E-mail:[email or web address]  Phone: [phone]    Care Manager  [facility]            Enclosure:  Completed PCA assessment

## 2020-04-22 NOTE — PROGRESS NOTES
[facility] Care Coordination Contact    [facility]:  Emailed completed PCA assessment to [facility].  Faxed copy of PCA assessment to PCA Agency and mailed copy to member.  Faxed MD Communication to PCP.     Brittney Lee  Care Management Specialist  [facility]  [phone]

## 2020-04-22 NOTE — LETTER
May 7, 2020      VAN NEWBERRY  [address] [address]      Dear Van:    At [facility], we are dedicated to improving your health and well-being. Enclosed is the Comprehensive Care Plan that we developed with you on 04/16/2020. Please review the Care Plan carefully.    As a reminder, some of the things we discussed at your visit include:    Your physical and mental health    Ways to reduce falls    Health care needs you may have    Don t forget to contact your care coordinator if you:    Have been hospitalized or plan to be hospitalized     Have had a fall     Have experienced a change in physical health    Are experiencing emotional problems     If you do not agree with your Care Plan, have questions about it, or have experienced a change in your needs, please call me at [phone]. If you are hearing impaired, please call the [place] Relay at 355 or [phone] (qodrgd-zh-wsrkuy relay service).    Sincerely,    Regine Stapleton RN, PHN    E-mail:[email or web address]  Phone: [phone]    Care Manager  [facility] ([facility]) is a health plan that contracts with both Medicare and the [place] Medical Assistance (Medicaid) program to provide benefits of both programs to enrollees. Enrollment in [facility] depends on contract renewal.    [id number])     [id number] (11/18)

## 2020-05-07 NOTE — PROGRESS NOTES
[facility] Care Coordination Contact    Received after visit chart from care coordinator.  Completed following tasks: Mailed copy of care plan to client, Updated services in access and Submitted referrals/auths for [facility].    Provider Signature - No POC Shared:  Member indicates that they do not want their POC shared with any EW providers.    Brittney Lee  Care Management Specialist  [facility]  [phone]

## 2020-05-21 DIAGNOSIS — E11.42 DIABETIC POLYNEUROPATHY ASSOCIATED WITH TYPE 2 DIABETES MELLITUS (H): ICD-10-CM

## 2020-05-21 DIAGNOSIS — G89.4 CHRONIC PAIN SYNDROME: ICD-10-CM

## 2020-05-21 NOTE — TELEPHONE ENCOUNTER
Reason for Call:  Medication or medication refill: Medication Refill.    Do you use a [facility]?  Name of the pharmacy and phone number for the current request:  [id number] - [facility] [address] AT [facility]    Name of the medication requested: Hydrocodone- acetaminophen.    Can we leave a detailed message on this number? YES    Phone number patient can be reached at: Home number on file [phone] (home)    Best Time: Anytime    Call taken on 5/21/2020 at 2:14 PM by Cristiane Lewis

## 2020-05-26 RX ORDER — HYDROCODONE BITARTRATE AND ACETAMINOPHEN 5; 325 MG/1; MG/1
1 TABLET ORAL 3 TIMES DAILY PRN
Qty: 40 TABLET | Refills: 0 | Status: SHIPPED | OUTPATIENT
Start: 2020-05-26 | End: 2020-06-05

## 2020-05-26 NOTE — TELEPHONE ENCOUNTER
Controlled Substance Refill Request for Norco  Problem List Complete:  Yes  Patient is followed by Lana Peguero MD for ongoing prescription of pain medication.  All refills should be approved by this provider, or covering partner.     Medication(s): vicodin 5.   Maximum quantity per month: 40-60 = < 90 MED  Narcan n/a  Clinic visit frequency required: Q 6  months      Controlled substance agreement: 5/17/19  Encounter-Level CSA - 04/25/2017:    Controlled Substance Agreement - Scan on 5/2/2017  8:46 AM: CONTROLLED SUBSTANCE AGREEMENT (below)         Patient-Level CSA:    Controlled Substance Agreement - Opioid - Scan on 5/20/2019  6:59 AM (below)            Pain Clinic evaluation in the past: No     DIRE Total Score(s):  No flowsheet data found.     Last [facility] website verification: 01/30/20    [email or web address]      checked in past 3 months?  No, route to RN     RX monitoring program ([place]MP) reviewed:  reviewed, TODAY- no concerns    MNPMP profile:  [email or web address]    Routing refill request to provider for review/approval because:  Drug not on the [facility] refill protocol   Pattie Mejía RN  [facility]

## 2020-05-27 ENCOUNTER — TELEPHONE (OUTPATIENT)
Dept: FAMILY MEDICINE | Facility: CLINIC | Age: 75
End: 2020-05-27

## 2020-05-27 NOTE — TELEPHONE ENCOUNTER
Refilled - likely will have to wait post-COVID for follow up visit    Routing to INR pool to review, advise, and call patient with instructions.    Critical lab results INR 8.28.     Received email from earlier today from REJI Medel, for patient to hold coumadin tonight if INR is above 3.  Spoke with patient's , Bill, and he verbalized understanding to hold coumadin tonight (consent on file).     Left message on anticoagulant phone number regarding critical lab results.      Toñito Garcia RN

## 2020-05-27 NOTE — TELEPHONE ENCOUNTER
Forms from [facility] placed on Dr. Marielena alcaraz for completion.    Sheri Gonzalez

## 2020-06-05 ENCOUNTER — VIRTUAL VISIT (OUTPATIENT)
Dept: FAMILY MEDICINE | Facility: CLINIC | Age: 75
End: 2020-06-05
Payer: COMMERCIAL

## 2020-06-05 DIAGNOSIS — E11.42 DIABETIC POLYNEUROPATHY ASSOCIATED WITH TYPE 2 DIABETES MELLITUS (H): ICD-10-CM

## 2020-06-05 DIAGNOSIS — I10 ESSENTIAL HYPERTENSION WITH GOAL BLOOD PRESSURE LESS THAN 140/90: ICD-10-CM

## 2020-06-05 DIAGNOSIS — R05.9 COUGH: ICD-10-CM

## 2020-06-05 DIAGNOSIS — E11.42 TYPE 2 DIABETES MELLITUS WITH DIABETIC POLYNEUROPATHY, WITHOUT LONG-TERM CURRENT USE OF INSULIN (H): Primary | ICD-10-CM

## 2020-06-05 DIAGNOSIS — E78.5 HYPERLIPIDEMIA LDL GOAL <70: ICD-10-CM

## 2020-06-05 DIAGNOSIS — G89.4 CHRONIC PAIN SYNDROME: ICD-10-CM

## 2020-06-05 PROCEDURE — 99214 OFFICE O/P EST MOD 30 MIN: CPT | Mod: 95 | Performed by: FAMILY MEDICINE

## 2020-06-05 RX ORDER — SIMVASTATIN 20 MG
20 TABLET ORAL DAILY
Qty: 90 TABLET | Refills: 1 | Status: SHIPPED | OUTPATIENT
Start: 2020-06-05 | End: 2021-03-09

## 2020-06-05 RX ORDER — HYDROCODONE BITARTRATE AND ACETAMINOPHEN 5; 325 MG/1; MG/1
1 TABLET ORAL 3 TIMES DAILY PRN
Qty: 40 TABLET | Refills: 0 | Status: SHIPPED | OUTPATIENT
Start: 2020-06-15 | End: 2020-07-14

## 2020-06-05 RX ORDER — PREGABALIN 100 MG/1
100 CAPSULE ORAL 2 TIMES DAILY
Qty: 180 CAPSULE | Refills: 1 | Status: SHIPPED | OUTPATIENT
Start: 2020-06-05 | End: 2021-11-30

## 2020-06-05 RX ORDER — BENZONATATE 100 MG/1
100-200 CAPSULE ORAL 3 TIMES DAILY PRN
Qty: 30 CAPSULE | Refills: 1 | Status: SHIPPED | OUTPATIENT
Start: 2020-06-05 | End: 2020-07-14

## 2020-06-05 RX ORDER — AMLODIPINE BESYLATE 10 MG/1
10 TABLET ORAL DAILY
Qty: 90 TABLET | Refills: 1 | Status: SHIPPED | OUTPATIENT
Start: 2020-06-05 | End: 2020-10-06

## 2020-06-05 NOTE — PATIENT INSTRUCTIONS
At [facility], we strive to deliver an exceptional experience to you, every time we see you. If you receive a survey, please complete it as we do value your feedback.  If you have MyChart, you can expect to receive results automatically within 24 hours of their completion.  Your provider will send a note interpreting your results as well.   If you do not have MyChart, you should receive your results in about a week by mail.    Your care team:     Family Medicine   EDITH Edwards APRN CNP S. Matthew Hockett, MD Pamela Kolacz, MD Angela Wermerskirchen, MD    Internal Medicine  Elijah Smith MD     Clinic hours: Monday - Wednesday 7 am-7 pm   Thursdays and Fridays 7 am-5 pm.     [facility]: Monday - Friday 11 am-9 pm,   Saturday and Sunday 9 am-5 pm.     [facility]: Monday - Thursday 8 am - 7 pm; Friday 8 am - 6 pm    Clinic: [phone]   [facility]: [phone]     Use [email or web address] for 24/7 diagnosis and treatment of dozens of conditions.

## 2020-06-05 NOTE — PROGRESS NOTES
"Enrique Pruitt is a 75 year old female who is being evaluated via a billable telephone visit.      The patient has been notified of following:     \"This telephone visit will be conducted via a call between you and your physician/provider. We have found that certain health care needs can be provided without the need for a physical exam.  This service lets us provide the care you need with a short phone conversation.  If a prescription is necessary we can send it directly to your pharmacy.  If lab work is needed we can place an order for that and you can then stop by our lab to have the test done at a later time.    Telephone visits are billed at different rates depending on your insurance coverage. During this emergency period, for some insurers they may be billed the same as an in-person visit.  Please reach out to your insurance provider with any questions.    If during the course of the call the physician/provider feels a telephone visit is not appropriate, you will not be charged for this service.\"    Patient has given verbal consent for Telephone visit?  Yes    What phone number would you like to be contacted at? [phone]    How would you like to obtain your AVS? Mail a copy    Subjective     Enrique Pruitt is a 75 year old female who presents via phone visit today for the following health issues:    HPI  Diabetes Follow-up    How often are you checking your blood sugar? Two times daily  Blood sugar testing frequency justification:  Uncontrolled diabetes  What time of day are you checking your blood sugars (select all that apply)?  After meals  Have you had any blood sugars above 200?  No  Have you had any blood sugars below 70?  No    What symptoms do you notice when your blood sugar is low?  Weak    What concerns do you have today about your diabetes? None     Do you have any of these symptoms? (Select all that apply)  Numbness in feet    Have you had a diabetic eye exam in the last 12 months? " yes      Hyperlipidemia Follow-Up      Are you regularly taking any medication or supplement to lower your cholesterol?   Yes- 1    Are you having muscle aches or other side effects that you think could be caused by your cholesterol lowering medication?  No    Hypertension Follow-up      Do you check your blood pressure regularly outside of the clinic? No     Are you following a low salt diet? Yes    Are your blood pressures ever more than 140 on the top number (systolic) OR more   than 90 on the bottom number (diastolic), for example 140/90?n/a     BP Readings from Last 2 Encounters:   20 129/67   20 105/71     Hemoglobin A1C (%)   Date Value   2020 9.6 (H)   2019 7.1 (H)     LDL Cholesterol Calculated (mg/dL)   Date Value   2020 44   2019 106 (H)         How many servings of fruits and vegetables do you eat daily?  2-3    On average, how many sweetened beverages do you drink each day (Examples: soda, juice, sweet tea, etc.  Do NOT count diet or artificially sweetened beverages)?   2    How many days per week do you exercise enough to make your heart beat faster? No     How many minutes a day do you exercise enough to make your heart beat faster?n/a     How many days per week do you miss taking your medication? 0    Spoke with patient and her daughter via phone in follow-up of ongoing chronic issues.  We last checked on her diabetes in February it was shockingly high at 9.6.  Previously well controlled.  Blood pressure and cholesterol been under good control and she continues with those medications as well.  Continues to have a lot of pain in her legs related to peripheral neuropathy.  But I have kept her use of pain medication to a very bare minimum.  Previously on higher levels but I do not think that this is a good idea given her age and the risks associated with it.  So I discussed this again with the patient and her daughter.  Controlled substance agreement  in May  and we discussed updating this but will hold off for a few months because of the covert outbreak.  Patient has been staying at home pretty much exclusively.  If she does go out she wears a mass and observes appropriate precautions.       Objective   Reported vitals:  LMP  (LMP Unknown)    healthy, alert and no distress  PSYCH: Alert and oriented times 3; coherent speech, normal   rate and volume, able to articulate logical thoughts, able   to abstract reason, no tangential thoughts, no hallucinations   or delusions  Her affect is normal  RESP: No cough, no audible wheezing, able to talk in full sentences  Remainder of exam unable to be completed due to telephone visits    Diagnostic Test Results:  Labs reviewed in Epic        Assessment/Plan:  1. Type 2 diabetes mellitus with diabetic polyneuropathy, without long-term current use of insulin (H)  A1c not well controlled will be checked a few months ago.  Patient is compliant with medication and we will recheck this in the next couple of weeks  - metFORMIN (GLUCOPHAGE) 500 MG tablet; TAKE 2 TABLETS BY MOUTH TWICE DAILY WITH MEALS  Dispense: 360 tablet; Refill: 0  - **A1C FUTURE anytime; Future    2. Essential hypertension with goal blood pressure less than 140/90  Has been well controlled.  Continue same regimen  - amLODIPine (NORVASC) 10 MG tablet; Take 1 tablet (10 mg) by mouth daily  Dispense: 90 tablet; Refill: 1  - order for DME; Equipment being ordered: BP cuff  Dispense: 1 Device; Refill: 0    3. Hyperlipidemia LDL goal <70  Continue simvastatin  - simvastatin (ZOCOR) 20 MG tablet; Take 1 tablet (20 mg) by mouth daily  Dispense: 90 tablet; Refill: 1    4. Diabetic polyneuropathy associated with type 2 diabetes mellitus  Doing okay on Lyrica but keeping hydrocodone use to a minimum  - pregabalin (LYRICA) 100 MG capsule; Take 1 capsule (100 mg) by mouth 2 times daily  Dispense: 180 capsule; Refill: 1  - HYDROcodone-acetaminophen (NORCO) 5-325 MG tablet; Take 1  tablet by mouth 3 times daily as needed for severe pain  Dispense: 40 tablet; Refill: 0    5. Chronic pain syndrome  Due to update controlled substance agreement in the next few months.  - Drug Abuse Screen Panel 13, Urine (Pain Care Package); Future    6. Cough    - benzonatate (TESSALON) 100 MG capsule; Take 1-2 capsules (100-200 mg) by mouth 3 times daily as needed for cough  Dispense: 30 capsule; Refill: 1    No follow-ups on file.      Phone call duration:  12 minutes    Lana Peguero MD

## 2020-06-16 DIAGNOSIS — E11.42 DIABETIC POLYNEUROPATHY ASSOCIATED WITH TYPE 2 DIABETES MELLITUS (H): ICD-10-CM

## 2020-06-16 DIAGNOSIS — L30.9 DERMATITIS: ICD-10-CM

## 2020-06-16 NOTE — TELEPHONE ENCOUNTER
Reason for Call:  Medication or medication refill:    Do you use a [facility]?  Name of the pharmacy and phone number for the current request:  [id number] [address] [place] [address] AT [facility]  [phone]    Name of the medication requested: HYDROcodone-acetaminophen (NORCO)     Other request:     Can we leave a detailed message on this number?     Phone number patient can be reached at: Home number on file [phone] (home)    Best Time: Any      Call taken on 6/16/2020 at 2:31 PM by Ninfa Parish

## 2020-06-17 RX ORDER — HYDROCODONE BITARTRATE AND ACETAMINOPHEN 5; 325 MG/1; MG/1
1 TABLET ORAL 3 TIMES DAILY PRN
Qty: 40 TABLET | Refills: 0 | OUTPATIENT
Start: 2020-06-17

## 2020-06-18 RX ORDER — TRIAMCINOLONE ACETONIDE 1 MG/G
CREAM TOPICAL
Qty: 60 G | Refills: 0 | Status: SHIPPED | OUTPATIENT
Start: 2020-06-18 | End: 2020-08-19

## 2020-06-18 NOTE — TELEPHONE ENCOUNTER
Prescription approved per [facility] Refill Protocol.      Miki Nova RN, BSN, PHN

## 2020-07-14 ENCOUNTER — VIRTUAL VISIT (OUTPATIENT)
Dept: FAMILY MEDICINE | Facility: CLINIC | Age: 75
End: 2020-07-14
Payer: COMMERCIAL

## 2020-07-14 DIAGNOSIS — E11.42 TYPE 2 DIABETES MELLITUS WITH DIABETIC POLYNEUROPATHY, WITHOUT LONG-TERM CURRENT USE OF INSULIN (H): Primary | ICD-10-CM

## 2020-07-14 DIAGNOSIS — F51.04 CHRONIC INSOMNIA: ICD-10-CM

## 2020-07-14 DIAGNOSIS — I10 ESSENTIAL HYPERTENSION WITH GOAL BLOOD PRESSURE LESS THAN 140/90: ICD-10-CM

## 2020-07-14 DIAGNOSIS — K21.9 GASTROESOPHAGEAL REFLUX DISEASE WITHOUT ESOPHAGITIS: ICD-10-CM

## 2020-07-14 DIAGNOSIS — E78.5 HYPERLIPIDEMIA LDL GOAL <70: ICD-10-CM

## 2020-07-14 DIAGNOSIS — G89.4 CHRONIC PAIN SYNDROME: ICD-10-CM

## 2020-07-14 DIAGNOSIS — E11.42 DIABETIC POLYNEUROPATHY ASSOCIATED WITH TYPE 2 DIABETES MELLITUS (H): ICD-10-CM

## 2020-07-14 PROCEDURE — 99214 OFFICE O/P EST MOD 30 MIN: CPT | Mod: 95 | Performed by: FAMILY MEDICINE

## 2020-07-14 RX ORDER — RAMELTEON 8 MG/1
8 TABLET ORAL AT BEDTIME
Qty: 30 TABLET | Refills: 5 | Status: SHIPPED | OUTPATIENT
Start: 2020-07-14

## 2020-07-14 RX ORDER — GLIMEPIRIDE 4 MG/1
TABLET ORAL
Qty: 180 TABLET | Refills: 1 | Status: SHIPPED | OUTPATIENT
Start: 2020-07-14 | End: 2020-12-20

## 2020-07-14 RX ORDER — PIOGLITAZONEHYDROCHLORIDE 45 MG/1
45 TABLET ORAL DAILY
Qty: 90 TABLET | Refills: 1 | Status: SHIPPED | OUTPATIENT
Start: 2020-07-14 | End: 2020-12-20

## 2020-07-14 RX ORDER — HYDROCODONE BITARTRATE AND ACETAMINOPHEN 5; 325 MG/1; MG/1
1 TABLET ORAL 3 TIMES DAILY PRN
Qty: 40 TABLET | Refills: 0 | Status: SHIPPED | OUTPATIENT
Start: 2020-07-14 | End: 2020-09-25

## 2020-07-14 NOTE — Clinical Note
Mail AVS  Please mail a copy of controlled substance agreement (letters) to patient along with a stamped, return envelope.  Once it has been returned please send it to me for cosignature.  TIMMY Peguero M.D.

## 2020-07-14 NOTE — LETTER
[facility]  07/14/20    Patient: Enrique Pruitt  YOB: 1945  Medical Record Number: [id number]                                                                  Opioid / Opioid Plus Controlled Substance Agreement    I understand that my care provider has prescribed an opioid (narcotic) controlled substance to help manage my condition(s). I am taking this medicine to help me function or work. I know this is strong medicine, and that it can cause serious side effects. Opioid medicine can be sedating, addicting and may cause a dependency on the drug. They can affect my ability to drive or think, and cause depression. They need to be taken exactly as prescribed. Combining opioids with certain medicines or chemicals (such as cocaine, sedatives and tranquilizers, sleeping pills, meth) can be dangerous or even fatal. Also, if I stop opioids suddenly, I may have severe withdrawal symptoms. Last, I understand that opioids do not work for all types of pain nor for all patients. If not helpful, I may be asked to stop them.      The risks, benefits, and side effects of these medicine(s) were explained to me. I agree that:    1. I will take part in other treatments as advised by my care team. This may be psychiatry or counseling, physical therapy, behavioral therapy, group treatment or a referral to a pain clinic. I will reduce or stop my medicine when my care team tells me to do so.  2. I will take my medicines as prescribed. I will not change the dose or schedule unless my care team tells me to. There will be no refills if I  run out early.   I may be contactedwithout warning and asked to complete a urine drug test or pill count at any time.   3. I will keep all my appointments, and understand this is part of the monitoring of opioids. My care team may require an office visit for EVERY opioid/controlled substance refill. If I miss appointments or don t follow instructions, my care team may  stop my medicine.  4. I will not ask other providers to prescribe controlled substances, and I will not accept controlled substances from other people. If I need another prescribed controlled substance for a new reason, I will tell my care team within 1 business day.  5. I will use one pharmacy to fill all of my controlled substance prescriptions, and it is up to me to make sure that I do not run out of my medicines on weekends or holidays. If my care team is willing to refill my opioid prescription without a visit, I must request refills only during office hours, refills may take up to 3 days to process, and it may take up to 5 to 7 days for my medicine to be mailed and ready at my pharmacy. Prescriptions will not be mailed anywhere except my pharmacy.        [id number]  Rev 12/18         Registration to scan to EHR                             Page 1 of 2               Controlled Substance Agreement Opioid        [facility]  07/14/20  Patient: Enrique Pruitt  YOB: 1945  Medical Record Number: [id number]                                                                  6. I am responsible for my prescriptions. If the medicine/prescription is lost or stolen, it will not be replaced. I also agree not to share controlled substance medicines with anyone.  7. I agree to not use ANY illegal or recreational drugs. This includes marijuana, cocaine, bath salts or other drugs. I agree not to use alcohol unless my care team says I may.          I agree to give urine samples whenever asked. If I don t give a urine sample, the care team may stop my medicine.    8. If I enroll in the [place] Medical Marijuana program, I will tell my care team. I will also sign an agreement to share my medical records with my care team.   9. I will bring in my list of medicines (or my medicine bottles) each time I come to the clinic.   10. I will tell my care team right away if I become pregnant or have a new  medical problem treated outside of my regular clinic.  11. I understand that this medicine can affect my thinking and judgment. It may be unsafe for me to drive, use machinery and do dangerous tasks. I will not do any of these things until I know how the medicine affects me. If my dose changes, I will wait to see how it affects me. I will contact my care team if I have concerns about medicine side effects.    I understand that if I do not follow any of the conditions above, my prescriptions or treatment may be stopped.      I agree that my provider, clinic care team, and pharmacy may work with any city, state or federal law enforcement agency that investigates the misuse, sale, or other diversion of my controlled medicine. I will allow my provider to discuss my care with or share a copy of this agreement with any other treating provider, pharmacy or emergency room where I receive care. I agree to give up (waive) any right of privacy or confidentiality with respect to these consents.     I have read this agreement and have asked questions about anything I did not understand.      ________________________________________________________________________  Patient signature - Date/Time -  Enrique Pruitt                                      ________________________________________________________________________  Witness signature                                                            ________________________________________________________________________  Provider signature - Lana Peguero MD      [id number]  Rev 12/18         Registration to scan to EHR                         Page 2 of 2                   Controlled Substance Agreement Opioid           Page 1 of 2  Opioid Pain Medicines (also known as Narcotics)  What You Need to Know    What are opioids?   Opioids are pain medicines that must be prescribed by a doctor.  They are also known as narcotics.    Examples are:     morphine (MS Contin,  [place])    oxycodone (Oxycontin)    oxycodone and acetaminophen (Percocet)    hydrocodone and acetaminophen (Vicodin, Norco)     fentanyl patch (Duragesic)     hydromorphone (Dilaudid)     methadone     What do opioids do well?   Opioids are best for short-term pain after a surgery or injury. They also work well for cancer pain. Unlike other pain medicines, they do not cause liver or kidney failure or ulcers. They may help some people with long-lasting (chronic) pain.     What do opioids NOT do well?   Opioids never get rid of pain entirely, and they do not work well for most patients with chronic pain. Opioids do not reduce swelling, one of the causes of pain. They also don t work well for nerve pain.                           For informational purposes only.  Not to replace the advice of your care provider.  Copyright [facility]. All right reserved. [place] [id number] 02/18.      Page 2 of 2    Risks and side effects   Talk to your doctor before you start or decide to keep taking one of these medicines. Side effects include:    Lowering your breathing rate enough to cause death    Overdose, including death, especially if taking higher than prescribed doses    Long-term opioid use    Worse depression symptoms; less pleasure in things you usually enjoy    Feeling tired or sluggish    Slower thoughts or cloudy thinking    Being more sensitive to pain over time; pain is harder to control    Trouble sleeping or restless sleep    Changes in hormone levels (for example, less testosterone)    Changes in sex drive or ability to have sex    Constipation    Unsafe driving    Itching and sweating    Feeling dizzy    Nausea, vomiting and dry mouth    What else should I know about opioids?  When someone takes opioids for too long or too often, they become dependent. This means that if you stop or reduce the medicine too quickly, you will have withdrawal symptoms.    Dependence is not the same as addiction.  Addiction is when people keep using a substance that harms their body, their mind or their relations with others. If you have a history of drug or alcohol abuse, taking opioids can cause a relapse.    Over time, opioids don t work as well. Most people will need higher and higher doses. The higher the dose, the more serious the side effects. We don t know the long-term effects of opioids.      Prescribed opioids aren't the best way to manage chronic pain    Other ways to manage pain include:      Ibuprofen or acetaminophen.  You should always try this first.      Treat health problems that may be causing pain.      acupuncture or massage, deep breathing, meditation, visual imagery, aromatherapy.      Use heat or ice at the pain site      Physical therapy and exercise      Stop smoking      See a counselor or therapist                                                  People who have used opioids for a long time may have a lower quality of life, worse depression, higher levels of pain and more visits to doctors.    Never share your opioids with others. Be sure to store opioids in a secure place, locked if possible.Young children can easily swallow them and overdose.     You can overdose on opioids.  Signs of overdose include decrease or loss of consciousness, slowed breathing, trouble waking and blue lips.  If someone is worried about overdose, they should call 911.    If you are at risk for overdose, you may get naloxone (Narcan, a medicine that reverses the effects of opioids.  If you overdose, a friend or family member can give you Narcan while waiting for the ambulance.  They need to know the signs of overdose and how to give Narcan.    While you're taking opioids:    Don't use alcohol or street drugs. Taking them together can cause death.    Don't take any of these medicines unless your doctor says its okay.  Taking these with opioids can cause death.    Benzodiazepines (such as lorazepam         or  diazepam)    Muscle relaxers (such as cyclobenzaprine)    sleeping pills    other opioids    Safe disposal of opioids  Find your area drug take-back program, your pharmacy mail-back program, buy a special disposal bag (such as Deterra) from your pharmacy or flush them down the toilet.  Use the guidelines at:  www.fda.gov/drugs/resourcesforyou

## 2020-07-14 NOTE — PROGRESS NOTES
"Enrique Pruitt is a 75 year old female who is being evaluated via a billable telephone visit.      The patient has been notified of following:     \"This telephone visit will be conducted via a call between you and your physician/provider. We have found that certain health care needs can be provided without the need for a physical exam.  This service lets us provide the care you need with a short phone conversation.  If a prescription is necessary we can send it directly to your pharmacy.  If lab work is needed we can place an order for that and you can then stop by our lab to have the test done at a later time.    Telephone visits are billed at different rates depending on your insurance coverage. During this emergency period, for some insurers they may be billed the same as an in-person visit.  Please reach out to your insurance provider with any questions.    If during the course of the call the physician/provider feels a telephone visit is not appropriate, you will not be charged for this service.\"    Patient has given verbal consent for Telephone visit?  Yes    What phone number would you like to be contacted at? [phone]    How would you like to obtain your AVS? Mail a copy    Subjective     Enrique Pruitt is a 75 year old female who presents via phone visit today for the following health issues:    HPI  Diabetes Follow-up    How often are you checking your blood sugar? Two times daily  Blood sugar testing frequency justification:  Uncontrolled diabetes  What time of day are you checking your blood sugars (select all that apply)?  Before meals   Have you had any blood sugars above 200?  No  Have you had any blood sugars below 70?  Yes- at night pts blood sugar drops to 60    What symptoms do you notice when your blood sugar is low?  Shakiness     What concerns do you have today about your diabetes? None     Do you have any of these symptoms? (Select all that apply)  Numbness in feet    Have you " had a diabetic eye exam in the last 12 months? Yes      Hyperlipidemia Follow-Up      Are you regularly taking any medication or supplement to lower your cholesterol?   Yes- Simvastatin    Are you having muscle aches or other side effects that you think could be caused by your cholesterol lowering medication?  No    Hypertension Follow-up      Do you check your blood pressure regularly outside of the clinic? No     Are you following a low salt diet? Yes    Are your blood pressures ever more than 140 on the top number (systolic) OR more   than 90 on the bottom number (diastolic), for example 140/90? NA    BP Readings from Last 2 Encounters:   03/18/20 129/67   02/11/20 105/71     Hemoglobin A1C (%)   Date Value   02/11/2020 9.6 (H)   09/23/2019 7.1 (H)     LDL Cholesterol Calculated (mg/dL)   Date Value   02/11/2020 44   09/23/2019 106 (H)         How many servings of fruits and vegetables do you eat daily?  2-3    On average, how many sweetened beverages do you drink each day (Examples: soda, juice, sweet tea, etc.  Do NOT count diet or artificially sweetened beverages)?   0    How many days per week do you exercise enough to make your heart beat faster? 3 or less    How many minutes a day do you exercise enough to make your heart beat faster? 9 or less    How many days per week do you miss taking your medication? 0    Spoke with patient through daughter in routine follow-up of diabetes, hypertension, lipids, ongoing pain.  Patient is well-known to me and A1c was not well controlled at last visit.  So we talked about setting up outpatient lab work.  Also talked about dietary changes that can help with all of this.  Patient is unable to get a lot of exercise in.  Things overall are fairly stable on her current regimen.  Because of her age and overall disease I do not like her taking a lot of pain medication we have cut way back on this.  But she is due for routine monitoring as well and we discussed this.    Reviewed  and updated as needed this visit by Provider         Review of Systems   Constitutional, HEENT, cardiovascular, pulmonary, gi and gu systems are negative, except as otherwise noted.       Objective   Reported vitals:  LMP  (LMP Unknown)    healthy, alert and no distress  PSYCH: Alert and oriented times 3; coherent speech, normal   rate and volume, able to articulate logical thoughts, able   to abstract reason, no tangential thoughts, no hallucinations   or delusions  Her affect is normal  RESP: No cough, no audible wheezing, able to talk in full sentences  Remainder of exam unable to be completed due to telephone visits    Diagnostic Test Results:  Labs reviewed in Epic        Assessment/Plan:  1. Type 2 diabetes mellitus with diabetic polyneuropathy, without long-term current use of insulin (H)  We will renew medications but they are due to schedule the outpatient lab tests and will do so.  - sitagliptin (JANUVIA) 50 MG tablet; Take 1 tablet (50 mg) by mouth daily  Dispense: 90 tablet; Refill: 1  - glimepiride (AMARYL) 4 MG tablet; TAKE 1 TABLET BY MOUTH TWICE DAILY  Dispense: 180 tablet; Refill: 1  - pioglitazone (ACTOS) 45 MG tablet; Take 1 tablet (45 mg) by mouth daily  Dispense: 90 tablet; Refill: 1    2. Diabetic polyneuropathy associated with type 2 diabetes mellitus  As above  - HYDROcodone-acetaminophen (NORCO) 5-325 MG tablet; Take 1 tablet by mouth 3 times daily as needed for severe pain  Dispense: 40 tablet; Refill: 0    3. Chronic pain syndrome  I am sending them a controlled substance agreement to sign and send back.  Will obtain urine drug screen at last visit    4. Essential hypertension with goal blood pressure less than 140/90  Has been doing well on current regimen.  Will send in an order for a home blood pressure cuff to follow-up  - order for DME; Equipment being ordered: BP cuff  Dispense: 1 Device; Refill: 0    5. Hyperlipidemia LDL goal <70  At goal.  Continue current therapy    6.  Gastroesophageal reflux disease without esophagitis  Stable on current regimen.  Continue same  - omeprazole (PRILOSEC) 20 MG DR capsule; Take 1 capsule (20 mg) by mouth daily  Dispense: 90 capsule; Refill: 3    7. Chronic insomnia  Stable on current regimen.  Continue same  - ramelteon (ROZEREM) 8 MG tablet; Take 1 tablet (8 mg) by mouth At Bedtime  Dispense: 30 tablet; Refill: 5    Return in about 3 months (around 10/14/2020) for Routine Follow-up of chronic issues.      Phone call duration:  12 minutes    Lana Peguero MD

## 2020-07-21 ENCOUNTER — OFFICE VISIT (OUTPATIENT)
Dept: OPTOMETRY | Facility: CLINIC | Age: 75
End: 2020-07-21
Payer: COMMERCIAL

## 2020-07-21 DIAGNOSIS — H35.373 EPIRETINAL MEMBRANE, BILATERAL: ICD-10-CM

## 2020-07-21 DIAGNOSIS — Z96.1 PSEUDOPHAKIA OF BOTH EYES: ICD-10-CM

## 2020-07-21 DIAGNOSIS — H04.123 DRY EYE SYNDROME OF BOTH EYES: ICD-10-CM

## 2020-07-21 DIAGNOSIS — H52.223 REGULAR ASTIGMATISM OF BOTH EYES: ICD-10-CM

## 2020-07-21 DIAGNOSIS — H52.4 PRESBYOPIA: ICD-10-CM

## 2020-07-21 DIAGNOSIS — E11.42 TYPE 2 DIABETES MELLITUS WITH DIABETIC POLYNEUROPATHY, WITHOUT LONG-TERM CURRENT USE OF INSULIN (H): Primary | ICD-10-CM

## 2020-07-21 DIAGNOSIS — H52.03 HYPEROPIA, BILATERAL: ICD-10-CM

## 2020-07-21 PROCEDURE — 92015 DETERMINE REFRACTIVE STATE: CPT | Performed by: OPTOMETRIST

## 2020-07-21 PROCEDURE — 92014 COMPRE OPH EXAM EST PT 1/>: CPT | Performed by: OPTOMETRIST

## 2020-07-21 RX ORDER — POLYETHYLENE GLYCOL 400 AND PROPYLENE GLYCOL 4; 3 MG/ML; MG/ML
1 SOLUTION/ DROPS OPHTHALMIC
Qty: 1 BOTTLE | Refills: 11 | Status: SHIPPED | OUTPATIENT
Start: 2020-07-21

## 2020-07-21 RX ORDER — HYPOC ACID/SOD HYPO/NACL/WATER 0.02 %
1 SPRAY, NON-AEROSOL (ML) TOPICAL AT BEDTIME
Qty: 1 BOTTLE | Refills: 11 | Status: SHIPPED | OUTPATIENT
Start: 2020-07-21 | End: 2021-07-21

## 2020-07-21 ASSESSMENT — REFRACTION_WEARINGRX
OD_CYLINDER: +1.50
OD_AXIS: 003
OS_SPHERE: -1.00
OS_AXIS: 170
OS_ADD: +2.50
OD_SPHERE: +0.25
OS_CYLINDER: +2.50
OD_ADD: +2.50
SPECS_TYPE: BIFOCAL

## 2020-07-21 ASSESSMENT — CONF VISUAL FIELD
METHOD: COUNTING FINGERS
OD_NORMAL: 1
OS_NORMAL: 1

## 2020-07-21 ASSESSMENT — REFRACTION_MANIFEST
OD_AXIS: 003
OS_AXIS: 170
OS_CYLINDER: +2.50
OD_SPHERE: +0.25
OD_CYLINDER: +1.50
OS_ADD: +3.00
OS_SPHERE: -1.00
OD_ADD: +3.00

## 2020-07-21 ASSESSMENT — SLIT LAMP EXAM - LIDS
COMMENTS: MEIBOMIAN GLAND DYSFUNCTION, DERMATOCHALASIS
COMMENTS: MEIBOMIAN GLAND DYSFUNCTION, DERMATOCHALASIS

## 2020-07-21 ASSESSMENT — VISUAL ACUITY
METHOD: SNELLEN - LINEAR
OS_CC: 20/40
OD_PH_CC: 20/80
OS_CC+: -2
OD_CC: 20/70
CORRECTION_TYPE: GLASSES
OD_CC+: -2
OD_CC: 20/200
OS_CC: 20/200
OS_PH_CC: 20/70

## 2020-07-21 ASSESSMENT — EXTERNAL EXAM - RIGHT EYE: OD_EXAM: NORMAL

## 2020-07-21 ASSESSMENT — CUP TO DISC RATIO
OD_RATIO: 0.45
OS_RATIO: 0.45

## 2020-07-21 ASSESSMENT — TONOMETRY
IOP_METHOD: TONOPEN
OD_IOP_MMHG: 16
OS_IOP_MMHG: 18

## 2020-07-21 ASSESSMENT — EXTERNAL EXAM - LEFT EYE: OS_EXAM: NORMAL

## 2020-07-21 NOTE — LETTER
7/21/2020         RE: Enrique Pruitt  [address]  [place] [address]        Dear Colleague,    Thank you for referring your patient, Enrique Pruitt, to [facility]. Please see a copy of my visit note below.    Chief Complaint   Patient presents with     Diabetic Eye Exam     Accompanied by daughter  Hemoglobin A1C   Date Value Ref Range Status   02/11/2020 9.6 (H) 0 - 5.6 % Final     Comment:     Results confirmed by repeat test  Normal <5.7% Prediabetes 5.7-6.4%  Diabetes 6.5% or higher - adopted from ADA   consensus guidelines.     09/23/2019 7.1 (H) 0 - 5.6 % Final     Comment:     Normal <5.7% Prediabetes 5.7-6.4%  Diabetes 6.5% or higher - adopted from ADA   consensus guidelines.     05/17/2019 9.4 (H) 0 - 5.6 % Final     Comment:     Normal <5.7% Prediabetes 5.7-6.4%  Diabetes 6.5% or higher - adopted from ADA   consensus guidelines.           Last Eye Exam: 1-9-2019  Dilated Previously: Yes    What are you currently using to see?  glasses    Distance Vision Acuity: Satisfied with vision    Near Vision Acuity: Satisfied with vision while reading  with glasses    Eye Comfort: good  Do you use eye drops? : Yes: ?-artificial tear- would like prescription for drops  Occupation or Hobbies: none    Karey Braden Optometric Assistant, A.B.O.C.     Medical, surgical and family histories reviewed and updated 7/21/2020.       OBJECTIVE: See Ophthalmology exam    ASSESSMENT:    ICD-10-CM    1. Type 2 diabetes mellitus with diabetic polyneuropathy, without long-term current use of insulin (H)  E11.42 EYE EXAM (SIMPLE-NONBILLABLE)   2. Pseudophakia of both eyes  Z96.1 EYE EXAM (SIMPLE-NONBILLABLE)   3. Epiretinal membrane, bilateral  H35.373 EYE EXAM (SIMPLE-NONBILLABLE)   4. Dry eye syndrome of both eyes  H04.123 EYE EXAM (SIMPLE-NONBILLABLE)     polyethylene glycol-propylene glycol (SYSTANE) 0.4-0.3 % SOLN ophthalmic solution   5. Presbyopia  H52.4 REFRACTION   6. Regular  astigmatism of both eyes  H52.223 REFRACTION   7. Hyperopia, bilateral  H52.03 REFRACTION      PLAN:    Enrique Pruitt aware  eye exam results will be sent to Lana Peguero.  Patient Instructions   There are not any signs of the diabetes affecting the eyes today.  It is important that you get your eyes dilated once yearly and keep good control of your diabetes.    Referral to Vitreoretinal Surgery for evaluation- (patient declines)    Systane Ultra 1 drop both eyes 2-4 x day.    Eyeglass prescription given.    Return in 1 year for a complete eye exam or sooner if needed.    Lex Martin, OD             Again, thank you for allowing me to participate in the care of your patient.        Sincerely,        Lex Martin, OD

## 2020-07-21 NOTE — PROGRESS NOTES
Chief Complaint   Patient presents with     Diabetic Eye Exam     Accompanied by daughter  Hemoglobin A1C   Date Value Ref Range Status   02/11/2020 9.6 (H) 0 - 5.6 % Final     Comment:     Results confirmed by repeat test  Normal <5.7% Prediabetes 5.7-6.4%  Diabetes 6.5% or higher - adopted from ADA   consensus guidelines.     09/23/2019 7.1 (H) 0 - 5.6 % Final     Comment:     Normal <5.7% Prediabetes 5.7-6.4%  Diabetes 6.5% or higher - adopted from ADA   consensus guidelines.     05/17/2019 9.4 (H) 0 - 5.6 % Final     Comment:     Normal <5.7% Prediabetes 5.7-6.4%  Diabetes 6.5% or higher - adopted from ADA   consensus guidelines.           Last Eye Exam: 1-9-2019  Dilated Previously: Yes    What are you currently using to see?  glasses    Distance Vision Acuity: Satisfied with vision    Near Vision Acuity: Satisfied with vision while reading  with glasses    Eye Comfort: good  Do you use eye drops? : Yes: ?-artificial tear- would like prescription for drops  Occupation or Hobbies: none    Karey Braden Optometric Assistant, A.B.O.C.     Medical, surgical and family histories reviewed and updated 7/21/2020.       OBJECTIVE: See Ophthalmology exam    ASSESSMENT:    ICD-10-CM    1. Type 2 diabetes mellitus with diabetic polyneuropathy, without long-term current use of insulin (H)  E11.42 EYE EXAM (SIMPLE-NONBILLABLE)   2. Pseudophakia of both eyes  Z96.1 EYE EXAM (SIMPLE-NONBILLABLE)   3. Epiretinal membrane, bilateral  H35.373 EYE EXAM (SIMPLE-NONBILLABLE)   4. Dry eye syndrome of both eyes  H04.123 EYE EXAM (SIMPLE-NONBILLABLE)     polyethylene glycol-propylene glycol (SYSTANE) 0.4-0.3 % SOLN ophthalmic solution   5. Presbyopia  H52.4 REFRACTION   6. Regular astigmatism of both eyes  H52.223 REFRACTION   7. Hyperopia, bilateral  H52.03 REFRACTION      PLAN:    Enrique Pruitt aware  eye exam results will be sent to Lana Peguero.  Patient Instructions   There are not any signs of the diabetes  affecting the eyes today.  It is important that you get your eyes dilated once yearly and keep good control of your diabetes.    Referral to Vitreoretinal Surgery for evaluation- (patient declines)    Systane Ultra 1 drop both eyes 2-4 x day.    Eyeglass prescription given.    Return in 1 year for a complete eye exam or sooner if needed.    Lex Martin, OD

## 2020-07-21 NOTE — PATIENT INSTRUCTIONS
There are not any signs of the diabetes affecting the eyes today.  It is important that you get your eyes dilated once yearly and keep good control of your diabetes.    Referral to Vitreoretinal Surgery for evaluation- (patient declines)    Systane Ultra 1 drop both eyes 2-4 x day.    Eyeglass prescription given.    Return in 1 year for a complete eye exam or sooner if needed.    Lex Martin, OD    Ocusoft Hypochlor- spray solution onto cotton pad.  Close eyes and gently apply to eyelids and eyelashes using side to side motion.  Use at night.    The affects of the dilating drops last for 4- 6 hours.  You will be more sensitive to light and vision will be blurry up close.  Mydriatic sunglasses were given if needed.    Patient Education   Diabetes weakens the blood vessels all over the body, including the eyes. Damage to the blood vessels in the eyes can cause swelling or bleeding into part of the eye (called the retina). This is called diabetic retinopathy ([facility]-thee). If not treated, this disease can cause vision loss or blindness.   Symptoms may include blurred or distorted vision, but many people have no symptoms. It's important to see your eye doctor regularly to check for problems.   Early treatment and good control can help protect your vision. Here are the things you can do to help prevent vision loss:      1. Keep your blood sugar levels under tight control.      2. Bring high blood pressure under control.      3. No smoking.      4. Have yearly dilated eye exams.       Return in 1 year for a complete eye exam or sooner if needed.    Lex Martin, OD    The affects of the dilating drops last for 4- 6 hours.  You will be more sensitive to light and vision will be blurry up close.  Mydriatic sunglasses were given if needed.      Optometry Providers       Clinic Locations                                 Telephone Number   Dr. Arianne Valle     [place] [place] [phone]     [facility] Hours:                [facility] Hours:       [facility] Hours:   [address]   [address] [address]     [address] [facility]  [place] [address]   [place] [address]    [place] [address]  Phone: [phone]                    Phone: [phone]     Phone: [phone]                      Monday 8:00-7:00                          Monday 8:00-7:00                          Monday 8:00-7:00              Tuesday 8:00-6:00                          Tuesday 8:00-7:00                          Tuesday 8:00-7:00              Wednesday 8:00-6:00                  Wednesday 8:00-7:00                   Wednesday 8:00-7:00      Thursday 8:00-6:00                        Thursday 8:00-7:00                         Thursday 8:00-7:00            Friday 8:00-5:00                              Friday 8:00-5:00                              Friday 8:00-5:00    [facility] Hours:   [address] [place] [address]  [phone]    Monday 8:00-7:00  Tuesday 8:00-7:00  Wednesday 8:00-7:00  Thursday 8:00-7:00  Friday 8:00-5:00  Please log on to [email or web address] to order your contact lenses.  The link is found on the [facility] page.  As always, Thank you for trusting us with your health care needs!

## 2020-07-30 ENCOUNTER — APPOINTMENT (OUTPATIENT)
Dept: OPTOMETRY | Facility: CLINIC | Age: 75
End: 2020-07-30
Payer: COMMERCIAL

## 2020-07-30 PROCEDURE — 92341 FIT SPECTACLES BIFOCAL: CPT | Performed by: OPTOMETRIST

## 2020-08-17 DIAGNOSIS — L30.9 DERMATITIS: ICD-10-CM

## 2020-08-19 RX ORDER — TRIAMCINOLONE ACETONIDE 1 MG/G
CREAM TOPICAL
Qty: 60 G | Refills: 0 | Status: SHIPPED | OUTPATIENT
Start: 2020-08-19 | End: 2020-11-10

## 2020-08-19 NOTE — TELEPHONE ENCOUNTER
Prescription approved per [facility] Refill Protocol.    MONIQUE EdwardsN, RN  [facility]

## 2020-09-23 ENCOUNTER — TELEPHONE (OUTPATIENT)
Dept: FAMILY MEDICINE | Facility: CLINIC | Age: 75
End: 2020-09-23

## 2020-09-23 DIAGNOSIS — G89.4 CHRONIC PAIN SYNDROME: ICD-10-CM

## 2020-09-23 DIAGNOSIS — E11.42 DIABETIC POLYNEUROPATHY ASSOCIATED WITH TYPE 2 DIABETES MELLITUS (H): ICD-10-CM

## 2020-09-23 NOTE — LETTER
[address]  [address]  [phone]    September 25, 2020      Enrique Pruitt  [address] [address]      Hi Enrique    We have refilled your hydrocodone.   We will need to see you for an office visit, before any additional refills can be given.  Please call [phone] to schedule this appointment.      Thank you,    [facility]

## 2020-09-23 NOTE — TELEPHONE ENCOUNTER
.Reason for call:  Medication   If this is a refill request, has the caller requested the refill from the pharmacy already? No  Will the patient be using a [facility]? No  Name of the pharmacy and phone number for the current request: [facility] [place]    Name of the medication requested: hydrocodone    Other request: fill script    Phone number to reach patient:  Home number on file [phone] (home)    Best Time:  anytime    Can we leave a detailed message on this number?  YES    Travel screening: Negative

## 2020-09-25 RX ORDER — HYDROCODONE BITARTRATE AND ACETAMINOPHEN 5; 325 MG/1; MG/1
1 TABLET ORAL 3 TIMES DAILY PRN
Qty: 40 TABLET | Refills: 0 | Status: SHIPPED | OUTPATIENT
Start: 2020-09-25 | End: 2020-10-06

## 2020-09-25 NOTE — TELEPHONE ENCOUNTER
Routing refill request to provider for review/approval because:  Drug not on the [facility] refill protocol       Controlled Substance Refill Request for Norco  Problem List Complete:  Yes   checked in past 3 months?  Yes 9/25/20       Patient is followed by Lana Peguero MD for ongoing prescription of pain medication.  All refills should be approved by this provider, or covering partner.     Medication(s): vicodin 5.   Maximum quantity per month: 40-60 = < 90 MED  Narcan n/a  Clinic visit frequency required: Q 6  months      Controlled substance agreement: 5/17/19  Encounter-Level CSA - 04/25/2017:    Controlled Substance Agreement - Scan on 5/2/2017  8:46 AM: CONTROLLED SUBSTANCE AGREEMENT      Patient-Level CSA:    Controlled Substance Agreement - Opioid - Scan on 5/20/2019  6:59 AM         Pain Clinic evaluation in the past: No     DIRE Total Score(s):  No flowsheet data found.     Last MNP website verification: 9/25/2020    RX [place] reviewed:  reviewed- no concerns    [place]  [email or web address]

## 2020-09-25 NOTE — TELEPHONE ENCOUNTER
Med refilled in Dr. Peguero absence according to problem list overview       Please assist with scheduling face to face visit with Dr. Peguero - last virtual visit 7/14/22020 stated labs due and follow up with him within 3 months - overdue for labs as well.

## 2020-10-06 ENCOUNTER — OFFICE VISIT (OUTPATIENT)
Dept: FAMILY MEDICINE | Facility: CLINIC | Age: 75
End: 2020-10-06
Payer: COMMERCIAL

## 2020-10-06 VITALS
TEMPERATURE: 96.9 F | SYSTOLIC BLOOD PRESSURE: 137 MMHG | WEIGHT: 161 LBS | BODY MASS INDEX: 34.73 KG/M2 | HEART RATE: 114 BPM | DIASTOLIC BLOOD PRESSURE: 82 MMHG | OXYGEN SATURATION: 100 % | HEIGHT: 57 IN

## 2020-10-06 DIAGNOSIS — I10 ESSENTIAL HYPERTENSION WITH GOAL BLOOD PRESSURE LESS THAN 140/90: ICD-10-CM

## 2020-10-06 DIAGNOSIS — R06.2 WHEEZING: ICD-10-CM

## 2020-10-06 DIAGNOSIS — E11.42 DIABETIC POLYNEUROPATHY ASSOCIATED WITH TYPE 2 DIABETES MELLITUS (H): ICD-10-CM

## 2020-10-06 DIAGNOSIS — E11.42 TYPE 2 DIABETES MELLITUS WITH DIABETIC POLYNEUROPATHY, WITHOUT LONG-TERM CURRENT USE OF INSULIN (H): Primary | ICD-10-CM

## 2020-10-06 DIAGNOSIS — N18.31 STAGE 3A CHRONIC KIDNEY DISEASE (H): ICD-10-CM

## 2020-10-06 DIAGNOSIS — G89.4 CHRONIC PAIN SYNDROME: ICD-10-CM

## 2020-10-06 DIAGNOSIS — E78.5 HYPERLIPIDEMIA LDL GOAL <70: ICD-10-CM

## 2020-10-06 LAB
AMPHETAMINES UR QL: NOT DETECTED NG/ML
BARBITURATES UR QL SCN: NOT DETECTED NG/ML
BENZODIAZ UR QL SCN: NOT DETECTED NG/ML
BUPRENORPHINE UR QL: NOT DETECTED NG/ML
CANNABINOIDS UR QL: NOT DETECTED NG/ML
COCAINE UR QL SCN: NOT DETECTED NG/ML
D-METHAMPHET UR QL: NOT DETECTED NG/ML
HBA1C MFR BLD: 7.1 % (ref 0–5.6)
METHADONE UR QL SCN: NOT DETECTED NG/ML
OPIATES UR QL SCN: NOT DETECTED NG/ML
OXYCODONE UR QL SCN: NOT DETECTED NG/ML
PCP UR QL SCN: NOT DETECTED NG/ML
PROPOXYPH UR QL: NOT DETECTED NG/ML
TRICYCLICS UR QL SCN: NOT DETECTED NG/ML

## 2020-10-06 PROCEDURE — 83036 HEMOGLOBIN GLYCOSYLATED A1C: CPT | Performed by: FAMILY MEDICINE

## 2020-10-06 PROCEDURE — 80306 DRUG TEST PRSMV INSTRMNT: CPT | Performed by: FAMILY MEDICINE

## 2020-10-06 PROCEDURE — 99214 OFFICE O/P EST MOD 30 MIN: CPT | Performed by: FAMILY MEDICINE

## 2020-10-06 PROCEDURE — 36415 COLL VENOUS BLD VENIPUNCTURE: CPT | Performed by: FAMILY MEDICINE

## 2020-10-06 RX ORDER — AMLODIPINE BESYLATE 10 MG/1
10 TABLET ORAL DAILY
Qty: 90 TABLET | Refills: 1 | Status: SHIPPED | OUTPATIENT
Start: 2020-10-06 | End: 2021-03-09

## 2020-10-06 RX ORDER — HYDROCODONE BITARTRATE AND ACETAMINOPHEN 5; 325 MG/1; MG/1
1 TABLET ORAL 3 TIMES DAILY PRN
Qty: 50 TABLET | Refills: 0 | Status: SHIPPED | OUTPATIENT
Start: 2020-10-06 | End: 2020-11-15

## 2020-10-06 RX ORDER — ALBUTEROL SULFATE 90 UG/1
2 AEROSOL, METERED RESPIRATORY (INHALATION) EVERY 4 HOURS PRN
Qty: 1 INHALER | Refills: 0 | Status: SHIPPED | OUTPATIENT
Start: 2020-10-06 | End: 2020-10-22

## 2020-10-06 ASSESSMENT — MIFFLIN-ST. JEOR: SCORE: 1094.17

## 2020-10-06 ASSESSMENT — PAIN SCALES - GENERAL: PAINLEVEL: MODERATE PAIN (5)

## 2020-10-06 NOTE — PROGRESS NOTES
Subjective     Enrique Pruitt is a 75 year old female who presents to clinic today for the following health issues:    HPI           Diabetes Follow-up    How often are you checking your blood sugar? One toTwo times daily  Blood sugar testing frequency justification:    What time of day are you checking your blood sugars (select all that apply)?  Before meals  Have you had any blood sugars above 200?  No  Have you had any blood sugars below 70?  Yes     What symptoms do you notice when your blood sugar is low?  Lethargy    What concerns do you have today about your diabetes? None     Do you have any of these symptoms? (Select all that apply)  Numbness in feet and Burning in feet      BP Readings from Last 2 Encounters:   03/18/20 129/67   02/11/20 105/71     Hemoglobin A1C (%)   Date Value   02/11/2020 9.6 (H)   09/23/2019 7.1 (H)     LDL Cholesterol Calculated (mg/dL)   Date Value   02/11/2020 44   09/23/2019 106 (H)                 How many servings of fruits and vegetables do you eat daily?  4 or more    On average, how many sweetened beverages do you drink each day (Examples: soda, juice, sweet tea, etc.  Do NOT count diet or artificially sweetened beverages)?   1    How many days per week do you exercise enough to make your heart beat faster? N/a     How many minutes a day do you exercise enough to make your heart beat faster? N/a     How many days per week do you miss taking your medication? 0    SUBJECTIVE:  Here today with her daughter in follow-up of diabetes, hypertension, lipids, polyneuropathy.  Daughter asks about increasing the dosage of the hydrocodone for leg and foot pain.  But I discussed that because of her age we really do not want to use it that frequently.  She is due for an update of her controlled substance agreement and urine drug screen.  Though I do not have any concerns about misuse or diversion.  Says she is generally doing well other than aching into her legs and feet.  Does not  "get a lot of exercise but tries to watch her diet.  Diabetes has not been well controlled with recent lab work.  But she says she is faithful with the medications.  Sometimes a bit wheezy and we discussed using albuterol to help with this.    Review of systems otherwise negative.  Past medical, family, and social history reviewed and updated in chart.    OBJECTIVE:  /82 (BP Location: Left arm, Patient Position: Chair, Cuff Size: Adult Regular)   Pulse 114   Temp 96.9  F (36.1  C) (Tympanic)   Ht 1.448 m (4' 9\")   Wt 73 kg (161 lb)   LMP  (LMP Unknown)   SpO2 100%   Breastfeeding No   BMI 34.84 kg/m    Alert, pleasant, upbeat, and in no apparent discomfort.  S1 and S2 normal, no murmurs, clicks, gallops or rubs. Regular rate and rhythm. Chest is clear; no wheezes or rales. No edema or JVD.  Past labs reviewed with the patient.     ASSESSMENT / PLAN:  (E11.42) Type 2 diabetes mellitus with diabetic polyneuropathy, without long-term current use of insulin (H)  (primary encounter diagnosis)  Comment: Has not been well controlled but patient says she is taking medications as prescribed.  Recheck and adjust as needed.  Plan: metFORMIN (GLUCOPHAGE) 500 MG tablet            (E11.42) Diabetic polyneuropathy associated with type 2 diabetes mellitus  Comment: First issue is to get her sugars down and I discussed this with patient and her daughter.  Things may fall into place then but she is on Lyrica as well.  Plan: HYDROcodone-acetaminophen (NORCO) 5-325 MG         tablet            (I10) Essential hypertension with goal blood pressure less than 140/90  Comment: Stable on current regimen.  Continue same plan and routine follow-up.   Plan: amLODIPine (NORVASC) 10 MG tablet            (N18.31) Stage 3a chronic kidney disease  Comment: Continue to monitor on a routine basis  Plan:     (E78.5) Hyperlipidemia LDL goal <70  Comment: Stable and monitored.  Continue same routine  Plan:     (G89.4) Chronic pain " syndrome  Comment: Updated controlled substance agreement today and urine drug screen.  Plan:     Follow up 3 months  TIMMY Peguero MD    (Chart documentation completed in part with Dragon voice-recognition software.  Even though reviewed some grammatical, spelling, and word errors may remain.)

## 2020-10-06 NOTE — LETTER
[facility]  10/06/20    Patient: Enrique Pruitt  YOB: 1945  Medical Record Number: [id number]                                                                  Opioid / Opioid Plus Controlled Substance Agreement    I understand that my care provider has prescribed an opioid (narcotic) controlled substance to help manage my condition(s). I am taking this medicine to help me function or work. I know this is strong medicine, and that it can cause serious side effects. Opioid medicine can be sedating, addicting and may cause a dependency on the drug. They can affect my ability to drive or think, and cause depression. They need to be taken exactly as prescribed. Combining opioids with certain medicines or chemicals (such as cocaine, sedatives and tranquilizers, sleeping pills, meth) can be dangerous or even fatal. Also, if I stop opioids suddenly, I may have severe withdrawal symptoms. Last, I understand that opioids do not work for all types of pain nor for all patients. If not helpful, I may be asked to stop them.      The risks, benefits, and side effects of these medicine(s) were explained to me. I agree that:    1. I will take part in other treatments as advised by my care team. This may be psychiatry or counseling, physical therapy, behavioral therapy, group treatment or a referral to a pain clinic. I will reduce or stop my medicine when my care team tells me to do so.  2. I will take my medicines as prescribed. I will not change the dose or schedule unless my care team tells me to. There will be no refills if I  run out early.   I may be contactedwithout warning and asked to complete a urine drug test or pill count at any time.   3. I will keep all my appointments, and understand this is part of the monitoring of opioids. My care team may require an office visit for EVERY opioid/controlled substance refill. If I miss appointments or don t follow instructions, my care  team may stop my medicine.  4. I will not ask other providers to prescribe controlled substances, and I will not accept controlled substances from other people. If I need another prescribed controlled substance for a new reason, I will tell my care team within 1 business day.  5. I will use one pharmacy to fill all of my controlled substance prescriptions, and it is up to me to make sure that I do not run out of my medicines on weekends or holidays. If my care team is willing to refill my opioid prescription without a visit, I must request refills only during office hours, refills may take up to 3 days to process, and it may take up to 5 to 7 days for my medicine to be mailed and ready at my pharmacy. Prescriptions will not be mailed anywhere except my pharmacy.        [id number]  Rev 12/18         Registration to scan to EHR                             Page 1 of 2               Controlled Substance Agreement [facility]  10/06/20  Patient: Enrique Pruitt  YOB: 1945  Medical Record Number: [id number]                                                                  6. I am responsible for my prescriptions. If the medicine/prescription is lost or stolen, it will not be replaced. I also agree not to share controlled substance medicines with anyone.  7. I agree to not use ANY illegal or recreational drugs. This includes marijuana, cocaine, bath salts or other drugs. I agree not to use alcohol unless my care team says I may.          I agree to give urine samples whenever asked. If I don t give a urine sample, the care team may stop my medicine.    8. If I enroll in the [place] Medical Marijuana program, I will tell my care team. I will also sign an agreement to share my medical records with my care team.   9. I will bring in my list of medicines (or my medicine bottles) each time I come to the clinic.   10. I will tell my care team right away if I become pregnant  or have a new medical problem treated outside of my regular clinic.  11. I understand that this medicine can affect my thinking and judgment. It may be unsafe for me to drive, use machinery and do dangerous tasks. I will not do any of these things until I know how the medicine affects me. If my dose changes, I will wait to see how it affects me. I will contact my care team if I have concerns about medicine side effects.    I understand that if I do not follow any of the conditions above, my prescriptions or treatment may be stopped.      I agree that my provider, clinic care team, and pharmacy may work with any city, state or federal law enforcement agency that investigates the misuse, sale, or other diversion of my controlled medicine. I will allow my provider to discuss my care with or share a copy of this agreement with any other treating provider, pharmacy or emergency room where I receive care. I agree to give up (waive) any right of privacy or confidentiality with respect to these consents.     I have read this agreement and have asked questions about anything I did not understand.      ________________________________________________________________________  Patient signature - Date/Time -  Enrique Pruitt                                      ________________________________________________________________________  Witness signature                                                            ________________________________________________________________________  Provider signature - Lana Peguero MD      [id number]  Rev 12/18         Registration to scan to EHR                         Page 2 of 2                   Controlled Substance Agreement Opioid           Page 1 of 2  Opioid Pain Medicines (also known as Narcotics)  What You Need to Know    What are opioids?   Opioids are pain medicines that must be prescribed by a doctor.  They are also known as narcotics.    Examples are:     morphine (MS  Contin, Glenna)    oxycodone (Oxycontin)    oxycodone and acetaminophen (Percocet)    hydrocodone and acetaminophen (Vicodin, Norco)     fentanyl patch (Duragesic)     hydromorphone (Dilaudid)     methadone     What do opioids do well?   Opioids are best for short-term pain after a surgery or injury. They also work well for cancer pain. Unlike other pain medicines, they do not cause liver or kidney failure or ulcers. They may help some people with long-lasting (chronic) pain.     What do opioids NOT do well?   Opioids never get rid of pain entirely, and they do not work well for most patients with chronic pain. Opioids do not reduce swelling, one of the causes of pain. They also don t work well for nerve pain.                           For informational purposes only.  Not to replace the advice of your care provider.  Copyright [address] [facility]. All right reserved. "[employer] 025512-Nhz 02/18.      Page 2 of 2    Risks and side effects   Talk to your doctor before you start or decide to keep taking one of these medicines. Side effects include:    Lowering your breathing rate enough to cause death    Overdose, including death, especially if taking higher than prescribed doses    Long-term opioid use    Worse depression symptoms; less pleasure in things you usually enjoy    Feeling tired or sluggish    Slower thoughts or cloudy thinking    Being more sensitive to pain over time; pain is harder to control    Trouble sleeping or restless sleep    Changes in hormone levels (for example, less testosterone)    Changes in sex drive or ability to have sex    Constipation    Unsafe driving    Itching and sweating    Feeling dizzy    Nausea, vomiting and dry mouth    What else should I know about opioids?  When someone takes opioids for too long or too often, they become dependent. This means that if you stop or reduce the medicine too quickly, you will have withdrawal symptoms.    Dependence is not the same as  addiction. Addiction is when people keep using a substance that harms their body, their mind or their relations with others. If you have a history of drug or alcohol abuse, taking opioids can cause a relapse.    Over time, opioids don t work as well. Most people will need higher and higher doses. The higher the dose, the more serious the side effects. We don t know the long-term effects of opioids.      Prescribed opioids aren't the best way to manage chronic pain    Other ways to manage pain include:      Ibuprofen or acetaminophen.  You should always try this first.      Treat health problems that may be causing pain.      acupuncture or massage, deep breathing, meditation, visual imagery, aromatherapy.      Use heat or ice at the pain site      Physical therapy and exercise      Stop smoking      See a counselor or therapist                                                  People who have used opioids for a long time may have a lower quality of life, worse depression, higher levels of pain and more visits to doctors.    Never share your opioids with others. Be sure to store opioids in a secure place, locked if possible.Young children can easily swallow them and overdose.     You can overdose on opioids.  Signs of overdose include decrease or loss of consciousness, slowed breathing, trouble waking and blue lips.  If someone is worried about overdose, they should call 911.    If you are at risk for overdose, you may get naloxone (Narcan, a medicine that reverses the effects of opioids.  If you overdose, a friend or family member can give you Narcan while waiting for the ambulance.  They need to know the signs of overdose and how to give Narcan.    While you're taking opioids:    Don't use alcohol or street drugs. Taking them together can cause death.    Don't take any of these medicines unless your doctor says its okay.  Taking these with opioids can cause death.    Benzodiazepines (such as lorazepam         or  diazepam)    Muscle relaxers (such as cyclobenzaprine)    sleeping pills    other opioids    Safe disposal of opioids  Find your area drug take-back program, your pharmacy mail-back program, buy a special disposal bag (such as Deterra) from your pharmacy or flush them down the toilet.  Use the guidelines at:  www.fda.gov/drugs/resourcesforyou

## 2020-10-20 DIAGNOSIS — R06.2 WHEEZING: ICD-10-CM

## 2020-10-22 RX ORDER — ALBUTEROL SULFATE 90 UG/1
2 AEROSOL, METERED RESPIRATORY (INHALATION) EVERY 4 HOURS PRN
Qty: 8.5 G | Refills: 0 | Status: SHIPPED | OUTPATIENT
Start: 2020-10-22 | End: 2020-11-08

## 2020-11-04 DIAGNOSIS — R06.2 WHEEZING: ICD-10-CM

## 2020-11-06 NOTE — TELEPHONE ENCOUNTER
Routing refill request to provider for review/approval because:  Associated diagnosis not on protocol  No lung problems on problem list   Pattie Mejía RN  [facility]

## 2020-11-07 DIAGNOSIS — L30.9 DERMATITIS: ICD-10-CM

## 2020-11-08 RX ORDER — ALBUTEROL SULFATE 90 UG/1
2 AEROSOL, METERED RESPIRATORY (INHALATION) EVERY 4 HOURS PRN
Qty: 8.5 G | Refills: 0 | Status: SHIPPED | OUTPATIENT
Start: 2020-11-08 | End: 2020-11-30

## 2020-11-10 RX ORDER — TRIAMCINOLONE ACETONIDE 1 MG/G
CREAM TOPICAL
Qty: 60 G | Refills: 2 | Status: SHIPPED | OUTPATIENT
Start: 2020-11-10 | End: 2021-03-19

## 2020-11-10 NOTE — TELEPHONE ENCOUNTER
Prescription approved per [facility] [place]ill Protocol.  Pattie Mejía RN  [facility]

## 2020-11-12 ENCOUNTER — PATIENT OUTREACH (OUTPATIENT)
Dept: GERIATRIC MEDICINE | Facility: CLINIC | Age: 75
End: 2020-11-12

## 2020-11-12 NOTE — PROGRESS NOTES
[facility] Care Coordination Contact    Called adult daughter Shanika to complete six month assessment and left a message requesting a return call.    Regine Stapleton RN, PHN  [facility]  [phone]

## 2020-11-13 DIAGNOSIS — E11.42 DIABETIC POLYNEUROPATHY ASSOCIATED WITH TYPE 2 DIABETES MELLITUS (H): ICD-10-CM

## 2020-11-13 NOTE — TELEPHONE ENCOUNTER
Requested Prescriptions   Pending Prescriptions Disp Refills     HYDROcodone-acetaminophen (NORCO) 5-325 MG tablet 50 tablet 0     Sig: Take 1 tablet by mouth 3 times daily as needed for severe pain       There is no refill protocol information for this order        Routing refill request to provider for review/approval because:  Drug not on the [facility] refill protocol

## 2020-11-15 RX ORDER — HYDROCODONE BITARTRATE AND ACETAMINOPHEN 5; 325 MG/1; MG/1
1 TABLET ORAL 3 TIMES DAILY PRN
Qty: 50 TABLET | Refills: 0 | Status: SHIPPED | OUTPATIENT
Start: 2020-11-15 | End: 2020-12-20

## 2020-11-16 ENCOUNTER — PATIENT OUTREACH (OUTPATIENT)
Dept: GERIATRIC MEDICINE | Facility: CLINIC | Age: 75
End: 2020-11-16

## 2020-11-16 NOTE — PROGRESS NOTES
[facility] Care Coordination Contact    Called member and adult daughter Oudomphone to complete six month assessment and left a message requesting a return call.    Regine Stapleton RN, PHN  [facility]  [phone]

## 2020-11-17 ENCOUNTER — PATIENT OUTREACH (OUTPATIENT)
Dept: GERIATRIC MEDICINE | Facility: CLINIC | Age: 75
End: 2020-11-17

## 2020-11-17 NOTE — PROGRESS NOTES
[facility] Care Coordination Contact      [facility] Six-Month Telephone Assessment    6 month telephone assessment completed on 11/17/20.    ER visits: No  Hospitalizations: No  TCU stays: No  Significant health status changes: None reported.   Falls/Injuries: No  ADL/IADL changes: No  Changes in services: No    Caregiver Assessment follow up:  N/A    Goals: See POC in chart for goal progress documentation. Daughter, Oudomphone reports member's health is stable and at baseline. Current support and services are meeting needs. POC reviewed and updated.     Will see member in 6 months for an annual health risk assessment.   Encouraged member to call CC with any questions or concerns in the meantime.     Regine Stapleton RN, PHN  [facility]  [phone]

## 2020-11-20 DIAGNOSIS — E11.42 DIABETIC POLYNEUROPATHY ASSOCIATED WITH TYPE 2 DIABETES MELLITUS (H): ICD-10-CM

## 2020-11-20 NOTE — TELEPHONE ENCOUNTER
Reason for Call:  Medication or medication refill:    Do you use a [facility]?  Name of the pharmacy and phone number for the current request:  [id number] - [facility] [address] [place] [address] AT [facility]    Name of the medication requested: HYDROcodone-acetaminophen (NORCO) 5-325 MG tablet    Can we leave a detailed message on this number? YES    Phone number patient can be reached at: Home number on file [phone] (home)    Best Time: anytime    Call taken on 11/20/2020 at 10:18 AM by Chano Deleon

## 2020-11-21 RX ORDER — HYDROCODONE BITARTRATE AND ACETAMINOPHEN 5; 325 MG/1; MG/1
1 TABLET ORAL 3 TIMES DAILY PRN
Qty: 50 TABLET | Refills: 0 | OUTPATIENT
Start: 2020-11-21

## 2020-12-13 DIAGNOSIS — R06.2 WHEEZING: ICD-10-CM

## 2020-12-16 DIAGNOSIS — E11.42 DIABETIC POLYNEUROPATHY ASSOCIATED WITH TYPE 2 DIABETES MELLITUS (H): ICD-10-CM

## 2020-12-16 DIAGNOSIS — E11.42 TYPE 2 DIABETES MELLITUS WITH DIABETIC POLYNEUROPATHY, WITHOUT LONG-TERM CURRENT USE OF INSULIN (H): ICD-10-CM

## 2020-12-16 RX ORDER — ALBUTEROL SULFATE 90 UG/1
2 AEROSOL, METERED RESPIRATORY (INHALATION) EVERY 4 HOURS PRN
Qty: 8.5 G | Refills: 0 | Status: SHIPPED | OUTPATIENT
Start: 2020-12-16 | End: 2021-01-03

## 2020-12-16 NOTE — TELEPHONE ENCOUNTER
Reason for Call:  Medication or medication refill:    Do you use a [facility]?  Name of the pharmacy and phone number for the current request:  See Attached    Name of the medication requested:    HYDROcodone-acetaminophen (NORCO) 5-325 MG tablet         Other request: please call patient when sent to the pharmacy     Can we leave a detailed message on this number? YES    Phone number patient can be reached at: Home number on file [phone] (home)    Best Time: Anytime    Call taken on 12/16/2020 at 12:27 PM by Cathy Gamez

## 2020-12-18 DIAGNOSIS — E11.42 TYPE 2 DIABETES MELLITUS WITH DIABETIC POLYNEUROPATHY, WITHOUT LONG-TERM CURRENT USE OF INSULIN (H): ICD-10-CM

## 2020-12-20 RX ORDER — HYDROCODONE BITARTRATE AND ACETAMINOPHEN 5; 325 MG/1; MG/1
1 TABLET ORAL 3 TIMES DAILY PRN
Qty: 50 TABLET | Refills: 0 | Status: SHIPPED | OUTPATIENT
Start: 2020-12-20 | End: 2021-01-19

## 2020-12-20 RX ORDER — PIOGLITAZONEHYDROCHLORIDE 45 MG/1
45 TABLET ORAL DAILY
Qty: 90 TABLET | Refills: 0 | Status: SHIPPED | OUTPATIENT
Start: 2020-12-20 | End: 2021-03-22

## 2020-12-20 RX ORDER — GLIMEPIRIDE 4 MG/1
TABLET ORAL
Qty: 180 TABLET | Refills: 0 | Status: SHIPPED | OUTPATIENT
Start: 2020-12-20 | End: 2021-03-19

## 2020-12-20 NOTE — TELEPHONE ENCOUNTER
"Requested Prescriptions   Pending Prescriptions Disp Refills     glimepiride (AMARYL) 4 MG tablet 180 tablet 1     Sig: TAKE 1 TABLET BY MOUTH TWICE DAILY       Sulfonylurea Agents Passed - 12/18/2020  7:56 PM        Passed - Patient has documented A1c within the specified period of time.     If HgbA1C is 8 or greater, it needs to be on file within the past 3 months.  If less than 8, must be on file within the past 6 months.     Recent Labs   Lab Test 10/06/20  1422   A1C 7.1*             Passed - Medication is active on med list        Passed - Patient is age 18 or older        Passed - No active pregnancy on record        Passed - Patient has a recent creatinine (normal) within the past 12 mos.     Recent Labs   Lab Test 02/11/20  1342   CR 1.01       Ok to refill medication if creatinine is low          Passed - Patient has not had a positive pregnancy test within the past 12 mos.        Passed - Recent (6 mo) or future (30 days) visit within the authorizing provider's specialty     Patient had office visit in the last 6 months or has a visit in the next 30 days with authorizing provider or within the authorizing provider's specialty.  See \"Patient Info\" tab in inbasket, or \"Choose Columns\" in Meds & Orders section of the refill encounter.               sitagliptin (JANUVIA) 50 MG tablet 90 tablet 1     Sig: Take 1 tablet (50 mg) by mouth daily       DPP4 Inhibitors Protocol Passed - 12/18/2020  7:56 PM        Passed - HgbA1C in past 3 or 6 months     If HgbA1C is 8 or greater, it needs to be on file within the past 3 months.  If less than 8, must be on file within the past 6 months.     Recent Labs   Lab Test 10/06/20  1422   A1C 7.1*             Passed - Medication is active on med list        Passed - Patient is age 18 or older        Passed - No active pregnancy on record        Passed - Normal serum creatinine in past 12 months     Recent Labs   Lab Test 02/11/20  1342   CR 1.01       Ok to refill medication " "if creatinine is low          Passed - No positive pregnancy test in past 12 months        Passed - Recent (6 mo) or future (30 days) visit within the authorizing provider's specialty     Patient had office visit in the last 6 months or has a visit in the next 30 days with authorizing provider.  See \"Patient Info\" tab in inbasket, or \"Choose Columns\" in Meds & Orders section of the refill encounter.               Signed Prescriptions:                        Disp   Refills    glimepiride (AMARYL) 4 MG tablet           180 ta*0        Sig: TAKE 1 TABLET BY MOUTH TWICE DAILY  Authorizing Provider: SIDRA GLORIA  Ordering User: CONCHA SOLIS    sitagliptin (JANUVIA) 50 MG tablet         90 tab*0        Sig: Take 1 tablet (50 mg) by mouth daily  Authorizing Provider: SIDRA GLORIA  Ordering User: CONCHA SOLIS      "

## 2020-12-30 DIAGNOSIS — R06.2 WHEEZING: ICD-10-CM

## 2021-01-03 RX ORDER — ALBUTEROL SULFATE 90 UG/1
2 AEROSOL, METERED RESPIRATORY (INHALATION) EVERY 4 HOURS PRN
Qty: 8.5 G | Refills: 4 | Status: SHIPPED | OUTPATIENT
Start: 2021-01-03 | End: 2022-04-18

## 2021-01-19 DIAGNOSIS — E11.42 DIABETIC POLYNEUROPATHY ASSOCIATED WITH TYPE 2 DIABETES MELLITUS (H): ICD-10-CM

## 2021-01-19 DIAGNOSIS — G89.4 CHRONIC PAIN SYNDROME: ICD-10-CM

## 2021-01-19 RX ORDER — HYDROCODONE BITARTRATE AND ACETAMINOPHEN 5; 325 MG/1; MG/1
1 TABLET ORAL 3 TIMES DAILY PRN
Qty: 50 TABLET | Refills: 0 | Status: SHIPPED | OUTPATIENT
Start: 2021-01-19 | End: 2021-03-01

## 2021-01-19 NOTE — TELEPHONE ENCOUNTER
Controlled Substance Refill Request for Norco  Problem List Complete:  Yes  Chronic pain syndrome  Problem Detail    Noted:  12/14/2016   Priority:  Medium   Overview Addendum 10/6/2020  2:40 PM by Lana Peguero MD   Patient is followed by Lana Peguero MD for ongoing prescription of pain medication.  All refills should be approved by this provider, or covering partner.     Medication(s): vicodin 5.   Maximum quantity per month: 40-60 = < 90 MED  Narcan n/a  Clinic visit frequency required: Q 6  months      Controlled substance agreement: 10/6/2020  Encounter-Level CSA - 04/25/2017:    Controlled Substance Agreement - Scan on 5/2/2017  8:46 AM: CONTROLLED SUBSTANCE AGREEMENT      Patient-Level CSA:    Controlled Substance Agreement - Opioid - Scan on 5/20/2019  6:59 AM         Pain Clinic evaluation in the past: No     DIRE Total Score(s):  No flowsheet data found.     Last [place]MP website verification: 01/19/21    [email or web address]     checked in past 3 months?  Yes-01/19/21   RX monitoring program ([place]) reviewed:  reviewed- no concerns  [place] profile:  [email or web address]  Last Written Prescription Date:  12/20/20  Last Fill Quantity: 50 tablets  # refills: 0   Last office visit: 3/18/2020 with prescribing provider:  10/6/20   Future Office Visit:  None      Miki Nova RN, BSN, PHN    Miki Nova RN, BSN, PHN

## 2021-01-19 NOTE — TELEPHONE ENCOUNTER
Reason for Call:  Medication or medication refill:    Do you use a [facility]? [place] of the pharmacy and phone number for the current request:    [facility]  [address] [place] [address]    Name of the medication requested: Hydrocodone    Other request: N/A    Can we leave a detailed message on this number? YES    Phone number patient can be reached at: Home number on file [phone] (home)    Best Time: Anytime    Call taken on 1/19/2021 at 11:00 AM by Bobbi Smith

## 2021-02-23 ENCOUNTER — IMMUNIZATION (OUTPATIENT)
Dept: PEDIATRICS | Facility: CLINIC | Age: 76
End: 2021-02-23
Payer: COMMERCIAL

## 2021-02-23 DIAGNOSIS — E11.42 DIABETIC POLYNEUROPATHY ASSOCIATED WITH TYPE 2 DIABETES MELLITUS (H): ICD-10-CM

## 2021-02-23 PROCEDURE — 91300 PR COVID VAC PFIZER DIL RECON 30 MCG/0.3 ML IM: CPT

## 2021-02-23 PROCEDURE — 0001A PR COVID VAC PFIZER DIL RECON 30 MCG/0.3 ML IM: CPT

## 2021-02-23 RX ORDER — HYDROCODONE BITARTRATE AND ACETAMINOPHEN 5; 325 MG/1; MG/1
1 TABLET ORAL 3 TIMES DAILY PRN
Qty: 50 TABLET | Refills: 0 | OUTPATIENT
Start: 2021-02-23

## 2021-02-23 NOTE — TELEPHONE ENCOUNTER
Controlled Substance Refill Request for Norco  Problem List Complete:  Yes  Chronic pain syndrome  Problem Detail    Noted:  12/14/2016   Priority:  Medium   Overview Addendum 1/19/2021 12:56 PM by Miki Nova RN   Patient is followed by Lana Peguero MD for ongoing prescription of pain medication.  All refills should be approved by this provider, or covering partner.     Medication(s): vicodin 5.   Maximum quantity per month: 40-60 = < 90 MED  Narcan n/a  Clinic visit frequency required: Q 6  months      Controlled substance agreement: 10/6/2020  Encounter-Level CSA - 04/25/2017:    Controlled Substance Agreement - Scan on 5/2/2017  8:46 AM: CONTROLLED SUBSTANCE AGREEMENT      Patient-Level CSA:    Controlled Substance Agreement - Opioid - Scan on 5/20/2019  6:59 AM         Pain Clinic evaluation in the past: No     DIRE Total Score(s):  No flowsheet data found.     Last [place]NPMP website verification: 01/19/21    [email or web address]    Previous Version    checked in past 3 months?  Yes 1/19/21   RX monitoring program ([place]) reviewed:  not reviewed/not due - last done on 1/19/21  [place]NPMP profile:  [email or web address]  Last Written Prescription Date:  1/19/21  Last Fill Quantity: 50 tablets  # refills: 0   Last office visit: 3/18/2020 with prescribing provider:  10/6/20   Future Office Visit:  None        Miki Nova RN, BSN, PHN

## 2021-02-23 NOTE — TELEPHONE ENCOUNTER
Refill denied to the pharmacy.   If patient schedules an appointment (virtual is fine) we can provide a sena refill.

## 2021-03-01 ENCOUNTER — TELEPHONE (OUTPATIENT)
Dept: FAMILY MEDICINE | Facility: CLINIC | Age: 76
End: 2021-03-01

## 2021-03-01 DIAGNOSIS — E11.42 DIABETIC POLYNEUROPATHY ASSOCIATED WITH TYPE 2 DIABETES MELLITUS (H): ICD-10-CM

## 2021-03-01 RX ORDER — HYDROCODONE BITARTRATE AND ACETAMINOPHEN 5; 325 MG/1; MG/1
1 TABLET ORAL 3 TIMES DAILY PRN
Qty: 50 TABLET | Refills: 0 | Status: SHIPPED | OUTPATIENT
Start: 2021-03-01 | End: 2021-03-19

## 2021-03-01 NOTE — TELEPHONE ENCOUNTER
Patient calling about refill request-Norco. I informed her that it was denied, needs an appointment. She wants to come into the clinic and not do a video appointment. Appointment made for 3/12/21. She wants to know if this can be refilled until the 3/12/21 appointment.Shakira Valle

## 2021-03-04 ENCOUNTER — PATIENT OUTREACH (OUTPATIENT)
Dept: GERIATRIC MEDICINE | Facility: CLINIC | Age: 76
End: 2021-03-04

## 2021-03-04 NOTE — PROGRESS NOTES
[facility] Care Coordination Contact    Left a VM for member/family to contact CC to schedule annual HRA.     Regine Stapleton RN, PHN  [facility]  [phone]

## 2021-03-04 NOTE — PROGRESS NOTES
[facility] Care Coordination Contact    Called daughterRichmond to schedule annual HRA home visit. HRA has been scheduled for 3/9/21 at 11:30am.     Regine Stapleton RN, PHN  [facility]  [phone]

## 2021-03-07 DIAGNOSIS — E78.5 HYPERLIPIDEMIA LDL GOAL <70: ICD-10-CM

## 2021-03-08 DIAGNOSIS — I10 ESSENTIAL HYPERTENSION WITH GOAL BLOOD PRESSURE LESS THAN 140/90: ICD-10-CM

## 2021-03-08 NOTE — TELEPHONE ENCOUNTER
Next 5 appointments (look out 90 days)    Mar 12, 2021  8:00 AM  SHORT with Lana Peguero MD  [facility] ([facility] [address] [facility] [address]  [phone]        Routing refill request to provider for review/approval because:  Labs not current:  Keaton Jean Baptiste BSN, RN

## 2021-03-08 NOTE — TELEPHONE ENCOUNTER
Routing refill request to provider for review/approval because:  Labs not current:  Lipids    Next 5 appointments (look out 90 days)    Mar 12, 2021  8:00 AM  SHORT with Lana Peguero MD  [facility] ([facility] ) [address] [place] [id number]  [phone]        Marina Young BSN, RN

## 2021-03-09 ENCOUNTER — PATIENT OUTREACH (OUTPATIENT)
Dept: GERIATRIC MEDICINE | Facility: CLINIC | Age: 76
End: 2021-03-09

## 2021-03-09 RX ORDER — AMLODIPINE BESYLATE 10 MG/1
TABLET ORAL
Qty: 90 TABLET | Refills: 1 | Status: SHIPPED | OUTPATIENT
Start: 2021-03-09 | End: 2021-09-02

## 2021-03-09 RX ORDER — SIMVASTATIN 20 MG
TABLET ORAL
Qty: 90 TABLET | Refills: 1 | Status: SHIPPED | OUTPATIENT
Start: 2021-03-09 | End: 2021-09-02

## 2021-03-11 NOTE — PROGRESS NOTES
[facility] Care Coordination Contact    [facility] Home Visit Assessment     Home visit for Health Risk Assessment with Enrique Pruitt completed on March 11, 2021    Type of residence:: Private home - stairs  Current living arrangement:: I live in a private home with family     Assessment completed with:: Patient, Care Team Member, Family, Other( ([place] ID#[id number]))    Current Care Plan  Member currently receiving the following home care services:     Member currently receiving the following community resources:        Medication Review  Medication reconciliation completed in Epic: Yes  Medication set-up & administration: Family/informal caregiver sets up daily.  Self-administers medications.  Medication Risk Assessment Medication (1 or more, place referral to MTM): N/A: No risk factors identified  MTM Referral Placed: No: No risk factors idenified    Mental/Behavioral Health   Depression Screening:   PHQ-2 Total Score (Adult) - Positive if 3 or more points; Administer PHQ-9 if positive: 0       Mental health DX:: No        Falls Assessment:   Fallen 2 or more times in the past year?: No   Any fall with injury in the past year?: No    ADL/IADL Dependencies:   Dependent ADLs:: Ambulation-walker, Bathing, Dressing, Eating, Grooming, Incontinence, Transfers, Wheelchair-with assist, Toileting  Dependent IADLs:: Cleaning, Cooking, Laundry, Shopping, Meal Preparation, Medication Management, Money Management, Transportation, Incontinence    [facility] Health Plan sponsored benefits: Shared information re: Silver Sneakers/gym memberships, ASA, Calcium +D.    PCA Assessment completed at visit: Yes Annual PCA assessment indicated 25 units per day of PCA. This is the same as the previous assessment.     Elderly Waiver Eligibility: Yes-will continue on EW    Care Plan & Recommendations: Enrique is 75 years old and lives with her daughter. Enrique has a health hx of Diabetes, neuropathy, HTN, Chronic  Pain syndrome, weakness in JAXON LE, CKD III and chronic back pain. Enrique states her health is stable, although she feels weaker this year. Enrique manages her chronic pain with medication, ROM, Creams and massage. Family assists with IADLS/ADLS daily in addition to PCA and homemaker. Enrique ambulates with a walker at home and uses a manual W/C for appointments/outings. Enrique is dependent with most IADLS/ADLS. Enrique reports no falls, ED or hospitalization within the past year. Enrique states current support and services are meeting needs. Enrique will resume PCA, homemaker and DME services.     See [facility] for detailed assessment information.    Follow-Up Plan: Member informed of future contact, plan to f/u with member with a 6 month telephone assessment.  Contact information shared with member and family, encouraged member to call with any questions or concerns at any time.    [facility] care continuum providers: Please refer to Health Care Home on the Epic Problem List to view this patient's [facility] Care Plan Summary.    Regine Stapleton RN, PHN  [facility]  [phone]

## 2021-03-16 ENCOUNTER — PATIENT OUTREACH (OUTPATIENT)
Dept: GERIATRIC MEDICINE | Facility: CLINIC | Age: 76
End: 2021-03-16

## 2021-03-16 ENCOUNTER — IMMUNIZATION (OUTPATIENT)
Dept: PEDIATRICS | Facility: CLINIC | Age: 76
End: 2021-03-16
Attending: INTERNAL MEDICINE
Payer: COMMERCIAL

## 2021-03-16 PROCEDURE — 91300 PR COVID VAC PFIZER DIL RECON 30 MCG/0.3 ML IM: CPT

## 2021-03-16 PROCEDURE — 0002A PR COVID VAC PFIZER DIL RECON 30 MCG/0.3 ML IM: CPT

## 2021-03-16 NOTE — LETTER
March 26, 2021      ENRIQUE NEWBERRY  [address]  [place] [address]      Dear Enrique:    At [facility], we are dedicated to improving your health and well-being. Enclosed is the Comprehensive Care Plan that we developed with you on 03/09/2021. Please review the Care Plan carefully.    As a reminder, some of the things we discussed at your visit include:    Your physical and mental health    Ways to reduce falls    Health care needs you may have    Don t forget to contact your care coordinator if you:    Have been hospitalized or plan to be hospitalized     Have had a fall     Have experienced a change in physical health    Are experiencing emotional problems     If you do not agree with your Care Plan, have questions about it, or have experienced a change in your needs, please call me at [phone]. If you are hearing impaired, please call the [place] Relay at 232 or [phone] (higqox-ti-mulzgc relay service).    Sincerely,    Regine Stapleton RN, PHN    E-mail:[email or web address]  Phone: [phone]    Care Manager  [facility] is a health plan that contracts with both Medicare and the [place] Medical Assistance (Medicaid) program to provide benefits of both programs to enrollees. Enrollment in [facility] depends on contract renewal.    [id number])     [id number] (11/18)

## 2021-03-16 NOTE — PROGRESS NOTES
[facility] Care Coordination Contact    [facility]:  Emailed completed PCA assessment to [facility].  Faxed copy of PCA assessment to PCA Agency and mailed copy to member.  Faxed MD Communication to PCP.     Signature pages sent by CC and received and filed.    Brittney Lee  Care Management Specialist  [facility]  [phone]

## 2021-03-16 NOTE — LETTER
[facility]  [address], [address]  [place] [address]  Phone:  [phone]  Fax:  [phone]        March 16, 2021    ENRIQUE NEWBERRY  [address] [address]    Dear Enrique,    Evan is a copy of your completed PCA Assessment and Service Plan.  This is for your records and no action is required by you.  If you have additional questions regarding your assessment please contact me at [phone]. If you feel that your needs are not being met, please contact the Clinical Supervisor at [phone].    Sincerely,    Regine Stapleton RN, PHN    E-mail:[email or web address]  Phone: [phone]    Care Manager  [facility]            Enclosure:  Completed PCA assessment

## 2021-03-19 ENCOUNTER — OFFICE VISIT (OUTPATIENT)
Dept: FAMILY MEDICINE | Facility: CLINIC | Age: 76
End: 2021-03-19
Payer: COMMERCIAL

## 2021-03-19 VITALS
BODY MASS INDEX: 32.68 KG/M2 | TEMPERATURE: 98.2 F | DIASTOLIC BLOOD PRESSURE: 69 MMHG | WEIGHT: 151 LBS | SYSTOLIC BLOOD PRESSURE: 121 MMHG | OXYGEN SATURATION: 100 % | HEART RATE: 109 BPM

## 2021-03-19 DIAGNOSIS — E11.42 DIABETIC POLYNEUROPATHY ASSOCIATED WITH TYPE 2 DIABETES MELLITUS (H): ICD-10-CM

## 2021-03-19 DIAGNOSIS — E11.42 TYPE 2 DIABETES MELLITUS WITH DIABETIC POLYNEUROPATHY, WITHOUT LONG-TERM CURRENT USE OF INSULIN (H): Primary | ICD-10-CM

## 2021-03-19 DIAGNOSIS — J45.20 MILD INTERMITTENT ASTHMA WITHOUT COMPLICATION: ICD-10-CM

## 2021-03-19 DIAGNOSIS — G89.4 CHRONIC PAIN SYNDROME: ICD-10-CM

## 2021-03-19 DIAGNOSIS — E78.5 HYPERLIPIDEMIA LDL GOAL <70: ICD-10-CM

## 2021-03-19 DIAGNOSIS — E11.42 TYPE 2 DIABETES MELLITUS WITH DIABETIC POLYNEUROPATHY, WITHOUT LONG-TERM CURRENT USE OF INSULIN (H): ICD-10-CM

## 2021-03-19 DIAGNOSIS — I10 ESSENTIAL HYPERTENSION WITH GOAL BLOOD PRESSURE LESS THAN 140/90: ICD-10-CM

## 2021-03-19 DIAGNOSIS — L30.9 DERMATITIS: ICD-10-CM

## 2021-03-19 DIAGNOSIS — N18.31 STAGE 3A CHRONIC KIDNEY DISEASE (H): ICD-10-CM

## 2021-03-19 PROBLEM — J45.909 ASTHMA: Status: ACTIVE | Noted: 2021-03-19

## 2021-03-19 LAB
ALBUMIN SERPL-MCNC: 3.6 G/DL (ref 3.4–5)
ALP SERPL-CCNC: 92 U/L (ref 40–150)
ALT SERPL W P-5'-P-CCNC: 15 U/L (ref 0–50)
ANION GAP SERPL CALCULATED.3IONS-SCNC: 5 MMOL/L (ref 3–14)
AST SERPL W P-5'-P-CCNC: 10 U/L (ref 0–45)
BILIRUB SERPL-MCNC: 0.4 MG/DL (ref 0.2–1.3)
BUN SERPL-MCNC: 14 MG/DL (ref 7–30)
CALCIUM SERPL-MCNC: 8.6 MG/DL (ref 8.5–10.1)
CHLORIDE SERPL-SCNC: 106 MMOL/L (ref 94–109)
CHOLEST SERPL-MCNC: 139 MG/DL
CO2 SERPL-SCNC: 28 MMOL/L (ref 20–32)
CREAT SERPL-MCNC: 0.74 MG/DL (ref 0.52–1.04)
CREAT UR-MCNC: 119 MG/DL
GFR SERPL CREATININE-BSD FRML MDRD: 78 ML/MIN/{1.73_M2}
GLUCOSE SERPL-MCNC: 180 MG/DL (ref 70–99)
HBA1C MFR BLD: 7.1 % (ref 0–5.6)
HDLC SERPL-MCNC: 76 MG/DL
LDLC SERPL CALC-MCNC: 41 MG/DL
MICROALBUMIN UR-MCNC: 140 MG/L
MICROALBUMIN/CREAT UR: 117.65 MG/G CR (ref 0–25)
NONHDLC SERPL-MCNC: 63 MG/DL
POTASSIUM SERPL-SCNC: 3.8 MMOL/L (ref 3.4–5.3)
PROT SERPL-MCNC: 6.9 G/DL (ref 6.8–8.8)
SODIUM SERPL-SCNC: 139 MMOL/L (ref 133–144)
TRIGL SERPL-MCNC: 108 MG/DL

## 2021-03-19 PROCEDURE — 83036 HEMOGLOBIN GLYCOSYLATED A1C: CPT | Performed by: FAMILY MEDICINE

## 2021-03-19 PROCEDURE — 80053 COMPREHEN METABOLIC PANEL: CPT | Performed by: FAMILY MEDICINE

## 2021-03-19 PROCEDURE — 99214 OFFICE O/P EST MOD 30 MIN: CPT | Performed by: FAMILY MEDICINE

## 2021-03-19 PROCEDURE — 82043 UR ALBUMIN QUANTITATIVE: CPT | Performed by: FAMILY MEDICINE

## 2021-03-19 PROCEDURE — 80061 LIPID PANEL: CPT | Performed by: FAMILY MEDICINE

## 2021-03-19 PROCEDURE — 36415 COLL VENOUS BLD VENIPUNCTURE: CPT | Performed by: FAMILY MEDICINE

## 2021-03-19 RX ORDER — HYDROCODONE BITARTRATE AND ACETAMINOPHEN 5; 325 MG/1; MG/1
1 TABLET ORAL 3 TIMES DAILY PRN
Qty: 50 TABLET | Refills: 0 | Status: SHIPPED | OUTPATIENT
Start: 2021-03-19 | End: 2021-04-14

## 2021-03-19 RX ORDER — TRIAMCINOLONE ACETONIDE 1 MG/G
CREAM TOPICAL
Qty: 60 G | Refills: 2 | Status: SHIPPED | OUTPATIENT
Start: 2021-03-19 | End: 2021-09-13

## 2021-03-19 RX ORDER — GLIMEPIRIDE 4 MG/1
TABLET ORAL
Qty: 180 TABLET | Refills: 1 | Status: SHIPPED | OUTPATIENT
Start: 2021-03-19 | End: 2021-09-13

## 2021-03-19 NOTE — PROGRESS NOTES
"    Assessment & Plan     Type 2 diabetes mellitus with diabetic polyneuropathy, without long-term current use of insulin (H)  Has been well controlled on current regimen though control varies.  So recheck and adjust as indicated.  Compliance might be an issue on some of this.  - glimepiride (AMARYL) 4 MG tablet; TAKE 1 TABLET BY MOUTH TWICE DAILY  - metFORMIN (GLUCOPHAGE) 500 MG tablet; TAKE 2 TABLETS BY MOUTH TWICE DAILY WITH MEALS  - sitagliptin (JANUVIA) 50 MG tablet; Take 1 tablet (50 mg) by mouth daily    Stage 3a chronic kidney disease  Stable and continuing to follow    Diabetic polyneuropathy associated with type 2 diabetes mellitus  Stable and continuing to follow  - HYDROcodone-acetaminophen (NORCO) 5-325 MG tablet; Take 1 tablet by mouth 3 times daily as needed for severe pain    Essential hypertension with goal blood pressure less than 140/90  Stable on current regimen.  Continue same plan and routine follow-up.     Hyperlipidemia LDL goal <70  Stable on current regimen.  Continue same plan and routine follow-up.     Mild intermittent asthma without complication  Stable on current regimen.  Continue same plan and routine follow-up.     Dermatitis  Stable on current regimen.  Continue same plan and routine follow-up.   - triamcinolone (KENALOG) 0.1 % external cream; APPLY EXTERNALLY TO THE AFFECTED AREA TWICE DAILY AS NEEDED FOR IRRITATION    Chronic pain syndrome  Up-to-date on controlled substance agreement, urine drug screen,  database monitoring.         BMI:   Estimated body mass index is 32.68 kg/m  as calculated from the following:    Height as of 10/6/20: 1.448 m (4' 9\").    Weight as of this encounter: 68.5 kg (151 lb).       See Patient Instructions    Return in about 3 months (around 6/19/2021) for Follow up on Pain, In Office or Video, can call for refills.    Lana Peguero MD  [facility] [place]    Subjective   Somkhith is a 76 year old who presents for the " following health issues  accompanied by her daughter:    HPI     Diabetes Follow-up    How often are you checking your blood sugar? Two times daily  Blood sugar testing frequency justification:    What time of day are you checking your blood sugars (select all that apply)?  Before meals  Have you had any blood sugars above 200?  No  Have you had any blood sugars below 70?  Yes     What symptoms do you notice when your blood sugar is low?  None    What concerns do you have today about your diabetes? None     Do you have any of these symptoms? (Select all that apply)  Numbness in feet              Hyperlipidemia Follow-Up      Are you regularly taking any medication or supplement to lower your cholesterol?   Yes- 1    Are you having muscle aches or other side effects that you think could be caused by your cholesterol lowering medication?  No    Hypertension Follow-up      Do you check your blood pressure regularly outside of the clinic? No     Are you following a low salt diet? Yes    Are your blood pressures ever more than 140 on the top number (systolic) OR more   than 90 on the bottom number (diastolic), for example 140/90? unsure    BP Readings from Last 2 Encounters:   10/06/20 137/82   03/18/20 129/67     Hemoglobin A1C (%)   Date Value   10/06/2020 7.1 (H)   02/11/2020 9.6 (H)     LDL Cholesterol Calculated (mg/dL)   Date Value   02/11/2020 44   09/23/2019 106 (H)         How many servings of fruits and vegetables do you eat daily?  2-3    On average, how many sweetened beverages do you drink each day (Examples: soda, juice, sweet tea, etc.  Do NOT count diet or artificially sweetened beverages)?   1    How many days per week do you exercise enough to make your heart beat faster? 3 or less    How many minutes a day do you exercise enough to make your heart beat faster? 10 - 19    How many days per week do you miss taking your medication? 0    Pt also needs refill of pain medications Hydrocodone and cream. Would  like to also get an order for a BP machine for at home.     Here today in follow-up of diabetes, hypertension, lipids, chronic kidney disease.  Still continues to deal with chronic foot and leg pain related to neuropathy.  Up-to-date on routine monitoring.  Had both Covid vaccinations.    Review of Systems   Constitutional, HEENT, cardiovascular, pulmonary, gi and gu systems are negative, except as otherwise noted.      Objective    LMP  (LMP Unknown)   There is no height or weight on file to calculate BMI.  Physical Exam   Alert, pleasant, upbeat, and in no apparent discomfort.  S1 and S2 normal, no murmurs, clicks, gallops or rubs. Regular rate and rhythm. Chest is clear; no wheezes or rales. No edema or JVD.  Past labs reviewed with the patient.

## 2021-03-19 NOTE — LETTER
March 23, 2021      Enrique Pruitt  [address]  [place] [address]        Dear ,    Your lab work looks just fine.  Keep up the good work.       TIMMY Peguero MD     Resulted Orders   Hemoglobin A1c   Result Value Ref Range    Hemoglobin A1C 7.1 (H) 0 - 5.6 %      Comment:      Normal <5.7% Prediabetes 5.7-6.4%  Diabetes 6.5% or higher - adopted from ADA   consensus guidelines.     Albumin Random Urine Quantitative with Creat Ratio   Result Value Ref Range    Creatinine Urine 119 mg/dL    Albumin Urine mg/L 140 mg/L    Albumin Urine mg/g Cr 117.65 (H) 0 - 25 mg/g Cr   Comprehensive metabolic panel   Result Value Ref Range    Sodium 139 133 - 144 mmol/L    Potassium 3.8 3.4 - 5.3 mmol/L    Chloride 106 94 - 109 mmol/L    Carbon Dioxide 28 20 - 32 mmol/L    Anion Gap 5 3 - 14 mmol/L    Glucose 180 (H) 70 - 99 mg/dL      Comment:      Non Fasting    Urea Nitrogen 14 7 - 30 mg/dL    Creatinine 0.74 0.52 - 1.04 mg/dL    GFR Estimate 78 >60 mL/min/[1.73_m2]      Comment:      Non  GFR Calc  Starting 12/18/2018, serum creatinine based estimated GFR (eGFR) will be   calculated using the Chronic Kidney Disease Epidemiology Collaboration   (CKD-EPI) equation.      GFR Estimate If Black >90 >60 mL/min/[1.73_m2]      Comment:       GFR Calc  Starting 12/18/2018, serum creatinine based estimated GFR (eGFR) will be   calculated using the Chronic Kidney Disease Epidemiology Collaboration   (CKD-EPI) equation.      Calcium 8.6 8.5 - 10.1 mg/dL    Bilirubin Total 0.4 0.2 - 1.3 mg/dL    Albumin 3.6 3.4 - 5.0 g/dL    Protein Total 6.9 6.8 - 8.8 g/dL    Alkaline Phosphatase 92 40 - 150 U/L    ALT 15 0 - 50 U/L    AST 10 0 - 45 U/L   Lipid panel reflex to direct LDL Non-fasting   Result Value Ref Range    Cholesterol 139 <200 mg/dL    Triglycerides 108 <150 mg/dL      Comment:      Non Fasting    HDL Cholesterol 76 >49 mg/dL    LDL Cholesterol Calculated 41 <100 mg/dL       Comment:      Desirable:       <100 mg/dl    Non HDL Cholesterol 63 <130 mg/dL

## 2021-03-22 RX ORDER — GLIMEPIRIDE 4 MG/1
TABLET ORAL
Qty: 180 TABLET | Refills: 0 | OUTPATIENT
Start: 2021-03-22

## 2021-03-22 RX ORDER — PIOGLITAZONEHYDROCHLORIDE 45 MG/1
TABLET ORAL
Qty: 90 TABLET | Refills: 1 | Status: SHIPPED | OUTPATIENT
Start: 2021-03-22 | End: 2021-09-20

## 2021-03-22 NOTE — TELEPHONE ENCOUNTER
Routing refill request to provider for review/approval because:  Labs not current    Bria Hernandez RN, BSN, CMSRN  [facility]

## 2021-03-26 NOTE — PROGRESS NOTES
[facility] Care Coordination Contact    Received after visit chart from care coordinator.  Completed following tasks: Mailed copy of care plan to client, Updated services in access and Submitted referrals/auths for HMK & wipes.     Provider Signature - No POC Shared:  Member indicates that they do not want their POC shared with any EW providers.    Brittney Lee  Care Management Specialist  [facility]  [phone]

## 2021-04-12 ENCOUNTER — TELEPHONE (OUTPATIENT)
Dept: FAMILY MEDICINE | Facility: CLINIC | Age: 76
End: 2021-04-12

## 2021-04-13 DIAGNOSIS — E11.42 TYPE 2 DIABETES MELLITUS WITH DIABETIC POLYNEUROPATHY (H): ICD-10-CM

## 2021-04-13 RX ORDER — BLOOD SUGAR DIAGNOSTIC
1 STRIP MISCELLANEOUS 3 TIMES DAILY
Qty: 300 STRIP | Refills: 1 | Status: SHIPPED | OUTPATIENT
Start: 2021-04-13 | End: 2021-10-04

## 2021-04-14 ENCOUNTER — TELEPHONE (OUTPATIENT)
Dept: FAMILY MEDICINE | Facility: CLINIC | Age: 76
End: 2021-04-14

## 2021-04-14 DIAGNOSIS — E11.42 DIABETIC POLYNEUROPATHY ASSOCIATED WITH TYPE 2 DIABETES MELLITUS (H): ICD-10-CM

## 2021-04-14 RX ORDER — HYDROCODONE BITARTRATE AND ACETAMINOPHEN 5; 325 MG/1; MG/1
1 TABLET ORAL 3 TIMES DAILY PRN
Qty: 50 TABLET | Refills: 0 | Status: SHIPPED | OUTPATIENT
Start: 2021-04-14 | End: 2021-05-12

## 2021-04-14 NOTE — TELEPHONE ENCOUNTER
Routing refill request to provider for review/approval because:  Drug not on the [facility] refill protocol     Adrian Olivier RN  [facility]

## 2021-05-11 ENCOUNTER — TELEPHONE (OUTPATIENT)
Dept: FAMILY MEDICINE | Facility: CLINIC | Age: 76
End: 2021-05-11

## 2021-05-11 DIAGNOSIS — E11.42 DIABETIC POLYNEUROPATHY ASSOCIATED WITH TYPE 2 DIABETES MELLITUS (H): ICD-10-CM

## 2021-05-11 NOTE — TELEPHONE ENCOUNTER
Reason for Call:  Med refill    Detailed comments: Hydrocodone sent to [facility] - [address], [place] [address]    Phone Number Patient can be reached at: [phone]    Best Time: Anytime    Can we leave a detailed message on this number? YES    Call taken on 5/11/2021 at 3:55 PM by Jesus Workman

## 2021-05-12 RX ORDER — HYDROCODONE BITARTRATE AND ACETAMINOPHEN 5; 325 MG/1; MG/1
1 TABLET ORAL 3 TIMES DAILY PRN
Qty: 50 TABLET | Refills: 0 | Status: SHIPPED | OUTPATIENT
Start: 2021-05-12 | End: 2021-06-17

## 2021-06-17 DIAGNOSIS — E11.42 DIABETIC POLYNEUROPATHY ASSOCIATED WITH TYPE 2 DIABETES MELLITUS (H): ICD-10-CM

## 2021-06-17 RX ORDER — HYDROCODONE BITARTRATE AND ACETAMINOPHEN 5; 325 MG/1; MG/1
1 TABLET ORAL 3 TIMES DAILY PRN
Qty: 50 TABLET | Refills: 0 | Status: SHIPPED | OUTPATIENT
Start: 2021-06-17 | End: 2021-07-20

## 2021-06-29 NOTE — TELEPHONE ENCOUNTER
HYDROcodone-acetaminophen (NORCO) 5-325 MG per tablet      Last Written Prescription Date:  12/30/16  Last Fill Quantity: 120,   # refills: 0  Last Office Visit with [facility] or [facility] prescribing provider: 11/11/16  Future Office visit:       Routing refill request to provider for review/approval because:  Drug not on the [facility], [facility] or [facility] refill protocol or controlled substance       X Size Of Lesion In Cm (Optional): 0

## 2021-08-19 DIAGNOSIS — E11.42 DIABETIC POLYNEUROPATHY ASSOCIATED WITH TYPE 2 DIABETES MELLITUS (H): ICD-10-CM

## 2021-08-19 NOTE — TELEPHONE ENCOUNTER
Reason for Call:  Medication or medication refill:    Do you use a [facility]?  Name of the pharmacy and phone number for the current request:  [id number] - [facility] [address] [place] [address] AT [facility]    Name of the medication requested: Hydrocodone     Other request: refill request    Can we leave a detailed message on this number? YES    Phone number patient can be reached at: Home number on file [phone] (home)    Best Time: any    Call taken on 8/19/2021 at 4:33 PM by Lacie Lemus

## 2021-08-20 RX ORDER — HYDROCODONE BITARTRATE AND ACETAMINOPHEN 5; 325 MG/1; MG/1
1 TABLET ORAL 3 TIMES DAILY PRN
Qty: 50 TABLET | Refills: 0 | Status: SHIPPED | OUTPATIENT
Start: 2021-08-20 | End: 2021-09-21

## 2021-08-20 NOTE — TELEPHONE ENCOUNTER
Routing refill request to provider for review/approval because:  Drug not on the [facility] refill protocol     Bria Hernandez RN, BSN, CMSRN  [facility]

## 2021-08-21 DIAGNOSIS — K21.9 GASTROESOPHAGEAL REFLUX DISEASE WITHOUT ESOPHAGITIS: ICD-10-CM

## 2021-09-02 DIAGNOSIS — E78.5 HYPERLIPIDEMIA LDL GOAL <70: ICD-10-CM

## 2021-09-02 DIAGNOSIS — I10 ESSENTIAL HYPERTENSION WITH GOAL BLOOD PRESSURE LESS THAN 140/90: ICD-10-CM

## 2021-09-02 RX ORDER — AMLODIPINE BESYLATE 10 MG/1
TABLET ORAL
Qty: 90 TABLET | Refills: 1 | Status: SHIPPED | OUTPATIENT
Start: 2021-09-02 | End: 2022-03-04

## 2021-09-02 RX ORDER — SIMVASTATIN 20 MG
TABLET ORAL
Qty: 90 TABLET | Refills: 1 | Status: SHIPPED | OUTPATIENT
Start: 2021-09-02 | End: 2022-03-04

## 2021-09-19 DIAGNOSIS — E11.42 TYPE 2 DIABETES MELLITUS WITH DIABETIC POLYNEUROPATHY, WITHOUT LONG-TERM CURRENT USE OF INSULIN (H): ICD-10-CM

## 2021-09-19 RX ORDER — PIOGLITAZONEHYDROCHLORIDE 45 MG/1
TABLET ORAL
Qty: 90 TABLET | Refills: 1 | Status: CANCELLED | OUTPATIENT
Start: 2021-09-19

## 2021-09-20 DIAGNOSIS — E11.42 DIABETIC POLYNEUROPATHY ASSOCIATED WITH TYPE 2 DIABETES MELLITUS (H): ICD-10-CM

## 2021-09-20 DIAGNOSIS — E11.42 TYPE 2 DIABETES MELLITUS WITH DIABETIC POLYNEUROPATHY, WITHOUT LONG-TERM CURRENT USE OF INSULIN (H): ICD-10-CM

## 2021-09-20 RX ORDER — PIOGLITAZONEHYDROCHLORIDE 45 MG/1
45 TABLET ORAL DAILY
Qty: 30 TABLET | Refills: 0 | Status: SHIPPED | OUTPATIENT
Start: 2021-09-20 | End: 2021-10-26

## 2021-09-20 NOTE — TELEPHONE ENCOUNTER
Routing refill request to provider for review/approval because:  Drug not on the [facility] refill protocol   Morena Jean Baptiste BSN, RN

## 2021-09-20 NOTE — TELEPHONE ENCOUNTER
"Requested Prescriptions   Pending Prescriptions Disp Refills     pioglitazone (ACTOS) 45 MG tablet [Pharmacy Med Name: PIOGLITAZONE 45MG TABLETS] 90 tablet 1     Sig: TAKE 1 TABLET(45 MG) BY MOUTH DAILY       Thiazolidinedione Agents (TZDs)  Failed - 9/19/2021 11:23 AM        Failed - Patient has documented A1c within the specified period of time.     If HgbA1C is 8 or greater, it needs to be on file within the past 3 months.  If less than 8, must be on file within the past 6 months.     Recent Labs   Lab Test 03/19/21  1415   A1C 7.1*             Failed - Recent (6 mo) or future (30 days) visit within the authorizing provider's specialty     Patient had office visit in the last 6 months or has a visit in the next 30 days with authorizing provider or within the authorizing provider's specialty.  See \"Patient Info\" tab in inbasket, or \"Choose Columns\" in Meds & Orders section of the refill encounter.            Passed - Patient has a normal ALT within the past 12 mos.     Recent Labs   Lab Test 03/19/21  1415   ALT 15             Passed - Patient has a normal AST within the past 12 mos.      Recent Labs   Lab Test 03/19/21  1415   AST 10             Passed - Diagnosis not CHF        Passed - Medication is active on med list        Passed - Patient is age 18 or older        Passed - Patient is not pregnant        Passed - Patient has a normal serum Creatinine in the past 12 months     Recent Labs   Lab Test 03/19/21  1415   CR 0.74       Ok to refill medication if creatinine is low          Passed - Patient has not had a positive pregnancy test within the past 12 mos.             Marina AMAYAN, RN    "

## 2021-09-21 RX ORDER — PIOGLITAZONEHYDROCHLORIDE 45 MG/1
TABLET ORAL
Qty: 90 TABLET | OUTPATIENT
Start: 2021-09-21

## 2021-09-21 RX ORDER — HYDROCODONE BITARTRATE AND ACETAMINOPHEN 5; 325 MG/1; MG/1
1 TABLET ORAL 3 TIMES DAILY PRN
Qty: 50 TABLET | Refills: 0 | Status: SHIPPED | OUTPATIENT
Start: 2021-09-21 | End: 2021-10-26

## 2021-09-21 NOTE — TELEPHONE ENCOUNTER
Routing refill request to provider for review/approval because:  Labs not current:    Lab Results   Component Value Date    A1C 7.1 03/19/2021    A1C 7.1 10/06/2020    A1C 9.6 02/11/2020    A1C 7.1 09/23/2019    A1C 9.4 05/17/2019       Patient needs to be seen because:  Diabetes.  Janet Iyer RN

## 2021-10-02 DIAGNOSIS — E11.42 TYPE 2 DIABETES MELLITUS WITH DIABETIC POLYNEUROPATHY, WITHOUT LONG-TERM CURRENT USE OF INSULIN (H): Primary | ICD-10-CM

## 2021-10-02 DIAGNOSIS — E11.42 TYPE 2 DIABETES MELLITUS WITH DIABETIC POLYNEUROPATHY (H): ICD-10-CM

## 2021-10-04 RX ORDER — BLOOD SUGAR DIAGNOSTIC
1 STRIP MISCELLANEOUS DAILY
Qty: 100 STRIP | Refills: 3 | Status: SHIPPED | OUTPATIENT
Start: 2021-10-04 | End: 2021-11-30

## 2021-10-04 NOTE — TELEPHONE ENCOUNTER
Overdue for diabetic appointment.     No appointment pending at this time.  Routing to provider to advise.    Marina AMAYAN, RN

## 2021-10-06 ENCOUNTER — TELEPHONE (OUTPATIENT)
Dept: GERIATRIC MEDICINE | Facility: CLINIC | Age: 76
End: 2021-10-06

## 2021-10-17 DIAGNOSIS — E11.42 TYPE 2 DIABETES MELLITUS WITH DIABETIC POLYNEUROPATHY, WITHOUT LONG-TERM CURRENT USE OF INSULIN (H): ICD-10-CM

## 2021-10-19 RX ORDER — PIOGLITAZONEHYDROCHLORIDE 45 MG/1
TABLET ORAL
Qty: 90 TABLET | OUTPATIENT
Start: 2021-10-19

## 2021-10-19 NOTE — TELEPHONE ENCOUNTER
Refill denied to the pharmacy.   If patient schedules an appointment we can provide a sena refill.

## 2021-10-19 NOTE — TELEPHONE ENCOUNTER
Routing refill request to provider for review/approval because:  Labs not current:  A1C    Marina AMAYAN, RN

## 2021-10-26 ENCOUNTER — PATIENT OUTREACH (OUTPATIENT)
Dept: GERIATRIC MEDICINE | Facility: CLINIC | Age: 76
End: 2021-10-26

## 2021-10-26 DIAGNOSIS — E11.42 TYPE 2 DIABETES MELLITUS WITH DIABETIC POLYNEUROPATHY, WITHOUT LONG-TERM CURRENT USE OF INSULIN (H): ICD-10-CM

## 2021-10-26 NOTE — PROGRESS NOTES
[facility] Care Coordination Contact    Called adult daughter Jaleel to complete six month assessment and left a message requesting a return call.    Regine Stapleton RN, PHN  [facility]  [phone]

## 2021-10-27 ENCOUNTER — PATIENT OUTREACH (OUTPATIENT)
Dept: GERIATRIC MEDICINE | Facility: CLINIC | Age: 76
End: 2021-10-27

## 2021-10-27 RX ORDER — PIOGLITAZONEHYDROCHLORIDE 45 MG/1
TABLET ORAL
Qty: 90 TABLET | Refills: 0 | Status: SHIPPED | OUTPATIENT
Start: 2021-10-27 | End: 2022-02-07

## 2021-10-27 NOTE — PROGRESS NOTES
[facility] Care Coordination Contact      [facility] Six-Month Telephone Assessment    6 month telephone assessment completed on 10/27/21.    ER visits: No  Hospitalizations: No  TCU stays: No  Significant health status changes: NA  Falls/Injuries: No  ADL/IADL changes: No  Changes in services: No    Caregiver Assessment follow up:  NA    Goals: See POC in chart for goal progress documentation.  Spoke to member's daughter, Pilo and she states member is stable. Member continues to manage her chronic pain daily. Member is taking medication PRN to treat pain. Current support and services are meeting needs. POC reviewed and updated.     Will see member in 6 months for an annual health risk assessment.   Encouraged member to call CC with any questions or concerns in the meantime.     Regine Stapleton RN, PHN  [facility]  [phone]

## 2021-10-27 NOTE — TELEPHONE ENCOUNTER
"Routing refill request to provider for review/approval because:  Requested Prescriptions   Pending Prescriptions Disp Refills    pioglitazone (ACTOS) 45 MG tablet [Pharmacy Med Name: PIOGLITAZONE 45MG TABLETS] 90 tablet      Sig: TAKE 1 TABLET(45 MG) BY MOUTH DAILY. NEED FOLLOW UP VISIT FOR ANY FURTHER REFILL        Thiazolidinedione Agents (TZDs)  Failed - 10/26/2021  5:00 PM        Failed - Patient has documented A1c within the specified period of time.     If HgbA1C is 8 or greater, it needs to be on file within the past 3 months.  If less than 8, must be on file within the past 6 months.     Recent Labs   Lab Test 03/19/21  1415   A1C 7.1*             Failed - Recent (6 mo) or future (30 days) visit within the authorizing provider's specialty     Patient had office visit in the last 6 months or has a visit in the next 30 days with authorizing provider or within the authorizing provider's specialty.  See \"Patient Info\" tab in inbasket, or \"Choose Columns\" in Meds & Orders section of the refill encounter.            Passed - Patient has a normal ALT within the past 12 mos.     Recent Labs   Lab Test 03/19/21  1415   ALT 15             Passed - Patient has a normal AST within the past 12 mos.        Recent Labs   Lab Test 03/19/21  1415   AST 10             Passed - Diagnosis not CHF        Passed - Medication is active on med list        Passed - Patient is age 18 or older        Passed - Patient is not pregnant        Passed - Patient has a normal serum Creatinine in the past 12 months     Recent Labs   Lab Test 03/19/21  1415   CR 0.74       Ok to refill medication if creatinine is low          Passed - Patient has not had a positive pregnancy test within the past 12 mos.                Mireille AMAYAN, RN        "

## 2021-11-19 DIAGNOSIS — E11.42 TYPE 2 DIABETES MELLITUS WITH DIABETIC POLYNEUROPATHY, WITHOUT LONG-TERM CURRENT USE OF INSULIN (H): ICD-10-CM

## 2021-11-19 NOTE — TELEPHONE ENCOUNTER
Routing refill request to provider for review/approval because:  Labs not current:  A1C   Lab Test 03/19/21  1415   A1C 7.1*     Mary Martini RN  [facility]

## 2021-11-22 DIAGNOSIS — E11.42 DIABETIC POLYNEUROPATHY ASSOCIATED WITH TYPE 2 DIABETES MELLITUS (H): ICD-10-CM

## 2021-11-22 RX ORDER — HYDROCODONE BITARTRATE AND ACETAMINOPHEN 5; 325 MG/1; MG/1
1 TABLET ORAL 3 TIMES DAILY PRN
Qty: 50 TABLET | Refills: 0 | OUTPATIENT
Start: 2021-11-22

## 2021-11-22 NOTE — TELEPHONE ENCOUNTER
There is a consent to communicate with GARY VILLAGRAN (daughter) on file dated 8/1/2021 (td-11/22/2021)    Provider:  Are you willing to refill the requested HYDROcodone-acetaminophen (NORCO) 5-325 MG tablet?  Thank you. Luanne Mane R.N.    Patient is looking for a refill of her HYDROcodone-acetaminophen (NORCO) 5-325 MG tablet.  She will check with the pharmacy at a later time.     Next 5 appointments (look out 90 days)    Nov 30, 2021 11:20 AM  PHYSICAL with Lana Peguero MD  [facility] ([facility] ) [address] [place] [id number] [phone]

## 2021-11-22 NOTE — TELEPHONE ENCOUNTER
Denied - per [place] and [place] requirements patients need to be seen every 3 months when on pain medication.  She is long overdue and multiple attempts have been made to contact her about this.  She is also long overdue for diabetes visit and lab work.  So if she schedules an in person visit I can give a sena refill but not until then.

## 2021-11-24 NOTE — PATIENT INSTRUCTIONS
Patient Education   Personalized Prevention Plan  You are due for the preventive services outlined below.  Your care team is available to assist you in scheduling these services.  If you have already completed any of these items, please share that information with your care team to update in your medical record.  Health Maintenance Due   Topic Date Due     Osteoporosis Screening  Never done     ANNUAL REVIEW OF HM ORDERS  Never done     Asthma Action Plan - yearly  Never done     Asthma Control Test  Never done     Urine Test  Never done     Diptheria Tetanus Pertussis (DTAP/TDAP/TD) Vaccine (1 - Tdap) Never done     Zoster (Shingles) Vaccine (1 of 2) Never done     Annual Wellness Visit  Never done     Diabetic Foot Exam  12/19/2018     Hemoglobin  05/15/2019     Eye Exam  07/21/2021     Flu Vaccine (1) Never done     COVID-19 Vaccine (3 - Booster for Pfizer series) 09/16/2021     A1C Lab  09/19/2021     URINE DRUG SCREEN  10/06/2021

## 2021-11-24 NOTE — PROGRESS NOTES
"SUBJECTIVE:   Enrique Pruitt is a 76 year old female who presents for Preventive Visit.    {  Patient has been advised of split billing requirements and indicates understanding: Yes   Are you in the first 12 months of your Medicare coverage?  No    Healthy Habits:    In general, how would you rate your overall health?  Good    Frequency of exercise:  1 day/week    Duration of exercise:  Less than 15 minutes    Do you usually eat at least 4 servings of fruit and vegetables a day, include whole grains    & fiber and avoid regularly eating high fat or \"junk\" foods?  Yes    Taking medications regularly:  Yes    Barriers to taking medications:  None    Medication side effects:  None    Ability to successfully perform activities of daily living:  Medication administration requires assistance, money management requires assistance, laundry requires assistance, bathing requires assistance, housework requires assistance, transportation requires assistance, telephone requires assistance, shopping requires assistance and preparing meals requires assistance    Home Safety:  No safety concerns identified    Hearing Impairment:  Need to ask people to speak up or repeat themselves    In the past 6 months, have you been bothered by leaking of urine?  No    In general, how would you rate your overall mental or emotional health?  Fair      PHQ-2 Total Score:    Additional concerns today:  No    Do you feel safe in your environment? Yes    Have you ever done Advance Care Planning? (For example, a Health Directive, POLST, or a discussion with a medical provider or your loved ones about your wishes): Yes, patient states has an Advance Care Planning document and will bring a copy to the clinic.      Fall risk  Fallen 2 or more times in the past year?: No  Any fall with injury in the past year?: No  click delete button to remove this line now  Cognitive Screening   1) Repeat 3 items (Leader, Season, Table)    2) Clock draw: ABNORMAL "   3) 3 item recall: Recalls NO objects   Results: 0 items recalled: PROBABLE COGNITIVE IMPAIRMENT, **INFORM PROVIDER**    Mini-CogTM Copyright AN Marquez. Licensed by the author for use in [facility]; reprinted with permission (jaime@.[email or web address]). All rights reserved.      Do you have sleep apnea, excessive snoring or daytime drowsiness?: yes    Reviewed and updated as needed this visit by clinical staff  Tobacco  Allergies  Meds   Med Hx  Surg Hx  Fam Hx  Soc Hx       Reviewed and updated as needed this visit by Provider               Social History     Tobacco Use     Smoking status: Never Smoker     Smokeless tobacco: Never Used   Substance Use Topics     Alcohol use: No     If you drink alcohol do you typically have >3 drinks per day or >7 drinks per week? No    No flowsheet data found.      Current providers sharing in care for this patient include:   Patient Care Team:  Lana Peguero MD as PCP - General (Family Practice)  Regine Stapleton, RN as Lead Care Coordinator  Lex Martin OD as Assigned Surgical Provider  Lana Peguero MD as Assigned PCP    The following health maintenance items are reviewed in Epic and correct as of today:  Health Maintenance Due   Topic Date Due     DEXA  Never done     ASTHMA ACTION PLAN  Never done     ASTHMA CONTROL TEST  Never done     DTAP/TDAP/TD IMMUNIZATION (1 - Tdap) Never done     ZOSTER IMMUNIZATION (1 of 2) Never done     DIABETIC FOOT EXAM  12/19/2018     HEMOGLOBIN  05/15/2019     EYE EXAM  07/21/2021     INFLUENZA VACCINE (1) Never done     COVID-19 Vaccine (3 - Booster for Pfizer series) 09/16/2021     A1C  09/19/2021     URINE DRUG SCREEN  10/06/2021     Lab work is in process  Labs reviewed in EPIC  BP Readings from Last 3 Encounters:   11/30/21 131/75   03/19/21 121/69   10/06/20 137/82    Wt Readings from Last 3 Encounters:   11/30/21 64.4 kg (142 lb)   03/19/21 68.5 kg (151 lb)   10/06/20 73 kg (161 lb)            Here today with  "daughter who does the interpretation for routine checkup.  Also needs follow-up on multiple chronic conditions.  Additionally I had a long discussion with them about the ongoing use of pain medication and how we monitor this.  Patient is due for an update on controlled substance agreement which we talked about as well as urine drug screening.  I do not have any concerns about misuse or diversion but there are cautions given her age.  We discussed Covid booster and she plans to do so but would like to hold off for today.  Also discussed other vaccinations such as pneumonia shot and flu shot.    Review of Systems  CONSTITUTIONAL: NEGATIVE for fever, chills, change in weight  INTEGUMENTARY/SKIN: NEGATIVE for worrisome rashes, moles or lesions  EYES: NEGATIVE for vision changes or irritation  ENT/MOUTH: NEGATIVE for ear, mouth and throat problems  RESP: NEGATIVE for significant cough or SOB  BREAST: NEGATIVE for masses, tenderness or discharge  CV: NEGATIVE for chest pain, palpitations or peripheral edema  GI: NEGATIVE for nausea, abdominal pain, heartburn, or change in bowel habits  : NEGATIVE for frequency, dysuria, or hematuria  MUSCULOSKELETAL: NEGATIVE for significant arthralgias or myalgia  NEURO: NEGATIVE for weakness, dizziness or paresthesias  ENDOCRINE: NEGATIVE for temperature intolerance, skin/hair changes  HEME: NEGATIVE for bleeding problems  PSYCHIATRIC: NEGATIVE for changes in mood or affect    OBJECTIVE:   /75 (BP Location: Right arm, Patient Position: Sitting, Cuff Size: Adult Regular)   Pulse 112   Temp 98.9  F (37.2  C) (Oral)   Resp 18   Ht 1.448 m (4' 9\")   Wt 64.4 kg (142 lb)   LMP  (LMP Unknown)   SpO2 98%   BMI 30.73 kg/m   Estimated body mass index is 30.73 kg/m  as calculated from the following:    Height as of this encounter: 1.448 m (4' 9\").    Weight as of this encounter: 64.4 kg (142 lb).  Physical Exam  GENERAL APPEARANCE: healthy, alert and no distress  EYES: Eyes grossly " normal to inspection, PERRL and conjunctivae and sclerae normal  HENT: ear canals and TM's normal, nose and mouth without ulcers or lesions, oropharynx clear and oral mucous membranes moist  NECK: no adenopathy, no asymmetry, masses, or scars and thyroid normal to palpation  RESP: lungs clear to auscultation - no rales, rhonchi or wheezes  CV: regular rate and rhythm, normal S1 S2, no S3 or S4, no murmur, click or rub, no peripheral edema and peripheral pulses strong  ABDOMEN: soft, nontender, no hepatosplenomegaly, no masses and bowel sounds normal  MS: no musculoskeletal defects are noted and gait is age appropriate without ataxia  SKIN: no suspicious lesions or rashes  NEURO: Normal strength and tone, sensory exam grossly normal, mentation intact and speech normal  PSYCH: mentation appears normal and affect normal/bright    Diagnostic Test Results:  Labs reviewed in Epic    ASSESSMENT / PLAN:   (Z00.00) Encounter for Medicare annual wellness exam  (primary encounter diagnosis)  Comment: Discussed routine health maintenance with patient and her daughter.  Evidently has some cataracts but has been hesitant to want them repaired.  Plan:     (E11.42) Type 2 diabetes mellitus with diabetic polyneuropathy, without long-term current use of insulin (H)  Comment: Variable control.  Recheck and adjust as needed  Plan: Basic metabolic panel, Hemoglobin A1c,         Hemoglobin, glimepiride (AMARYL) 4 MG tablet,         sitagliptin (JANUVIA) 50 MG tablet,         OFFICE/OUTPT VISIT,EST,LEVL IV, blood glucose         (ONETOUCH ULTRA) test strip            (N18.31) Stage 3a chronic kidney disease (H)  Comment: Most recently kidney function was normal.  So we will recheck and assess whether this is still pertinent or not  Plan: OFFICE/OUTPT VISIT,EST,LEVL IV            (I10) Essential hypertension with goal blood pressure less than 140/90  Comment: Stable on current regimen.  Continue same plan and routine follow-up.   Plan:  "OFFICE/OUTPT VISIT,ESTMEAGHANL IV            (E78.5) Hyperlipidemia LDL goal <70  Comment: Recheck and adjust as needed  Plan: OFFICE/OUTPT VISIT,ESTLEVL IV            (E11.42) Diabetic polyneuropathy associated with type 2 diabetes mellitus  Comment:   Plan: HYDROcodone-acetaminophen (NORCO) 5-325 MG         tablet, pregabalin (LYRICA) 100 MG capsule,         OFFICE/OUTPT VISIT,ESTLEVL IV            (F11.90) Chronic, continuous use of opioids  Comment: Updated controlled substance agreement.   database reviewed.  Urine drug screen today.  Plan: OFFICE/OUTPT VISIT,ESTLEVL IV            (G89.4) Chronic pain syndrome  Comment:   Plan: Drug Abuse Screen Panel 13, Urine (Pain Care         Package) - lab collect, OFFICE/OUTPT         VISIT,ESTLEVL IV              Patient has been advised of split billing requirements and indicates understanding: Yes  COUNSELING:  Reviewed preventive health counseling, as reflected in patient instructions       Regular exercise       Healthy diet/nutrition       Vision screening       Dental care       Fall risk prevention    Estimated body mass index is 30.73 kg/m  as calculated from the following:    Height as of this encounter: 1.448 m (4' 9\").    Weight as of this encounter: 64.4 kg (142 lb).        She reports that she has never smoked. She has never used smokeless tobacco.      Appropriate preventive services were discussed with this patient, including applicable screening as appropriate for cardiovascular disease, diabetes, osteopenia/osteoporosis, and glaucoma.  As appropriate for age/gender, discussed screening for colorectal cancer, prostate cancer, breast cancer, and cervical cancer. Checklist reviewing preventive services available has been given to the patient.    Reviewed patients plan of care and provided an AVS. The Basic Care Plan (routine screening as documented in Health Maintenance) for Enrique meets the Care Plan requirement. This Care Plan has been established " and reviewed with the Patient.    Counseling Resources:  ATP IV Guidelines  Pooled Cohorts Equation Calculator  Breast Cancer Risk Calculator  Breast Cancer: Medication to Reduce Risk  FRAX Risk Assessment  ICSI Preventive Guidelines  Dietary Guidelines for Americans, 2010  USDA's MyPlate  ASA Prophylaxis  Lung CA Screening    Lana Peguero MD  [facility]    Identified Health Risks:

## 2021-11-30 ENCOUNTER — OFFICE VISIT (OUTPATIENT)
Dept: FAMILY MEDICINE | Facility: CLINIC | Age: 76
End: 2021-11-30
Payer: COMMERCIAL

## 2021-11-30 VITALS
BODY MASS INDEX: 30.63 KG/M2 | SYSTOLIC BLOOD PRESSURE: 131 MMHG | TEMPERATURE: 98.9 F | RESPIRATION RATE: 18 BRPM | HEART RATE: 112 BPM | OXYGEN SATURATION: 98 % | DIASTOLIC BLOOD PRESSURE: 75 MMHG | WEIGHT: 142 LBS | HEIGHT: 57 IN

## 2021-11-30 DIAGNOSIS — E11.42 DIABETIC POLYNEUROPATHY ASSOCIATED WITH TYPE 2 DIABETES MELLITUS (H): ICD-10-CM

## 2021-11-30 DIAGNOSIS — Z00.00 ENCOUNTER FOR MEDICARE ANNUAL WELLNESS EXAM: Primary | ICD-10-CM

## 2021-11-30 DIAGNOSIS — F11.90 CHRONIC, CONTINUOUS USE OF OPIOIDS: ICD-10-CM

## 2021-11-30 DIAGNOSIS — G89.4 CHRONIC PAIN SYNDROME: ICD-10-CM

## 2021-11-30 DIAGNOSIS — E78.5 HYPERLIPIDEMIA LDL GOAL <70: ICD-10-CM

## 2021-11-30 DIAGNOSIS — N18.31 STAGE 3A CHRONIC KIDNEY DISEASE (H): ICD-10-CM

## 2021-11-30 DIAGNOSIS — I10 ESSENTIAL HYPERTENSION WITH GOAL BLOOD PRESSURE LESS THAN 140/90: ICD-10-CM

## 2021-11-30 DIAGNOSIS — E11.42 TYPE 2 DIABETES MELLITUS WITH DIABETIC POLYNEUROPATHY, WITHOUT LONG-TERM CURRENT USE OF INSULIN (H): ICD-10-CM

## 2021-11-30 LAB
AMPHETAMINES UR QL: NOT DETECTED
ANION GAP SERPL CALCULATED.3IONS-SCNC: 5 MMOL/L (ref 3–14)
BARBITURATES UR QL SCN: NOT DETECTED
BENZODIAZ UR QL SCN: NOT DETECTED
BUN SERPL-MCNC: 16 MG/DL (ref 7–30)
BUPRENORPHINE UR QL: NOT DETECTED
CALCIUM SERPL-MCNC: 9.4 MG/DL (ref 8.5–10.1)
CANNABINOIDS UR QL: NOT DETECTED
CHLORIDE BLD-SCNC: 106 MMOL/L (ref 94–109)
CO2 SERPL-SCNC: 27 MMOL/L (ref 20–32)
COCAINE UR QL SCN: NOT DETECTED
CREAT SERPL-MCNC: 0.79 MG/DL (ref 0.52–1.04)
D-METHAMPHET UR QL: NOT DETECTED
GFR SERPL CREATININE-BSD FRML MDRD: 72 ML/MIN/1.73M2
GLUCOSE BLD-MCNC: 157 MG/DL (ref 70–99)
HBA1C MFR BLD: 8.2 % (ref 0–5.6)
HGB BLD-MCNC: 11.4 G/DL (ref 11.7–15.7)
METHADONE UR QL SCN: NOT DETECTED
OPIATES UR QL SCN: NOT DETECTED
OXYCODONE UR QL SCN: NOT DETECTED
PCP UR QL SCN: NOT DETECTED
POTASSIUM BLD-SCNC: 4.1 MMOL/L (ref 3.4–5.3)
PROPOXYPH UR QL: NOT DETECTED
SODIUM SERPL-SCNC: 138 MMOL/L (ref 133–144)
TRICYCLICS UR QL SCN: NOT DETECTED

## 2021-11-30 PROCEDURE — 83036 HEMOGLOBIN GLYCOSYLATED A1C: CPT | Performed by: FAMILY MEDICINE

## 2021-11-30 PROCEDURE — 99214 OFFICE O/P EST MOD 30 MIN: CPT | Mod: 25 | Performed by: FAMILY MEDICINE

## 2021-11-30 PROCEDURE — 99397 PER PM REEVAL EST PAT 65+ YR: CPT | Performed by: FAMILY MEDICINE

## 2021-11-30 PROCEDURE — 80306 DRUG TEST PRSMV INSTRMNT: CPT | Performed by: FAMILY MEDICINE

## 2021-11-30 PROCEDURE — 80048 BASIC METABOLIC PNL TOTAL CA: CPT | Performed by: FAMILY MEDICINE

## 2021-11-30 PROCEDURE — 85018 HEMOGLOBIN: CPT | Performed by: FAMILY MEDICINE

## 2021-11-30 PROCEDURE — 36415 COLL VENOUS BLD VENIPUNCTURE: CPT | Performed by: FAMILY MEDICINE

## 2021-11-30 RX ORDER — GLIMEPIRIDE 4 MG/1
4 TABLET ORAL 2 TIMES DAILY
Qty: 180 TABLET | Refills: 1 | Status: SHIPPED | OUTPATIENT
Start: 2021-11-30 | End: 2022-04-18

## 2021-11-30 RX ORDER — PREGABALIN 100 MG/1
100 CAPSULE ORAL 2 TIMES DAILY
Qty: 180 CAPSULE | Refills: 1 | Status: SHIPPED | OUTPATIENT
Start: 2021-11-30 | End: 2022-04-18

## 2021-11-30 RX ORDER — BLOOD SUGAR DIAGNOSTIC
1 STRIP MISCELLANEOUS DAILY
Qty: 100 STRIP | Refills: 3 | Status: SHIPPED | OUTPATIENT
Start: 2021-11-30 | End: 2022-04-18

## 2021-11-30 RX ORDER — HYDROCODONE BITARTRATE AND ACETAMINOPHEN 5; 325 MG/1; MG/1
1 TABLET ORAL 3 TIMES DAILY PRN
Qty: 50 TABLET | Refills: 0 | Status: SHIPPED | OUTPATIENT
Start: 2021-11-30 | End: 2022-01-06

## 2021-11-30 ASSESSMENT — PAIN SCALES - GENERAL: PAINLEVEL: EXTREME PAIN (8)

## 2021-11-30 ASSESSMENT — ASTHMA QUESTIONNAIRES
QUESTION_3 LAST FOUR WEEKS HOW OFTEN DID YOUR ASTHMA SYMPTOMS (WHEEZING, COUGHING, SHORTNESS OF BREATH, CHEST TIGHTNESS OR PAIN) WAKE YOU UP AT NIGHT OR EARLIER THAN USUAL IN THE MORNING: FOUR OR MORE NIGHTS A WEEK
QUESTION_5 LAST FOUR WEEKS HOW WOULD YOU RATE YOUR ASTHMA CONTROL: POORLY CONTROLLED
QUESTION_4 LAST FOUR WEEKS HOW OFTEN HAVE YOU USED YOUR RESCUE INHALER OR NEBULIZER MEDICATION (SUCH AS ALBUTEROL): THREE OR MORE TIMES PER DAY
ACT_TOTALSCORE: 11
QUESTION_1 LAST FOUR WEEKS HOW MUCH OF THE TIME DID YOUR ASTHMA KEEP YOU FROM GETTING AS MUCH DONE AT WORK, SCHOOL OR AT HOME: SOME OF THE TIME
QUESTION_2 LAST FOUR WEEKS HOW OFTEN HAVE YOU HAD SHORTNESS OF BREATH: ONCE OR TWICE A WEEK

## 2021-11-30 ASSESSMENT — ACTIVITIES OF DAILY LIVING (ADL)
CURRENT_FUNCTION: SHOPPING REQUIRES ASSISTANCE
CURRENT_FUNCTION: MONEY MANAGEMENT REQUIRES ASSISTANCE
CURRENT_FUNCTION: PREPARING MEALS REQUIRES ASSISTANCE
CURRENT_FUNCTION: MEDICATION ADMINISTRATION REQUIRES ASSISTANCE
CURRENT_FUNCTION: LAUNDRY REQUIRES ASSISTANCE
CURRENT_FUNCTION: TRANSPORTATION REQUIRES ASSISTANCE
CURRENT_FUNCTION: BATHING REQUIRES ASSISTANCE
CURRENT_FUNCTION: TELEPHONE REQUIRES ASSISTANCE
CURRENT_FUNCTION: HOUSEWORK REQUIRES ASSISTANCE

## 2021-11-30 ASSESSMENT — MIFFLIN-ST. JEOR: SCORE: 1002.99

## 2021-11-30 NOTE — LETTER
December 3, 2021      Gretasharon Hernandezrafael  [address]  [place] [address]              Joeyjulianna,   Overall your lab work looks pretty good, but your sugar is back above our goal of less than 8.  Please make sure you are taking all of your medicines as prescribed.   TIMMY Peguero M.D.       Resulted Orders   Hemoglobin   Result Value Ref Range    Hemoglobin 11.4 (L) 11.7 - 15.7 g/dL   Hemoglobin A1c   Result Value Ref Range    Hemoglobin A1C 8.2 (H) 0.0 - 5.6 %      Comment:      Normal <5.7%   Prediabetes 5.7-6.4%    Diabetes 6.5% or higher     Note: Adopted from ADA consensus guidelines.   Basic metabolic panel   Result Value Ref Range    Sodium 138 133 - 144 mmol/L    Potassium 4.1 3.4 - 5.3 mmol/L    Chloride 106 94 - 109 mmol/L    Carbon Dioxide (CO2) 27 20 - 32 mmol/L    Anion Gap 5 3 - 14 mmol/L    Urea Nitrogen 16 7 - 30 mg/dL    Creatinine 0.79 0.52 - 1.04 mg/dL    Calcium 9.4 8.5 - 10.1 mg/dL    Glucose 157 (H) 70 - 99 mg/dL    GFR Estimate 72 >60 mL/min/1.73m2      Comment:      As of July 11, 2021, eGFR is calculated by the CKD-EPI creatinine equation, without race adjustment. eGFR can be influenced by muscle mass, exercise, and diet. The reported eGFR is an estimation only and is only applicable if the renal function is stable.   Drug Abuse Screen Panel 13, Urine (Pain Care Package) - lab collect   Result Value Ref Range    Cannabinoids (19-ppg-4-carboxy-9-THC) Not Detected Not Detected, Indeterminate      Comment:      Cutoff for a negative cannabinoid is 50 ng/mL or less.    Phencyclidine Not Detected Not Detected, Indeterminate      Comment:      Cutoff for a negative PCP is 25 ng/mL or less.    Cocaine (Benzoylecgonine) Not Detected Not Detected, Indeterminate      Comment:      Cutoff for a negative cocaine is 150 ng/ml or less.    Methamphetamine (d-Methamphetamine) Not Detected Not Detected, Indeterminate      Comment:      Cutoff for a negative methamphetamine is 500 ng/ml or less.     Opiates (Morphine) Not Detected Not Detected, Indeterminate      Comment:      Cutoff for a negative opiate is 100 ng/ml or less.    Amphetamine (d-Amphetamine) Not Detected Not Detected, Indeterminate      Comment:      Cutoff for a negative amphetamine is 500 ng/mL or less.    Benzodiazepines (Nordiazepam) Not Detected Not Detected, Indeterminate      Comment:      Cutoff for a negative benzodiazepine is 150 ng/ml or less.    Tricyclic Antidepressants (Desipramine) Not Detected Not Detected, Indeterminate      Comment:      Cutoff for a negative tricyclic antidepressant is 300 ng/ml or less.    Methadone Not Detected Not Detected, Indeterminate      Comment:      Cutoff for a negative methadone is 200 ng/ml or less.    Barbiturates (Butalbital) Not Detected Not Detected, Indeterminate      Comment:      Cutoff for a negative barbituate is 200 ng/ml or less.    Oxycodone Not Detected Not Detected, Indeterminate      Comment:      Cutoff for a negative oxycodone is 100 ng/mL or less.    Propoxyphene (Norpropoxyphene) Not Detected Not Detected, Indeterminate      Comment:      Cutoff for a negative propoxyphene is 300 ng/ml or less.    Buprenorphine Not Detected Not Detected, Indeterminate      Comment:      Cutoff for a negative buprenorphine is 10 ng/ml or less.

## 2021-11-30 NOTE — LETTER
Opioid / Opioid Plus Controlled Substance Agreement    This is an agreement between you and your provider about the safe and appropriate use of controlled substance/opioids prescribed by your care team. Controlled substances are medicines that can cause physical and mental dependence (abuse).    There are strict laws about having and using these medicines. We here at [facility] are committing to working with you in your efforts to get better. To support you in this work, we ll help you schedule regular office appointments for medicine refills. If we must cancel or change your appointment for any reason, we ll make sure you have enough medicine to last until your next appointment.     As a Provider, I will:    Listen carefully to your concerns and treat you with respect.     Recommend a treatment plan that I believe is in your best interest. This plan may involve therapies other than opioid pain medication.     Talk with you often about the possible benefits, and the risk of harm of any medicine that we prescribe for you.     Provide a plan on how to taper (discontinue or go off) using this medicine if the decision is made to stop its use.    As a Patient, I understand that opioid(s):     Are a controlled substance prescribed by my care team to help me function or work and manage my condition(s).     Are strong medicines and can cause serious side effects such as:    Drowsiness, which can seriously affect my driving ability    A lower breathing rate, enough to cause death    Harm to my thinking ability     Depression     Abuse of and addiction to this medicine    Need to be taken exactly as prescribed. Combining opioids with certain medicines or chemicals (such as illegal drugs, sedatives, sleeping pills, and benzodiazepines) can be dangerous or even fatal. If I stop opioids suddenly, I may have severe withdrawal symptoms.    Do not work for all types of pain nor for all patients. If they re not helpful, I may  be asked to stop them.      The risks, benefits and side effects of these medicine(s) were explained to me. I agree that:  1. I will take part in other treatments as advised by my care team. This may be psychiatry or counseling, physical therapy, behavioral therapy, group treatment or a referral to a specialist.     2. I will keep all my appointments. I understand that this is part of the monitoring of opioids. My care team may require an office visit for EVERY opioid/controlled substance refill. If I miss appointments or don t follow instructions, my care team may stop my medicine.    3. I will take my medicines as prescribed. I will not change the dose or schedule unless my care team tells me to. There will be no refills if I run out early.     4. I may be asked to come to the clinic and complete a urine drug test or complete a pill count at any time. If I don t give a urine sample or participate in a pill count, the care team may stop my medicine.    5. I will only receive prescriptions from this clinic for chronic pain. If I am treated by another provider for acute pain issues, I will tell them that I am taking opioid pain medication for chronic pain and that I have a treatment agreement with this provider. I will inform my [facility] care team within one business day if I am given a prescription for any pain medication by another healthcare provider. My [facility] care team can contact other providers and pharmacists about my use of any medicines.    6. It is up to me to make sure that I don t run out of my medicines on weekends or holidays. If my care team is willing to refill my opioid prescription without a visit, I must request refills only during office hours. Refills may take up to 3 business days to process. I will use one pharmacy to fill all my opioid and other controlled substance prescriptions. I will notify the clinic about any changes to my insurance or medication  availability.    7. I am responsible for my prescriptions. If the medicine/prescription is lost, stolen or destroyed, it will not be replaced. I also agree not to share controlled substance medicines with anyone.    8. I am aware I should not use any illegal or recreational drugs. I agree not to drink alcohol unless my care team says I can.       9. If I enroll in the [place] Medical Cannabis program, I will tell my care team prior to my next refill.     10. I will tell my care team right away if I become pregnant, have a new medical problem treated outside of my regular clinic, or have a change in my medications.    11. I understand that this medicine can affect my thinking, judgment and reaction time. Alcohol and drugs affect the brain and body, which can affect the safety of my driving. Being under the influence of alcohol or drugs can affect my decision-making, behaviors, personal safety, and the safety of others. Driving while impaired (DWI) can occur if a person is driving, operating, or in physical control of a car, motorcycle, boat, snowmobile, ATV, motorbike, off-road vehicle, or any other motor vehicle ([place] Statute 169A.20). I understand the risk if I choose to drive or operate any vehicle or machinery.    I understand that if I do not follow any of the conditions above, my prescriptions or treatment may be stopped or changed.          Opioids  What You Need to Know    What are opioids?   Opioids are pain medicines that must be prescribed by a doctor. They are also known as narcotics.     Examples are:   1. morphine (MS Contin, Glenna)  2. oxycodone (Oxycontin)  3. oxycodone and acetaminophen (Percocet)  4. hydrocodone and acetaminophen (Vicodin, Norco)   5. fentanyl patch (Duragesic)   6. hydromorphone (Dilaudid)   7. methadone  8. codeine (Tylenol #3)     What do opioids do well?   Opioids are best for severe short-term pain such as after a surgery or injury. They may work well for cancer pain. They may  help some people with long-lasting (chronic) pain.     What do opioids NOT do well?   Opioids never get rid of pain entirely, and they don t work well for most patients with chronic pain. Opioids don t reduce swelling, one of the causes of pain.                                    Other ways to manage chronic pain and improve function include:       Treat the health problem that may be causing pain    Anti-inflammation medicines, which reduce swelling and tenderness, such as ibuprofen (Advil, Motrin) or naproxen (Aleve)    Acetaminophen (Tylenol)    Antidepressants and anti-seizure medicines, especially for nerve pain    Topical treatments such as patches or creams    Injections or nerve blocks    Chiropractic or osteopathic treatment    Acupuncture, massage, deep breathing, meditation, visual imagery, aromatherapy    Use heat or ice at the pain site    Physical therapy     Exercise    Stop smoking    Take part in therapy       Risks and side effects     Talk to your doctor before you start or decide to keep taking opioids. Possible side effects include:      Lowering your breathing rate enough to cause death    Overdose, including death, especially if taking higher than prescribed doses    Worse depression symptoms; less pleasure in things you usually enjoy    Feeling tired or sluggish    Slower thoughts or cloudy thinking    Being more sensitive to pain over time; pain is harder to control    Trouble sleeping or restless sleep    Changes in hormone levels (for example, less testosterone)    Changes in sex drive or ability to have sex    Constipation    Unsafe driving    Itching and sweating    Dizziness    Nausea, throwing up and dry mouth    What else should I know about opioids?    Opioids may lead to dependence, tolerance, or addiction.      Dependence means that if you stop or reduce the medicine too quickly, you will have withdrawal symptoms. These include loose poop (diarrhea), jitters, flu-like symptoms,  nervousness and tremors. Dependence is not the same as addiction.                       Tolerance means needing higher doses over time to get the same effect. This may increase the chance of serious side effects.      Addiction is when people improperly use a substance that harms their body, their mind or their relations with others. Use of opiates can cause a relapse of addiction if you have a history of drug or alcohol abuse.      People who have used opioids for a long time may have a lower quality of life, worse depression, higher levels of pain and more visits to doctors.    You can overdose on opioids. Take these steps to lower your risk of overdose:    1. Recognize the signs:  Signs of overdose include decrease or loss of consciousness (blackout), slowed breathing, trouble waking up and blue lips. If someone is worried about overdose, they should call 911.    2. Talk to your doctor about Narcan (naloxone).   If you are at risk for overdose, you may be given a prescription for Narcan. This medicine very quickly reverses the effects of opioids.   If you overdose, a friend or family member can give you Narcan while waiting for the ambulance. They need to know the signs of overdose and how to give Narcan.     3. Don't use alcohol or street drugs.   Taking them with opioids can cause death.    4. Do not take any of these medicines unless your doctor says it s OK. Taking these with opioids can cause death:    Benzodiazepines, such as lorazepam (Ativan), alprazolam (Xanax) or diazepam (Valium)    Muscle relaxers, such as cyclobenzaprine (Flexeril)    Sleeping pills like zolpidem (Ambien)     Other opioids      How to keep you and other people safe while taking opioids:    1. Never share your opioids with others.  Opioid medicines are regulated by the Drug Enforcement Agency ([place]). Selling or sharing medications is a criminal act.    2. Be sure to store opioids in a secure place, locked up if possible. Young children  can easily swallow them and overdose.    3. When you are traveling with your medicines, keep them in the original bottles. If you use a pill box, be sure you also carry a copy of your medicine list from your clinic or pharmacy.    4. Safe disposal of opioids    Most pharmacies have places to get rid of medicine, called disposal kiosks. Medicine disposal options are also available in every [facility]. Search your county and  medication disposal  to find more options. You can find more details at:  [email or web address]     I agree that my provider, clinic care team, and pharmacy may work with any city, state or federal law enforcement agency that investigates the misuse, sale, or other diversion of my controlled medicine. I will allow my provider to discuss my care with, or share a copy of, this agreement with any other treating provider, pharmacy or emergency room where I receive care.    I have read this agreement and have asked questions about anything I did not understand.    _______________________________________________________  Patient Signature - Enrique Pruitt _____________________                   Date     _______________________________________________________  Provider Signature - Lana Peguero MD   _____________________                   Date     _______________________________________________________  Witness Signature (required if provider not present while patient signing)   _____________________                   Date

## 2021-12-01 ASSESSMENT — ASTHMA QUESTIONNAIRES: ACT_TOTALSCORE: 11

## 2021-12-03 NOTE — RESULT ENCOUNTER NOTE
Please mail results and note to patient:    Enrique,  Overall your lab work looks pretty good, but your sugar is back above our goal of less than 8.  Please make sure you are taking all of your medicines as prescribed.  TIMMY Peguero M.D.

## 2022-01-06 DIAGNOSIS — E11.42 DIABETIC POLYNEUROPATHY ASSOCIATED WITH TYPE 2 DIABETES MELLITUS (H): ICD-10-CM

## 2022-01-06 RX ORDER — HYDROCODONE BITARTRATE AND ACETAMINOPHEN 5; 325 MG/1; MG/1
1 TABLET ORAL 3 TIMES DAILY PRN
Qty: 50 TABLET | Refills: 0 | Status: SHIPPED | OUTPATIENT
Start: 2022-01-06 | End: 2022-02-22

## 2022-01-06 NOTE — TELEPHONE ENCOUNTER
.Reason for Call:  Medication or medication refill:    Do you use a [facility]?  Name of the pharmacy and phone number for the current request[id number] [address] [facility]    Name of the medication requested: HYDROcodone-acetaminophen (NORCO) 5-325 MG tablet    Other request: NA    Can we leave a detailed message on this number? YES    Phone number patient can be reached at: Home number on file [phone] (home)    Best Time: Any    Call taken on 1/6/2022 at 1:28 PM by Erika Ohara

## 2022-01-07 ENCOUNTER — TELEPHONE (OUTPATIENT)
Dept: FAMILY MEDICINE | Facility: CLINIC | Age: 77
End: 2022-01-07
Payer: COMMERCIAL

## 2022-01-07 NOTE — TELEPHONE ENCOUNTER
Patient's daughter called.  Asking for Hydrocodone.  Note this was completed yesterday.    No further questions.    Shaina Iverson RN, [facility]

## 2022-02-02 ENCOUNTER — PATIENT OUTREACH (OUTPATIENT)
Dept: GERIATRIC MEDICINE | Facility: CLINIC | Age: 77
End: 2022-02-02
Payer: COMMERCIAL

## 2022-02-02 NOTE — PROGRESS NOTES
[facility] Care Coordination Contact    Called adult daughter Pilo to schedule annual HRA home visit. HRA has been scheduled for 2/7/22 at 2pm.     Regine Stapleton RN, PHN  [facility]  [phone]

## 2022-02-05 DIAGNOSIS — E11.42 TYPE 2 DIABETES MELLITUS WITH DIABETIC POLYNEUROPATHY, WITHOUT LONG-TERM CURRENT USE OF INSULIN (H): ICD-10-CM

## 2022-02-07 ENCOUNTER — PATIENT OUTREACH (OUTPATIENT)
Dept: GERIATRIC MEDICINE | Facility: CLINIC | Age: 77
End: 2022-02-07
Payer: COMMERCIAL

## 2022-02-07 RX ORDER — PIOGLITAZONEHYDROCHLORIDE 45 MG/1
TABLET ORAL
Qty: 90 TABLET | Refills: 0 | Status: SHIPPED | OUTPATIENT
Start: 2022-02-07 | End: 2022-04-18

## 2022-02-07 NOTE — TELEPHONE ENCOUNTER
Prescription approved per [facility] Refill Protocol.  Monalisa Gutierrez RN, BSN   [facility]

## 2022-02-07 NOTE — PROGRESS NOTES
[facility] Care Coordination Contact    [facility] Home Visit Assessment     Home visit for Health Risk Assessment with Enrique Pruitt completed on February 7, 2022. HRA completed via telephone d/t COVID 19 concern.     Type of residence:: Private home - stairs  Current living arrangement:: I live in a private home with family     Assessment completed with:: Patient,Care Team Member,Family,Other ()    Current Care Plan  Member currently receiving the following home care services:     Member currently receiving the following community resources: DME,PCA,Housekeeping/Chore Agency,Transportation Services      Medication Review  Medication reconciliation completed in Epic: Yes  Medication set-up & administration: Family/informal caregiver sets up weekly.  Family caregiver administers medications.  Medication Risk Assessment Medication (1 or more, place referral to MTM): N/A: No risk factors identified  MTM Referral Placed: No: No risk factors idenified    Mental/Behavioral Health   Depression Screening:   PHQ-2 Total Score (Adult) - Positive if 3 or more points; Administer PHQ-9 if positive: 0       Mental health DX:: No        Falls Assessment:   Fallen 2 or more times in the past year?: No   Any fall with injury in the past year?: No    ADL/IADL Dependencies:   Dependent ADLs:: Ambulation-walker,Bathing,Dressing,Eating,Grooming,Incontinence,Transfers,Wheelchair-with assist,Toileting  Dependent IADLs:: Cleaning,Cooking,Laundry,Shopping,Meal Preparation,Medication Management,Money Management,Transportation,Incontinence    [facility] sponsored benefits: Shared information re: Silver Sneakers/gym memberships, ASA, Calcium +D.    PCA Assessment completed at visit: Yes Annual PCA assessment indicated 25 units per day of PCA. This is the same as the previous assessment.     Elderly Waiver Eligibility: Yes-will continue on EW    Care Plan & Recommendations: Enrique Pruitt is 76 years  old and lives with her daughter. Present for HRA was Enrique, daughter/Oudmaphone and /Aekrit ([facility].) Enrique has a health h/o Diabetes, Diabetic polyneuropathy, chronic pain syndrome, Weakness in JAXON LE, Asthma, HTN, Hyperlipidemia and CKD Stage 3. Enrique states her health is stable, she continues to have chronic pain in back, JAXON LE and both hands. In addition to pain, Enrique reports numbness in both LE and hands. Enrique reports needing assistance with all IADLS and most ADLS. Enrique reports ambulating with hands on assistance and walker in the home. Enrique states she uses a w/c for all appointments and any outings outside of home. Enrique reports no falls, ED or hospitalization within the past year. Enrique would like to resume homemaker, DME and PCA service. Enrique reports current support and services are meeting needs. No request for new supplies or equipment.     See [facility] for detailed assessment information.    Follow-Up Plan: Member informed of future contact, plan to f/u with member with a 6 month telephone assessment.  Contact information shared with member and family, encouraged member to call with any questions or concerns at any time.    Eads care continuum providers: Please refer to Health Care Home on the [facility] Problem List to view this patient's [facility] Care Plan Summary.    Regine Stapleton RN, PHN  [facility]  [phone]

## 2022-02-11 ENCOUNTER — PATIENT OUTREACH (OUTPATIENT)
Dept: GERIATRIC MEDICINE | Facility: CLINIC | Age: 77
End: 2022-02-11
Payer: COMMERCIAL

## 2022-02-11 NOTE — PROGRESS NOTES
[facility] Care Coordination Contact    [facility]:  Emailed completed PCA assessment to [facility].  Faxed copy of PCA assessment to PCA Agency and mailed copy to member.  Faxed MD Communication to PCP.     **THIS ASSESSMENT WAS DONE OVER THE PHONE, SENT POC/PCA SIG PAGES TO MEMBER ASKING THEM TO SIGN AND RETURN IN SASE**    Lee Ann Mcgee  Care Management Specialist  [facility]  [phone]

## 2022-02-11 NOTE — LETTER
[facility]  [address]  [place] [address]  Phone:  [phone]  Fax:  [phone]      February 11, 2022    ENRIQUE NEWBERRY  [address] [address]    Dear Enrique,    Evan is a copy of your completed PCA Assessment and Service Plan.  This is for your records and no action is required by you.  If you have additional questions regarding your assessment please contact me at [phone]. If you feel that your needs are not being met, please contact the Clinical Supervisor at [phone].    Sincerely,    Regine Stapleton RN, PHN    E-mail:[email or web address]  Phone: [phone]    Care Manager  [facility]            Enclosure:  Completed PCA assessment

## 2022-02-21 DIAGNOSIS — E11.42 DIABETIC POLYNEUROPATHY ASSOCIATED WITH TYPE 2 DIABETES MELLITUS (H): ICD-10-CM

## 2022-02-22 RX ORDER — HYDROCODONE BITARTRATE AND ACETAMINOPHEN 5; 325 MG/1; MG/1
1 TABLET ORAL 3 TIMES DAILY PRN
Qty: 50 TABLET | Refills: 0 | Status: SHIPPED | OUTPATIENT
Start: 2022-02-22 | End: 2022-04-06

## 2022-02-24 ENCOUNTER — PATIENT OUTREACH (OUTPATIENT)
Dept: GERIATRIC MEDICINE | Facility: CLINIC | Age: 77
End: 2022-02-24
Payer: COMMERCIAL

## 2022-02-24 NOTE — PROGRESS NOTES
[facility] Care Coordination Contact    Received after visit chart from care coordinator.  Completed following tasks: Mailed copy of care plan to client, Updated services in access, Submitted referrals/auths for hmkg & supplies and mailed medication disposal info.  Mailed HCD info.   and Provider Signature - No POC Shared:  Member indicates that they do not want their POC shared with any EW providers.     Ioana Lemus  Case Management Specialist  [facility]  [phone]

## 2022-02-24 NOTE — LETTER
[facility] [employer] Advance Care Planning       Enrique Pruitt  [address]  [place] [address]      Dear Enrique    You shared with me your interest in receiving information on Advance Care Planning and Health Care Directives. Discussing and making decisions about this part of our health is very important.  A Health Care Directive is a written document that outlines your goals, values, beliefs and choices for health care and medical treatment in the event you are unable to speak for yourself.     We greatly value the opportunity to assist you in documenting your choices and to honor your   wishes. We ve enclosed [place] Care Directive, worksheet and educational materials to help you get started thinking about your values and goals. We have several options for additional resources:       Health Care Directives and Advance Care Planning resources can be viewed and printed   for free at our web site:  [email or web address].       Free group classes on Advance Care Planning and completing a Health Care Directive are available at multiple locations and times. These classes are led by trained staff who will provide information and guide you through a Health Care Directive.  They can also review, notarize and add your Health Care Directive to your medical record. Gateway for a class at [email or web address] or by calling [employer] at [phone] or toll free [phone].      COPIES of completed Health Care Directives can be brought or mailed to any of our   locations, including the address listed below. You can also email a copy to [email or web address] .      Email or call me at the contact information listed below for questions, assistance, or to   make an appointment to discuss creating a Health Care Directive. You can also contact   our [facility] [employer] Department for questions or assistance.       Sincerely,     Regine Stapleton RN / [employer] Care Coordinator  Ph   [phone] / Fax [phone]  [email or web address]

## 2022-02-24 NOTE — LETTER
February 24, 2022      ENRIQUE NEWBERRY  [address]  [place] [address]      Dear Enrique:    At [facility], we are dedicated to improving your health and well-being. Enclosed is the Comprehensive Care Plan that we developed with you on 2/7/2022. Please review the Care Plan carefully.    As a reminder, some of the things we discussed at your visit include:    Your physical and mental health    Ways to reduce falls    Health care needs you may have    Don t forget to contact your care coordinator if you:    Have been hospitalized or plan to be hospitalized     Have had a fall     Have experienced a change in physical health    Are experiencing emotional problems     If you do not agree with your Care Plan, have questions about it, or have experienced a change in your needs, please call me at [phone]. If you are hearing impaired, please call the [place] Relay at 118 or [phone] (isjzml-bf-xuvjxu relay service).    Sincerely,    Regine Stapleton RN, PHN    E-mail:[email or web address]  Phone: [phone]    Care Manager  [facility] is a health plan that contracts with both Medicare and the [place] Medical Assistance (Medicaid) program to provide benefits of both programs to enrollees. Enrollment in [facility] depends on contract renewal.    [id number])     [id number] (11/18)

## 2022-03-14 ENCOUNTER — TELEPHONE (OUTPATIENT)
Dept: FAMILY MEDICINE | Facility: CLINIC | Age: 77
End: 2022-03-14
Payer: COMMERCIAL

## 2022-03-14 ENCOUNTER — MEDICAL CORRESPONDENCE (OUTPATIENT)
Dept: HEALTH INFORMATION MANAGEMENT | Facility: CLINIC | Age: 77
End: 2022-03-14
Payer: COMMERCIAL

## 2022-03-16 ENCOUNTER — MEDICAL CORRESPONDENCE (OUTPATIENT)
Dept: HEALTH INFORMATION MANAGEMENT | Facility: CLINIC | Age: 77
End: 2022-03-16
Payer: COMMERCIAL

## 2022-03-16 ENCOUNTER — TELEPHONE (OUTPATIENT)
Dept: FAMILY MEDICINE | Facility: CLINIC | Age: 77
End: 2022-03-16
Payer: COMMERCIAL

## 2022-03-16 DIAGNOSIS — E11.42 DIABETIC POLYNEUROPATHY ASSOCIATED WITH TYPE 2 DIABETES MELLITUS (H): ICD-10-CM

## 2022-03-16 NOTE — TELEPHONE ENCOUNTER
So I have explained this to the patient and her daughter like 100 times.  We require a pain follow-up every 3 months.  This can be done virtually if not in the office.  And I told her last month.  So if the schedule I I will send the refill

## 2022-03-16 NOTE — TELEPHONE ENCOUNTER
This patients daughter is calling to request a refill for her mother for HYDROcodone-acetaminophen (NORCO) 5-325 MG tablet.  [facility] in [place].  Nohemi Lainez,  [facility]

## 2022-03-17 NOTE — TELEPHONE ENCOUNTER
Called with a Urdu  and left message to return call for an appointment to be able to fill medications.    Dotty Fritz RN [facility]

## 2022-03-18 RX ORDER — HYDROCODONE BITARTRATE AND ACETAMINOPHEN 5; 325 MG/1; MG/1
1 TABLET ORAL 3 TIMES DAILY PRN
Qty: 50 TABLET | Refills: 0 | OUTPATIENT
Start: 2022-03-18

## 2022-03-25 ENCOUNTER — DOCUMENTATION ONLY (OUTPATIENT)
Dept: OTHER | Facility: CLINIC | Age: 77
End: 2022-03-25
Payer: COMMERCIAL

## 2022-04-06 RX ORDER — HYDROCODONE BITARTRATE AND ACETAMINOPHEN 5; 325 MG/1; MG/1
1 TABLET ORAL 3 TIMES DAILY PRN
Qty: 50 TABLET | Refills: 0 | Status: SHIPPED | OUTPATIENT
Start: 2022-04-06 | End: 2022-05-18

## 2022-04-06 NOTE — ADDENDUM NOTE
Addended by: SANDEEP MCGARRY on: 4/6/2022 02:41 PM     Modules accepted: Orders    
Addended by: SIDRA GLORIA on: 4/6/2022 03:49 PM     Modules accepted: Orders    
no

## 2022-04-06 NOTE — TELEPHONE ENCOUNTER
Patient has an appointment scheduled for 04/18/22 at 0900.  She is hoping to get a partial prescription now to last until this appointment.    Prescription pended if appropriate.

## 2022-04-18 ENCOUNTER — VIRTUAL VISIT (OUTPATIENT)
Dept: FAMILY MEDICINE | Facility: CLINIC | Age: 77
End: 2022-04-18
Payer: COMMERCIAL

## 2022-04-18 DIAGNOSIS — N18.2 CKD (CHRONIC KIDNEY DISEASE) STAGE 2, GFR 60-89 ML/MIN: ICD-10-CM

## 2022-04-18 DIAGNOSIS — E11.42 TYPE 2 DIABETES MELLITUS WITH DIABETIC POLYNEUROPATHY, WITHOUT LONG-TERM CURRENT USE OF INSULIN (H): Primary | ICD-10-CM

## 2022-04-18 DIAGNOSIS — E78.5 HYPERLIPIDEMIA LDL GOAL <70: ICD-10-CM

## 2022-04-18 DIAGNOSIS — E11.42 DIABETIC POLYNEUROPATHY ASSOCIATED WITH TYPE 2 DIABETES MELLITUS (H): ICD-10-CM

## 2022-04-18 DIAGNOSIS — I10 ESSENTIAL HYPERTENSION WITH GOAL BLOOD PRESSURE LESS THAN 140/90: ICD-10-CM

## 2022-04-18 DIAGNOSIS — F11.90 CHRONIC, CONTINUOUS USE OF OPIOIDS: ICD-10-CM

## 2022-04-18 DIAGNOSIS — J45.20 MILD INTERMITTENT ASTHMA WITHOUT COMPLICATION: ICD-10-CM

## 2022-04-18 PROCEDURE — 99214 OFFICE O/P EST MOD 30 MIN: CPT | Mod: 95 | Performed by: FAMILY MEDICINE

## 2022-04-18 RX ORDER — PREGABALIN 100 MG/1
100 CAPSULE ORAL 2 TIMES DAILY
Qty: 180 CAPSULE | Refills: 1 | Status: SHIPPED | OUTPATIENT
Start: 2022-04-18 | End: 2022-10-31

## 2022-04-18 RX ORDER — GLIMEPIRIDE 4 MG/1
4 TABLET ORAL 2 TIMES DAILY
Qty: 180 TABLET | Refills: 1 | Status: SHIPPED | OUTPATIENT
Start: 2022-04-18 | End: 2022-10-31

## 2022-04-18 RX ORDER — LANCETS
EACH MISCELLANEOUS
Qty: 100 EACH | Refills: 6 | Status: SHIPPED | OUTPATIENT
Start: 2022-04-18 | End: 2024-08-19

## 2022-04-18 RX ORDER — ALBUTEROL SULFATE 90 UG/1
2 AEROSOL, METERED RESPIRATORY (INHALATION) EVERY 4 HOURS PRN
Qty: 8.5 G | Refills: 4 | Status: SHIPPED | OUTPATIENT
Start: 2022-04-18 | End: 2022-10-31

## 2022-04-18 RX ORDER — PIOGLITAZONEHYDROCHLORIDE 45 MG/1
45 TABLET ORAL DAILY
Qty: 90 TABLET | Refills: 1 | Status: SHIPPED | OUTPATIENT
Start: 2022-04-18 | End: 2022-10-25

## 2022-04-18 RX ORDER — GLUCOSAMINE HCL/CHONDROITIN SU 500-400 MG
CAPSULE ORAL
Qty: 100 EACH | Refills: 3 | Status: SHIPPED | OUTPATIENT
Start: 2022-04-18

## 2022-04-18 NOTE — PROGRESS NOTES
Enrique is a 77 year old who is being evaluated via a billable telephone visit.      What phone number would you like to be contacted at? [phone]  How would you like to obtain your AVS? Mail a copy    Assessment & Plan     Type 2 diabetes mellitus with diabetic polyneuropathy, without long-term current use of insulin (H)  Due for recheck.  Last A1c a little bit above goal of 8 and we made some adjustments.  Also in need of a new glucometer which she uses twice daily.  - Lipid panel reflex to direct LDL Fasting; Future  - Albumin Random Urine Quantitative with Creat Ratio; Future  - glimepiride (AMARYL) 4 MG tablet; Take 1 tablet (4 mg) by mouth 2 times daily  - Hemoglobin A1c; Future  - Comprehensive metabolic panel; Future  - pioglitazone (ACTOS) 45 MG tablet; Take 1 tablet (45 mg) by mouth daily  - sitagliptin (JANUVIA) 50 MG tablet; Take 1 tablet (50 mg) by mouth daily  - blood glucose monitoring (NO BRAND SPECIFIED) meter device kit; Use to test blood sugar 2 times daily or as directed. Preferred blood glucose meter OR supplies to accompany: Blood Glucose Monitor Brands: per insurance.  - blood glucose (NO BRAND SPECIFIED) test strip; Use to test blood sugar 2 times daily or as directed. To accompany: Blood Glucose Monitor Brands: per insurance.  - blood glucose calibration (NO BRAND SPECIFIED) solution; To accompany: Blood Glucose Monitor Brands: per insurance.  - thin (NO BRAND SPECIFIED) lancets; Use with lanceting device. To accompany: Blood Glucose Monitor Brands: per insurance.  - alcohol swab prep pads; Use to swab area of injection/palomo as directed.    CKD (chronic kidney disease) stage 2, GFR 60-89 ml/min  Improvement overall during the past couple of years.  We will continue to follow.    Diabetic polyneuropathy associated with type 2 diabetes mellitus  Stable on current regimen.  Continue same plan and routine follow-up.   - pregabalin (LYRICA) 100 MG capsule; Take 1 capsule (100 mg) by mouth 2  times daily    Essential hypertension with goal blood pressure less than 140/90  Stable on current regimen.  Continue same plan and routine follow-up.     Hyperlipidemia LDL goal <70  Stable on current regimen.  Continue same plan and routine follow-up.     Chronic, continuous use of opioids  Stable and monitored.  Needs PHQ-9 and PHUONG-7.    Mild intermittent asthma without complication  Stable on current regimen.  Continue same plan and routine follow-up.   - albuterol (PROAIR HFA/PROVENTIL HFA/VENTOLIN HFA) 108 (90 Base) MCG/ACT inhaler; Inhale 2 puffs into the lungs every 4 hours as needed for wheezing       See Patient Instructions    Return in about 3 months (around 7/18/2022) for Follow up on Pain, In Office or Video, can call for refills.    Lana Peguero MD  [facility] YOLA Nunez is a 77 year old who presents for the following health issues  accompanied by her daughter.    History of Present Illness       Diabetes:   She presents for follow up of diabetes.  She is checking home blood glucose one time daily. She checks blood glucose before and after meals.  Blood glucose is never over 200 and never under 70. When her blood glucose is low, the patient is asymptomatic for confusion, blurred vision, lethargy and reports not feeling dizzy, shaky, or weak.  She has no concerns regarding her diabetes at this time.  She is having redness, sores, or blisters on feet. The patient has not had a diabetic eye exam in the last 12 months.         She eats 2-3 servings of fruits and vegetables daily.She consumes 3 sweetened beverage(s) daily.She exercises with enough effort to increase her heart rate 10 to 19 minutes per day.  She exercises with enough effort to increase her heart rate 3 or less days per week.   She is taking medications regularly.       Diabetes Follow-up    How often are you checking your blood sugar? One time daily  What time of day are you checking your  blood sugars (select all that apply)?  Before and after meals  Have you had any blood sugars above 200?  No  Have you had any blood sugars below 70?  No    What symptoms do you notice when your blood sugar is low?  None    What concerns do you have today about your diabetes? None     Do you have any of these symptoms? (Select all that apply)  Redness, sores, or blisters on feet    Have you had a diabetic eye exam in the last 12 months? No        BP Readings from Last 2 Encounters:   11/30/21 131/75   03/19/21 121/69     Hemoglobin A1C POCT (%)   Date Value   03/19/2021 7.1 (H)   10/06/2020 7.1 (H)     Hemoglobin A1C (%)   Date Value   11/30/2021 8.2 (H)     LDL Cholesterol Calculated (mg/dL)   Date Value   03/19/2021 41   02/11/2020 44               Visit with patient today and her daughter in follow-up of ongoing chronic issues.  Generally doing well overall.  Will need some upcoming refills and a recheck.    Review of Systems   Constitutional, HEENT, cardiovascular, pulmonary, gi and gu systems are negative, except as otherwise noted.      Objective    Vitals - Patient Reported  Pain Score: Severe Pain (7)  Pain Loc: Foot      Vitals:  No vitals were obtained today due to virtual visit.    Physical Exam   healthy, alert and no distress  PSYCH: Alert and oriented times 3; coherent speech, normal   rate and volume, able to articulate logical thoughts, able   to abstract reason, no tangential thoughts, no hallucinations   or delusions  Her affect is normal  RESP: No cough, no audible wheezing, able to talk in full sentences  Remainder of exam unable to be completed due to telephone visits    Past labs reviewed with the patient.             Phone call duration: 14 minutes

## 2022-04-27 ENCOUNTER — TELEPHONE (OUTPATIENT)
Dept: FAMILY MEDICINE | Facility: CLINIC | Age: 77
End: 2022-04-27
Payer: COMMERCIAL

## 2022-04-29 ENCOUNTER — TELEPHONE (OUTPATIENT)
Dept: FAMILY MEDICINE | Facility: CLINIC | Age: 77
End: 2022-04-29
Payer: COMMERCIAL

## 2022-04-29 NOTE — LETTER
April 29, 2022    Enrique Pruitt  [address]  [place] [address]    Dear Willam Nunez cares about your health and your health plan.  I have reviewed your medical conditions, medication list and lab results, and am making recommendations based on this review to better manage your health.    You are in particular need of attention regarding:  -Depression/Anxiety    I am recommending that you:     Please fill out the attached depression and anxiety questionnaires and mail them back in the envelope provided.    Please call us at the [facility]-[phone] if you have any questions or concerns.    Best Regards,    Dr. Peguero

## 2022-04-29 NOTE — TELEPHONE ENCOUNTER
Patients daughter returned call. Since it is the daughter calling back we will mail forms for the patient to fill out and mail back.    She said she will watch for forms for her mother to fill out.    Dotty Fritz RN [facility]

## 2022-04-29 NOTE — TELEPHONE ENCOUNTER
Called pt with  services. Per Dr. Peguero pt will need to fill out phq9 and gad7. LVM asking pt to call back. Will try again Monday if pt has not called back.

## 2022-05-16 ENCOUNTER — TELEPHONE (OUTPATIENT)
Dept: FAMILY MEDICINE | Facility: CLINIC | Age: 77
End: 2022-05-16
Payer: COMMERCIAL

## 2022-05-16 DIAGNOSIS — L30.9 DERMATITIS: ICD-10-CM

## 2022-05-16 DIAGNOSIS — E11.42 DIABETIC POLYNEUROPATHY ASSOCIATED WITH TYPE 2 DIABETES MELLITUS (H): ICD-10-CM

## 2022-05-18 RX ORDER — HYDROCODONE BITARTRATE AND ACETAMINOPHEN 5; 325 MG/1; MG/1
1 TABLET ORAL 3 TIMES DAILY PRN
Qty: 50 TABLET | Refills: 0 | Status: SHIPPED | OUTPATIENT
Start: 2022-05-18 | End: 2022-06-10

## 2022-05-18 NOTE — TELEPHONE ENCOUNTER
[facility] of this [facility] since [facility]  Due for follow up with PCP approximately 7/18/22- assist with scheduling video visit with Dr. Peguero . PHQ9 and GAD7 not up to date and have been mailed.  Please contact patient and make sure completes or mail new ones out   CSA 1/21/22  UDS 11/2021  Medication refilled

## 2022-05-19 RX ORDER — TRIAMCINOLONE ACETONIDE 1 MG/G
CREAM TOPICAL
Qty: 60 G | Refills: 2 | Status: SHIPPED | OUTPATIENT
Start: 2022-05-19 | End: 2022-10-06

## 2022-06-05 DIAGNOSIS — E78.5 HYPERLIPIDEMIA LDL GOAL <70: ICD-10-CM

## 2022-06-07 RX ORDER — SIMVASTATIN 20 MG
TABLET ORAL
Qty: 90 TABLET | Refills: 0 | Status: SHIPPED | OUTPATIENT
Start: 2022-06-07 | End: 2022-10-31

## 2022-06-07 NOTE — TELEPHONE ENCOUNTER
Routing refill request to provider for review/approval because:  Morena Jean Baptiste BSN, RN

## 2022-06-09 ENCOUNTER — TELEPHONE (OUTPATIENT)
Dept: FAMILY MEDICINE | Facility: CLINIC | Age: 77
End: 2022-06-09

## 2022-06-09 ENCOUNTER — LAB (OUTPATIENT)
Dept: LAB | Facility: CLINIC | Age: 77
End: 2022-06-09
Payer: COMMERCIAL

## 2022-06-09 DIAGNOSIS — E11.42 DIABETIC POLYNEUROPATHY ASSOCIATED WITH TYPE 2 DIABETES MELLITUS (H): ICD-10-CM

## 2022-06-09 DIAGNOSIS — E11.42 TYPE 2 DIABETES MELLITUS WITH DIABETIC POLYNEUROPATHY, WITHOUT LONG-TERM CURRENT USE OF INSULIN (H): ICD-10-CM

## 2022-06-09 LAB
CHOLEST SERPL-MCNC: 155 MG/DL
CREAT UR-MCNC: 88 MG/DL
FASTING STATUS PATIENT QL REPORTED: NO
HBA1C MFR BLD: 8.8 % (ref 0–5.6)
HDLC SERPL-MCNC: 68 MG/DL
LDLC SERPL CALC-MCNC: 56 MG/DL
MICROALBUMIN UR-MCNC: 79 MG/L
MICROALBUMIN/CREAT UR: 89.77 MG/G CR (ref 0–25)
NONHDLC SERPL-MCNC: 87 MG/DL
TRIGL SERPL-MCNC: 157 MG/DL

## 2022-06-09 PROCEDURE — 82043 UR ALBUMIN QUANTITATIVE: CPT

## 2022-06-09 PROCEDURE — 83036 HEMOGLOBIN GLYCOSYLATED A1C: CPT

## 2022-06-09 PROCEDURE — 36415 COLL VENOUS BLD VENIPUNCTURE: CPT

## 2022-06-09 PROCEDURE — 80061 LIPID PANEL: CPT

## 2022-06-09 NOTE — TELEPHONE ENCOUNTER
Patient's daughter came to clinic and asked in person to have HYDROcodone-acetaminophen refilled.     Last refill was sent on 5/18/22 to [id number] - [place], [place] - [address] [facility] [address]    Please review and advise.

## 2022-06-10 RX ORDER — HYDROCODONE BITARTRATE AND ACETAMINOPHEN 5; 325 MG/1; MG/1
1 TABLET ORAL 3 TIMES DAILY PRN
Qty: 50 TABLET | Refills: 0 | Status: SHIPPED | OUTPATIENT
Start: 2022-06-10 | End: 2022-07-25

## 2022-06-13 NOTE — RESULT ENCOUNTER NOTE
Call patient / daughter (and send a copy of labs):  Her sugar is still running a bit high.  Is she taking all of her diabetes meds as prescribed?  Metformin, glimipiride, marilee العراقي M.D.

## 2022-06-14 ENCOUNTER — TELEPHONE (OUTPATIENT)
Dept: FAMILY MEDICINE | Facility: CLINIC | Age: 77
End: 2022-06-14

## 2022-06-14 NOTE — TELEPHONE ENCOUNTER
Attempted to contact patient regarding result note below. Yariel  assisted with call. No answer, left voicemail requesting call back.      Lana Peguero MD   6/12/2022  9:45 PM CDT Back to Top        Call patient / daughter (and send a copy of labs):  Her sugar is still running a bit high.  Is she taking all of her diabetes meds as prescribed?  Metformin, glimipiride, abdullahiuvia, actos     S. BRIA Knapp RN, BSN  [facility]

## 2022-06-14 NOTE — LETTER
June 21, 2022      Enrique Pruitt  [address]  [place] [address]              Your sugar is still running a bit high.  Are you taking all of your diabetes meds as prescribed?  Metformin, glimipiride, marilee العراقي M.D

## 2022-06-16 NOTE — TELEPHONE ENCOUNTER
With Cuban , called the patient.  Left message with [facility] number to call back.      Need to give the message from Dr. Peguero and inquire about medications.    The patient's phone number is the same phone number as her daughter's.    Shaina Iverson RN, [facility]

## 2022-06-20 ASSESSMENT — ANXIETY QUESTIONNAIRES
7. FEELING AFRAID AS IF SOMETHING AWFUL MIGHT HAPPEN: SEVERAL DAYS
5. BEING SO RESTLESS THAT IT IS HARD TO SIT STILL: SEVERAL DAYS
3. WORRYING TOO MUCH ABOUT DIFFERENT THINGS: NOT AT ALL
2. NOT BEING ABLE TO STOP OR CONTROL WORRYING: SEVERAL DAYS
IF YOU CHECKED OFF ANY PROBLEMS ON THIS QUESTIONNAIRE, HOW DIFFICULT HAVE THESE PROBLEMS MADE IT FOR YOU TO DO YOUR WORK, TAKE CARE OF THINGS AT HOME, OR GET ALONG WITH OTHER PEOPLE: NOT DIFFICULT AT ALL
GAD7 TOTAL SCORE: 5
6. BECOMING EASILY ANNOYED OR IRRITABLE: SEVERAL DAYS
1. FEELING NERVOUS, ANXIOUS, OR ON EDGE: SEVERAL DAYS
GAD7 TOTAL SCORE: 5

## 2022-06-20 ASSESSMENT — PATIENT HEALTH QUESTIONNAIRE - PHQ9
5. POOR APPETITE OR OVEREATING: NOT AT ALL
SUM OF ALL RESPONSES TO PHQ QUESTIONS 1-9: 6

## 2022-06-21 NOTE — TELEPHONE ENCOUNTER
No Hebrew  available at this time.    Called the patients daughter, [employer] and left message in English with phone number to call back.     After 3 attempt to call with no call back, routing to provider to inquire if letter should be sent.    Shaina Iverson RN, [facility]

## 2022-06-22 NOTE — TELEPHONE ENCOUNTER
Patient's daughter returned call. She states patient is taking all diabetes medications as prescribed. Writer reviewed medication names with daughter and she confirmed this. Writer asked about diet changes, daughter states patient has been eating more desserts lately.     To provider to review and advise.  Isela Valero RN  [facility]

## 2022-06-22 NOTE — TELEPHONE ENCOUNTER
Okay, I do not think we have to make any particular adjustments but she can just watch her diet a little bit better.

## 2022-07-25 ENCOUNTER — TELEPHONE (OUTPATIENT)
Dept: FAMILY MEDICINE | Facility: CLINIC | Age: 77
End: 2022-07-25

## 2022-07-25 DIAGNOSIS — E11.42 DIABETIC POLYNEUROPATHY ASSOCIATED WITH TYPE 2 DIABETES MELLITUS (H): ICD-10-CM

## 2022-07-25 RX ORDER — HYDROCODONE BITARTRATE AND ACETAMINOPHEN 5; 325 MG/1; MG/1
1 TABLET ORAL 3 TIMES DAILY PRN
Qty: 50 TABLET | Refills: 0 | Status: SHIPPED | OUTPATIENT
Start: 2022-07-25 | End: 2022-08-23

## 2022-07-25 NOTE — TELEPHONE ENCOUNTER
Patient's daughter is calling for a refill on HYDROcodone-acetaminophen (NORCO) 5-325 MG tablet.Shakira Valle

## 2022-07-25 NOTE — TELEPHONE ENCOUNTER
Refilled x 1.  Needs visit (OFV or video visit) before any further refill.   Pain follow up in Aug

## 2022-08-23 ENCOUNTER — PATIENT OUTREACH (OUTPATIENT)
Dept: GERIATRIC MEDICINE | Facility: CLINIC | Age: 77
End: 2022-08-23

## 2022-08-23 DIAGNOSIS — E11.42 DIABETIC POLYNEUROPATHY ASSOCIATED WITH TYPE 2 DIABETES MELLITUS (H): ICD-10-CM

## 2022-08-23 RX ORDER — HYDROCODONE BITARTRATE AND ACETAMINOPHEN 5; 325 MG/1; MG/1
1 TABLET ORAL 3 TIMES DAILY PRN
Qty: 50 TABLET | Refills: 0 | Status: SHIPPED | OUTPATIENT
Start: 2022-08-23 | End: 2022-10-13

## 2022-08-23 NOTE — PROGRESS NOTES
CC updated program tasks and targets for [employer]  launch.       Regine Stapleton RN, N  [facility]  [phone]

## 2022-09-01 ENCOUNTER — PATIENT OUTREACH (OUTPATIENT)
Dept: GERIATRIC MEDICINE | Facility: CLINIC | Age: 77
End: 2022-09-01

## 2022-09-01 NOTE — PROGRESS NOTES
[facility] Care Coordination Contact      [facility] Six-Month Telephone Assessment    6 month telephone assessment completed on 9/1/22.    ER visits: No  Hospitalizations: No  TCU stays: No  Significant health status changes: none reported  Falls/Injuries: No  ADL/IADL changes: No  Changes in services: No    Caregiver Assessment follow up:  N/A    Goals: See POC in chart for goal progress documentation.      Will see member in 6 months for an annual health risk assessment.   Encouraged member to call CC with any questions or concerns in the meantime.     Regine Stapleton RN, PHN  [facility]  [phone]

## 2022-10-25 DIAGNOSIS — E11.42 TYPE 2 DIABETES MELLITUS WITH DIABETIC POLYNEUROPATHY, WITHOUT LONG-TERM CURRENT USE OF INSULIN (H): ICD-10-CM

## 2022-10-25 RX ORDER — PIOGLITAZONEHYDROCHLORIDE 45 MG/1
45 TABLET ORAL DAILY
Qty: 30 TABLET | Refills: 0 | Status: SHIPPED | OUTPATIENT
Start: 2022-10-25 | End: 2022-10-31

## 2022-10-31 ENCOUNTER — VIRTUAL VISIT (OUTPATIENT)
Dept: FAMILY MEDICINE | Facility: CLINIC | Age: 77
End: 2022-10-31
Payer: COMMERCIAL

## 2022-10-31 DIAGNOSIS — E78.5 HYPERLIPIDEMIA LDL GOAL <70: ICD-10-CM

## 2022-10-31 DIAGNOSIS — I10 ESSENTIAL HYPERTENSION WITH GOAL BLOOD PRESSURE LESS THAN 140/90: ICD-10-CM

## 2022-10-31 DIAGNOSIS — J45.20 MILD INTERMITTENT ASTHMA WITHOUT COMPLICATION: ICD-10-CM

## 2022-10-31 DIAGNOSIS — E11.42 DIABETIC POLYNEUROPATHY ASSOCIATED WITH TYPE 2 DIABETES MELLITUS (H): ICD-10-CM

## 2022-10-31 DIAGNOSIS — E11.42 TYPE 2 DIABETES MELLITUS WITH DIABETIC POLYNEUROPATHY, WITHOUT LONG-TERM CURRENT USE OF INSULIN (H): Primary | ICD-10-CM

## 2022-10-31 DIAGNOSIS — G89.4 CHRONIC PAIN SYNDROME: ICD-10-CM

## 2022-10-31 DIAGNOSIS — K21.9 GASTROESOPHAGEAL REFLUX DISEASE WITHOUT ESOPHAGITIS: ICD-10-CM

## 2022-10-31 PROCEDURE — 99214 OFFICE O/P EST MOD 30 MIN: CPT | Mod: 95 | Performed by: FAMILY MEDICINE

## 2022-10-31 RX ORDER — PIOGLITAZONEHYDROCHLORIDE 45 MG/1
45 TABLET ORAL DAILY
Qty: 90 TABLET | Refills: 1 | Status: SHIPPED | OUTPATIENT
Start: 2022-10-31 | End: 2023-06-05

## 2022-10-31 RX ORDER — AMLODIPINE BESYLATE 10 MG/1
10 TABLET ORAL DAILY
Qty: 90 TABLET | Refills: 1 | Status: SHIPPED | OUTPATIENT
Start: 2022-10-31 | End: 2023-05-01

## 2022-10-31 RX ORDER — HYDROCODONE BITARTRATE AND ACETAMINOPHEN 5; 325 MG/1; MG/1
1 TABLET ORAL 3 TIMES DAILY PRN
Qty: 50 TABLET | Refills: 0 | Status: SHIPPED | OUTPATIENT
Start: 2022-10-31 | End: 2022-12-12

## 2022-10-31 RX ORDER — ALBUTEROL SULFATE 90 UG/1
2 AEROSOL, METERED RESPIRATORY (INHALATION) EVERY 4 HOURS PRN
Qty: 8.5 G | Refills: 4 | Status: SHIPPED | OUTPATIENT
Start: 2022-10-31 | End: 2023-02-15

## 2022-10-31 RX ORDER — SIMVASTATIN 20 MG
20 TABLET ORAL DAILY
Qty: 90 TABLET | Refills: 1 | Status: SHIPPED | OUTPATIENT
Start: 2022-10-31 | End: 2023-05-01

## 2022-10-31 RX ORDER — PREGABALIN 100 MG/1
100 CAPSULE ORAL 2 TIMES DAILY
Qty: 180 CAPSULE | Refills: 1 | Status: SHIPPED | OUTPATIENT
Start: 2022-10-31 | End: 2023-06-13

## 2022-10-31 RX ORDER — GLIMEPIRIDE 4 MG/1
4 TABLET ORAL 2 TIMES DAILY
Qty: 180 TABLET | Refills: 1 | Status: SHIPPED | OUTPATIENT
Start: 2022-10-31 | End: 2023-05-01

## 2022-10-31 NOTE — PROGRESS NOTES
Enrique is a 77 year old who is being evaluated via a billable video visit.      How would you like to obtain your AVS? Mail a copy  If the video visit is dropped, the invitation should be resent by: Text to cell phone: [phone]  Will anyone else be joining your video visit? Yes: daughter. How would they like to receive their invitation? Text to cell phone: [phone]          Assessment & Plan     Type 2 diabetes mellitus with diabetic polyneuropathy, without long-term current use of insulin (H)  A1c still runs a little high despite maximal therapy.  It tends to go up and down depending on lifestyle changes.  Patient does not feel like really making much in the way of medication changes at her age so we will continue to follow.  Plan to recheck in 3 to 4 months  - glimepiride (AMARYL) 4 MG tablet; Take 1 tablet (4 mg) by mouth 2 times daily  - metFORMIN (GLUCOPHAGE) 500 MG tablet; Take 2 tablets (1,000 mg) by mouth 2 times daily (with meals)  - pioglitazone (ACTOS) 45 MG tablet; Take 1 tablet (45 mg) by mouth daily  - sitagliptin (JANUVIA) 50 MG tablet; Take 1 tablet (50 mg) by mouth daily  - Hemoglobin A1c; Future  - Lipid panel reflex to direct LDL Fasting; Future  - Comprehensive metabolic panel; Future  - Albumin Random Urine Quantitative with Creat Ratio; Future    Essential hypertension with goal blood pressure less than 140/90  Stable on current regimen.  Continue same plan and routine follow-up.   - amLODIPine (NORVASC) 10 MG tablet; Take 1 tablet (10 mg) by mouth daily    Hyperlipidemia LDL goal <70  Recheck and adjust as needed with next lab work  - simvastatin (ZOCOR) 20 MG tablet; Take 1 tablet (20 mg) by mouth daily    Diabetic polyneuropathy associated with type 2 diabetes mellitus  Stable on current regimen.  Continue same plan and routine follow-up.   - HYDROcodone-acetaminophen (NORCO) 5-325 MG tablet; Take 1 tablet by mouth 3 times daily as needed for severe pain  - pregabalin (LYRICA) 100  "MG capsule; Take 1 capsule (100 mg) by mouth 2 times daily    Mild intermittent asthma without complication  Stable on current regimen.  Continue same plan and routine follow-up.   - albuterol (PROAIR HFA/PROVENTIL HFA/VENTOLIN HFA) 108 (90 Base) MCG/ACT inhaler; Inhale 2 puffs into the lungs every 4 hours as needed for wheezing    Gastroesophageal reflux disease without esophagitis  Stable on current regimen.  Continue same plan and routine follow-up.   - omeprazole (PRILOSEC) 20 MG DR capsule; Take 1 capsule (20 mg) by mouth daily    Chronic pain syndrome  Urine drug screen with next lab work.  Continue to monitor.  - Drug Abuse Screen Panel 13, Urine (Pain Care Package) - lab collect; Future         BMI:   Estimated body mass index is 30.73 kg/m  as calculated from the following:    Height as of 11/30/21: 1.448 m (4' 9\").    Weight as of 11/30/21: 64.4 kg (142 lb).       See Patient Instructions    Return for Follow up on Pain, In Office or Video.    Lana Peguero MD  [facility] [place]    Subjective   Somkhith is a 77 year old accompanied by her daughter, presenting for the following health issues:  No chief complaint on file.      HPI     Visit with patient and daughter today in follow-up of ongoing chronic issues.  Actually feeling pretty good with no new complaints.    Review of Systems   Constitutional, HEENT, cardiovascular, pulmonary, gi and gu systems are negative, except as otherwise noted.      Objective           Vitals:  No vitals were obtained today due to virtual visit.    Physical Exam   GENERAL: Healthy, alert and no distress  EYES: Eyes grossly normal to inspection.  No discharge or erythema, or obvious scleral/conjunctival abnormalities.  RESP: No audible wheeze, cough, or visible cyanosis.  No visible retractions or increased work of breathing.    SKIN: Visible skin clear. No significant rash, abnormal pigmentation or lesions.  NEURO: Cranial nerves grossly intact.  " Mentation and speech appropriate for age.  PSYCH: Mentation appears normal, affect normal/bright, judgement and insight intact, normal speech and appearance well-groomed.    Past labs reviewed with the patient.             Video-Visit Details    Video Start Time: 1033    Type of service:  Video Visit    Video End Time:1042    Originating Location (pt. Location): Home        Distant Location (provider location):  Off-site    Platform [facility] for Video Visit: [facility]

## 2022-11-17 DIAGNOSIS — H91.90 HEARING LOSS, UNSPECIFIED HEARING LOSS TYPE, UNSPECIFIED LATERALITY: Primary | ICD-10-CM

## 2022-11-22 ENCOUNTER — OFFICE VISIT (OUTPATIENT)
Dept: AUDIOLOGY | Facility: CLINIC | Age: 77
End: 2022-11-22
Payer: COMMERCIAL

## 2022-11-22 ENCOUNTER — TELEPHONE (OUTPATIENT)
Dept: FAMILY MEDICINE | Facility: CLINIC | Age: 77
End: 2022-11-22

## 2022-11-22 DIAGNOSIS — H90.3 SENSORINEURAL HEARING LOSS, BILATERAL: Primary | ICD-10-CM

## 2022-11-22 PROCEDURE — 92550 TYMPANOMETRY & REFLEX THRESH: CPT | Performed by: AUDIOLOGIST

## 2022-11-22 PROCEDURE — 92557 COMPREHENSIVE HEARING TEST: CPT | Performed by: AUDIOLOGIST

## 2022-11-22 NOTE — Clinical Note
Patient has been given a medical clearance form for you to sign, as required by her insurance.  Once this is signed by you and she has completed her hearing aid consultation, I will be able to order hearing aids for her. Thank you.

## 2022-11-22 NOTE — PROGRESS NOTES
AUDIOLOGY REPORT    SUBJECTIVE:  Enrique Pruitt is a 77 year old female who was seen in the Audiology Clinic at the [facility] for audiologic evaluation, referred by Lana Peguero M.D.  The patient reports a long standing hearing loss bilaterally with constant tinnitus. The patient denies  bilateral otalgia, bilateral drainage, bilateral aural fullness, family history of hearing loss and history of noise exposure.  The patient notes difficulty with communication in a variety of listening situations.  They were accompanied today by their daughter.    OBJECTIVE:    Otoscopic exam indicates ears are clear of cerumen bilaterally     Pure Tone Thresholds assessed using conventional audiometry with good  reliability from 250-8000 Hz bilaterally using insert earphones and circumaural headphones     RIGHT:  moderate sloping to severe sensorineural hearing loss    LEFT:    moderate sloping to severe sensorineural hearing loss    Tympanogram:    RIGHT: normal eardrum mobility    LEFT:   normal eardrum mobility    Reflexes (reported by stimulus ear):  Could not maintain seal.      Speech Reception Threshold:    RIGHT: 55 dB HL    LEFT:   55 dB HL    Speech Reception Thresholds are in good agreement with pure tone thresholds.    Word Recognition Score:    Could not test due to language barrier      ASSESSMENT:     ICD-10-CM    1. Sensorineural hearing loss, bilateral  H90.3       2. Hearing loss, unspecified hearing loss type, unspecified laterality  H91.90 Adult Audiology  Referral          Today s results were discussed with the patient in detail.     PLAN:  Patient was counseled regarding hearing loss and impact on communication.  Patient is a good candidate for amplification at this time.Handout on good communication strategies, and hearing aid use was given to patient. It is recommended that the patient seek medical clearance from her PCP and then schedule a hearing aid  consultation appointment.  Please call this clinic with questions regarding these results or recommendations.          David Hill MA, CCC-A  [place] [facility][id number]  11/22/2022

## 2022-11-22 NOTE — TELEPHONE ENCOUNTER
Forms/Letter Request    Type of form/letter: other    Have you been seen for this request: No    Do we have the form/letter: Yes:     When is form/letter needed by: by 11/29    How would you like the form/letter returned:     Patient Notified form requests are processed in 3-5 business days:Yes    Okay to leave a detailed message?: Yes at Cell number on file:    Telephone Information:   Mobile [phone]

## 2022-11-23 NOTE — TELEPHONE ENCOUNTER
Forms signed by provider, called patient to  forms   Forms left at      Elissa DRAKE on 11/23/2022 at 8:47 AM

## 2022-12-12 DIAGNOSIS — E11.42 TYPE 2 DIABETES MELLITUS WITH DIABETIC POLYNEUROPATHY, WITHOUT LONG-TERM CURRENT USE OF INSULIN (H): ICD-10-CM

## 2022-12-12 DIAGNOSIS — E11.42 DIABETIC POLYNEUROPATHY ASSOCIATED WITH TYPE 2 DIABETES MELLITUS (H): ICD-10-CM

## 2022-12-12 RX ORDER — HYDROCODONE BITARTRATE AND ACETAMINOPHEN 5; 325 MG/1; MG/1
1 TABLET ORAL 3 TIMES DAILY PRN
Qty: 50 TABLET | Refills: 0 | Status: SHIPPED | OUTPATIENT
Start: 2022-12-12 | End: 2023-01-16

## 2022-12-12 RX ORDER — SITAGLIPTIN 50 MG/1
TABLET, FILM COATED ORAL
Qty: 90 TABLET | Refills: 1 | OUTPATIENT
Start: 2022-12-12

## 2022-12-12 RX ORDER — SITAGLIPTIN 50 MG/1
TABLET, FILM COATED ORAL
Qty: 90 TABLET | Refills: 1 | Status: SHIPPED | OUTPATIENT
Start: 2022-12-12 | End: 2023-06-13

## 2022-12-12 NOTE — TELEPHONE ENCOUNTER
Patient's daughter is calling to the clinic requesting a refill of prescription below. CTC on file with daughter.     Routing to provider to review and advise.     Jenny Nichole RN, BSN  [facility]

## 2022-12-15 ENCOUNTER — OFFICE VISIT (OUTPATIENT)
Dept: OPTOMETRY | Facility: CLINIC | Age: 77
End: 2022-12-15
Payer: COMMERCIAL

## 2022-12-15 DIAGNOSIS — E11.42 TYPE 2 DIABETES MELLITUS WITH DIABETIC POLYNEUROPATHY, WITHOUT LONG-TERM CURRENT USE OF INSULIN (H): Primary | ICD-10-CM

## 2022-12-15 DIAGNOSIS — Z96.1 PSEUDOPHAKIA: ICD-10-CM

## 2022-12-15 DIAGNOSIS — H52.203 ASTIGMATISM OF BOTH EYES, UNSPECIFIED TYPE: ICD-10-CM

## 2022-12-15 PROCEDURE — 92014 COMPRE OPH EXAM EST PT 1/>: CPT | Performed by: OPTOMETRIST

## 2022-12-15 PROCEDURE — 92015 DETERMINE REFRACTIVE STATE: CPT | Performed by: OPTOMETRIST

## 2022-12-15 ASSESSMENT — VISUAL ACUITY
METHOD: SNELLEN - LINEAR
OD_CC: 20/400
OS_PH_CC: 20/200
CORRECTION_TYPE: GLASSES
OD_CC: 20/125
OD_PH_CC: 20/125
OS_CC: 20/80
OS_CC: 20/70

## 2022-12-15 ASSESSMENT — REFRACTION_MANIFEST
OD_ADD: +3.00
OD_AXIS: 022
OS_AXIS: 170
OS_CYLINDER: +2.50
OD_CYLINDER: +1.50
OD_CYLINDER: +1.50
OS_ADD: +3.00
OS_SPHERE: -1.50
METHOD_AUTOREFRACTION: 1
OD_SPHERE: -1.25
OD_AXIS: 003
OD_SPHERE: -0.75

## 2022-12-15 ASSESSMENT — CONF VISUAL FIELD
OD_NORMAL: 1
OS_NORMAL: 1
OD_INFERIOR_NASAL_RESTRICTION: 0
OD_SUPERIOR_TEMPORAL_RESTRICTION: 0
OS_SUPERIOR_NASAL_RESTRICTION: 0
OS_SUPERIOR_TEMPORAL_RESTRICTION: 0
OS_INFERIOR_TEMPORAL_RESTRICTION: 0
OD_SUPERIOR_NASAL_RESTRICTION: 0
OS_INFERIOR_NASAL_RESTRICTION: 0
OD_INFERIOR_TEMPORAL_RESTRICTION: 0

## 2022-12-15 ASSESSMENT — EXTERNAL EXAM - LEFT EYE: OS_EXAM: NORMAL

## 2022-12-15 ASSESSMENT — TONOMETRY
OS_IOP_MMHG: 16
OD_IOP_MMHG: 16
IOP_METHOD: APPLANATION

## 2022-12-15 ASSESSMENT — REFRACTION_WEARINGRX
OS_AXIS: 170
OD_AXIS: 003
OD_SPHERE: +0.25
OS_ADD: +3.00
SPECS_TYPE: BIFOCAL
OD_CYLINDER: +1.50
OD_ADD: +3.00
OS_SPHERE: -1.00
OS_CYLINDER: +2.50

## 2022-12-15 ASSESSMENT — CUP TO DISC RATIO
OD_RATIO: 0.45
OS_RATIO: 0.45

## 2022-12-15 ASSESSMENT — EXTERNAL EXAM - RIGHT EYE: OD_EXAM: NORMAL

## 2022-12-15 NOTE — LETTER
12/15/2022         RE: Enrique Pruitt  [address]  [place] [address]        Dear Colleague,    Thank you for referring your patient, Enrique Pruitt, to the [facility]. Please see a copy of my visit note below.    Chief Complaint   Patient presents with     Diabetic Eye Exam     Accompanied by daughter  Chief Complaint(s) and History of Present Illness(es)     Diabetic Eye Exam            Diabetes Type: Type 2 and taking oral medications    Duration: 10    Blood Sugars: is controlled               Lab Results   Component Value Date    A1C 8.8 06/09/2022    A1C 8.2 11/30/2021    A1C 7.1 03/19/2021    A1C 7.1 10/06/2020    A1C 9.6 02/11/2020    A1C 7.1 09/23/2019    A1C 9.4 05/17/2019            Last Eye Exam: 7/21/2020  Dilated Previously: Yes    What are you currently using to see?  glasses    Distance Vision Acuity: Noticed gradual change in both eyes    Near Vision Acuity: Not satisfied     Eye Comfort: dry, itchy and teary  Do you use eye drops? : No  Occupation or Hobbies: RETIRED    [employer] - Optometric Assistant     Medical, surgical and family histories reviewed and updated 12/15/2022.       OBJECTIVE: See Ophthalmology exam    ASSESSMENT:    ICD-10-CM    1. Type 2 diabetes mellitus with diabetic polyneuropathy, without long-term current use of insulin (H)  E11.42 REFRACTION     EYE EXAM (SIMPLE-NONBILLABLE)     Vitreoretinal Surgery Referral      2. Pseudophakia  Z96.1 REFRACTION     EYE EXAM (SIMPLE-NONBILLABLE)     Vitreoretinal Surgery Referral      3. Astigmatism of both eyes, unspecified type  H52.203 REFRACTION     EYE EXAM (SIMPLE-NONBILLABLE)     Vitreoretinal Surgery Referral          PLAN:    Enrique Pruitt aware  eye exam results will be sent to Lana Peguero.  Patient Instructions   A RETINAL CONSULT IS RECOMMENDED TO TREAT AND MANAGE THE DIABETIC RETINOPATHY.    Patient Education  Diabetes weakens the blood vessels all over  the body, including the eyes. Damage to the blood vessels in the eyes can cause swelling or bleeding into part of the eye (called the retina). This is called diabetic retinopathy (ELLA-tin-AH-puh-thee). If not treated, this disease can cause vision loss or blindness.   Symptoms may include blurred or distorted vision, but many people have no symptoms. It's important to see your eye doctor regularly to check for problems.   Early treatment and good control can help protect your vision. Here are the things you can do to help prevent vision loss:      1. Keep your blood sugar levels under tight control.      2. Bring high blood pressure under control.      3. No smoking.      4. Have yearly dilated eye exams.       Astigmatism results from curvature differential in the cornea and crystalline lens which can cause a distorted image, as light rays are prevented from meeting at a common focus.      Eyeglass prescription given.    The affects of the dilating drops last for 4- 6 hours.  You will be more sensitive to light and vision will be blurry up close.  Do not drive if you do not feel comfortable.  Mydriatic sunglasses were given if needed.      Recommend annual eye exams.    Ashlyn Justice O.D.  [facility]   [address] [place] [address]    [phone]              Again, thank you for allowing me to participate in the care of your patient.        Sincerely,        Ashlyn Justice OD

## 2022-12-15 NOTE — PROGRESS NOTES
Chief Complaint   Patient presents with     Diabetic Eye Exam     Accompanied by daughter  Chief Complaint(s) and History of Present Illness(es)     Diabetic Eye Exam            Diabetes Type: Type 2 and taking oral medications    Duration: 10    Blood Sugars: is controlled               Lab Results   Component Value Date    A1C 8.8 06/09/2022    A1C 8.2 11/30/2021    A1C 7.1 03/19/2021    A1C 7.1 10/06/2020    A1C 9.6 02/11/2020    A1C 7.1 09/23/2019    A1C 9.4 05/17/2019            Last Eye Exam: 7/21/2020  Dilated Previously: Yes    What are you currently using to see?  glasses    Distance Vision Acuity: Noticed gradual change in both eyes    Near Vision Acuity: Not satisfied     Eye Comfort: dry, itchy and teary  Do you use eye drops? : No  Occupation or Hobbies: RETIRED    [employer] - Optometric Assistant     Medical, surgical and family histories reviewed and updated 12/15/2022.       OBJECTIVE: See Ophthalmology exam    ASSESSMENT:    ICD-10-CM    1. Type 2 diabetes mellitus with diabetic polyneuropathy, without long-term current use of insulin (H)  E11.42 REFRACTION     EYE EXAM (SIMPLE-NONBILLABLE)     Vitreoretinal Surgery Referral      2. Pseudophakia  Z96.1 REFRACTION     EYE EXAM (SIMPLE-NONBILLABLE)     Vitreoretinal Surgery Referral      3. Astigmatism of both eyes, unspecified type  H52.203 REFRACTION     EYE EXAM (SIMPLE-NONBILLABLE)     Vitreoretinal Surgery Referral          PLAN:    Enrique Pruitt aware  eye exam results will be sent to Lana Peguero.  Patient Instructions   A RETINAL CONSULT IS RECOMMENDED TO TREAT AND MANAGE THE DIABETIC RETINOPATHY.    Patient Education  Diabetes weakens the blood vessels all over the body, including the eyes. Damage to the blood vessels in the eyes can cause swelling or bleeding into part of the eye (called the retina). This is called diabetic retinopathy (ELLA-tin-AH-puh-thee). If not treated, this disease can cause vision loss or  blindness.   Symptoms may include blurred or distorted vision, but many people have no symptoms. It's important to see your eye doctor regularly to check for problems.   Early treatment and good control can help protect your vision. Here are the things you can do to help prevent vision loss:      1. Keep your blood sugar levels under tight control.      2. Bring high blood pressure under control.      3. No smoking.      4. Have yearly dilated eye exams.       Astigmatism results from curvature differential in the cornea and crystalline lens which can cause a distorted image, as light rays are prevented from meeting at a common focus.      Eyeglass prescription given.    The affects of the dilating drops last for 4- 6 hours.  You will be more sensitive to light and vision will be blurry up close.  Do not drive if you do not feel comfortable.  Mydriatic sunglasses were given if needed.      Recommend annual eye exams.    Ashlyn Justice O.D.  [facility]   [address] [place] [address]    [phone]

## 2022-12-15 NOTE — PATIENT INSTRUCTIONS
A RETINAL CONSULT IS RECOMMENDED TO TREAT AND MANAGE THE DIABETIC RETINOPATHY.    Patient Education   Diabetes weakens the blood vessels all over the body, including the eyes. Damage to the blood vessels in the eyes can cause swelling or bleeding into part of the eye (called the retina). This is called diabetic retinopathy (ELLA-tin-AH-puh-thee). If not treated, this disease can cause vision loss or blindness.   Symptoms may include blurred or distorted vision, but many people have no symptoms. It's important to see your eye doctor regularly to check for problems.   Early treatment and good control can help protect your vision. Here are the things you can do to help prevent vision loss:      1. Keep your blood sugar levels under tight control.      2. Bring high blood pressure under control.      3. No smoking.      4. Have yearly dilated eye exams.       Astigmatism results from curvature differential in the cornea and crystalline lens which can cause a distorted image, as light rays are prevented from meeting at a common focus.      Eyeglass prescription given.    The affects of the dilating drops last for 4- 6 hours.  You will be more sensitive to light and vision will be blurry up close.  Do not drive if you do not feel comfortable.  Mydriatic sunglasses were given if needed.      Recommend annual eye exams.    Ashlyn Justice O.D.  [address] [address] [phone]

## 2022-12-20 ENCOUNTER — DOCUMENTATION ONLY (OUTPATIENT)
Dept: AUDIOLOGY | Facility: CLINIC | Age: 77
End: 2022-12-20

## 2022-12-20 ENCOUNTER — OFFICE VISIT (OUTPATIENT)
Dept: AUDIOLOGY | Facility: CLINIC | Age: 77
End: 2022-12-20
Payer: COMMERCIAL

## 2022-12-20 DIAGNOSIS — H90.3 SENSORINEURAL HEARING LOSS, BILATERAL: Primary | ICD-10-CM

## 2022-12-20 PROCEDURE — V5275 EAR IMPRESSION: HCPCS | Mod: RT | Performed by: AUDIOLOGIST

## 2022-12-20 PROCEDURE — 92591 PR HEARING AID EXAM BINAURAL: CPT | Performed by: AUDIOLOGIST

## 2022-12-20 NOTE — PROGRESS NOTES
Called and spoke with [facility] representative Pattie about hearing aid benefits. Stated that hearing aids and batteries are covered, as well as repair and replacement due to normal wear and tear. 100% coverage, no copay, maximun deductible or prior authorizations required if the provider is in network.     CPT code v5261 has not been billed in the last five years.     Reference #: [id number]    Sammi Burgess Audiology Clinic Assistant

## 2022-12-20 NOTE — PROGRESS NOTES
AUDIOLOGY REPORT    SUBJECTIVE: Enrique Pruitt is a 77 year old female was seen in the Audiology Clinic at  [facility] on 12/20/22 to discuss concerns with hearing and functional communication difficulties. The patient was accompanied by their daughter. Enrique has been seen previously on 11/22/22, and results revealed a bilateral sensorineural hearing loss.  The patient was medically evaluated and determined to be cleared for binaural hearing aids by ADAM Peguero MD. Enrique notes difficulty with communication in a variety of listening situations.    OBJECTIVE:    Patient is a hearing aid candidate. Patient would like to move forward with a hearing aid evaluation today. Therefore, the patient was presented with different options for amplification to help aid in communication. Discussed styles, levels of technology and monaural vs. binaural fitting.     The hearing aid(s) mutually chosen were:  Binaural: Phonak Audeo P70R  COLOR: P1  BATTERY SIZE: rechargeable  EARMOLD/TIPS: canal C shells  CANAL/ LENGTH: 1    Otoscopy revealed ears are clear of cerumen bilaterally. Bilateral earmolds were taken without incident.    ASSESSMENT:     ICD-10-CM    1. Sensorineural hearing loss, bilateral  H90.3           Reviewed purchase information and warranty information with patient. The 45 day trial period was explained to patient. The patient was given a copy of the [place] Department of Health consumer brochure on purchasing hearing instruments. Patient risk factors have been provided to the patient in writing prior to the sale of the hearing aid per FDA regulation. The risk factors are also available in the User Instructional Booklet to be presented on the day of the hearing aid fitting. Hearing aid(s) ordered. Hearing aid evaluation completed.    PLAN: Enrique is scheduled to return in 4-5 weeks for a hearing aid fitting and programming. Purchase agreement will be completed  on that date. Please contact this clinic with any questions or concerns.      David Hill MA, CCC-A  [place] Licensed Audiologist #[id number]  [facility]

## 2022-12-29 ENCOUNTER — PATIENT OUTREACH (OUTPATIENT)
Dept: GERIATRIC MEDICINE | Facility: CLINIC | Age: 77
End: 2022-12-29

## 2022-12-29 NOTE — PROGRESS NOTES
Encounter opened due to Regulatory Compass  Update to open FVP Program.    Mirna Justice  Care Management Specialist  [facility]  [phone]

## 2022-12-29 NOTE — PROGRESS NOTES
Encounter opened due to Regulatory Compass  Update to close FVP Program.    Mirna Justice  Care Management Specialist  [facility]  [phone]

## 2022-12-29 NOTE — PROGRESS NOTES
[facility] Care Coordination Contact    Called adult daughter Pilo to schedule annual HRA home visit. HRA has been scheduled for 1/4/23 at 11:30am.       Regine Stapleton RN, PHN  [facility]  [phone]

## 2023-01-04 ENCOUNTER — PATIENT OUTREACH (OUTPATIENT)
Dept: GERIATRIC MEDICINE | Facility: CLINIC | Age: 78
End: 2023-01-04

## 2023-01-04 NOTE — PROGRESS NOTES
Annual reassessment rescheduled to 1/5/23 at 11:30am due to the weather.       Regine Stapleton RN, N  [facility]  [phone]

## 2023-01-05 ENCOUNTER — PATIENT OUTREACH (OUTPATIENT)
Dept: GERIATRIC MEDICINE | Facility: CLINIC | Age: 78
End: 2023-01-05

## 2023-01-05 NOTE — PROGRESS NOTES
[facility] Care Coordination Contact    [facility] Home Visit Assessment     Home visit for Health Risk Assessment with Enrique Pruitt completed on January 5, 2023                  Current Care Plan  Member currently receiving the following home care services:     Member currently receiving the following community resources:    NA    Medication Review  Medication reconciliation completed in Epic: Yes  Medication set-up & administration: Family/informal caregiver sets up weekly.  Family caregiver administers medications.  Medication Risk Assessment Medication (1 or more, place referral to MTM): N/A: No risk factors identified  MTM Referral Placed: No: No risk factors idenified         Falls Assessment: No falls reported within the past year.            ADL/IADL Dependencies: Dependent with most IADLS/ADLS.           [facility] sponsored benefits: Shared information re: Silver Sneakers/gym memberships, ASA, Calcium +D.    PCA Assessment completed at visit: Yes Annual PCA assessment indicated 25 units per day of PCA. This is the same as the previous assessment.     Elderly Waiver Eligibility: Yes-will continue on EW    Care Plan & Recommendations: Annual HRA completed with member, daughter (Pilo) and [place]ian . Member states health is stable, no ED, hospitalization or falls within the past year. Enrique will resume PCA at 25 units daily and homemaker at 2 hours weekly. Enrique is dependent with most IADLS/ADLS. Family and formal caretaker will continue to assist with IADLS/ADLS daily.     See LTCC for detailed assessment information.    Follow-Up Plan: Member informed of future contact, plan to f/u with member with a 6 month telephone assessment.  Contact information shared with member and family, encouraged member to call with any questions or concerns at any time.    [facility] care continuum providers: Please see Snapshot and Care Management Flowsheets for Specific details of  care plan.    This CC note routed to PCP.    Regine Stapleton RN, N  [facility]  [phone]

## 2023-01-05 NOTE — Clinical Note
J CARLOS Baumann I completed an annual home visit with your patient, Enrique Pruitt on 1/5/2023.   Thank you,  Regine Stapleton RN, [facility] [phone]

## 2023-01-09 SDOH — ECONOMIC STABILITY: TRANSPORTATION INSECURITY
IN THE PAST 12 MONTHS, HAS THE LACK OF TRANSPORTATION KEPT YOU FROM MEDICAL APPOINTMENTS OR FROM GETTING MEDICATIONS?: NO

## 2023-01-09 SDOH — ECONOMIC STABILITY: INCOME INSECURITY: IN THE LAST 12 MONTHS, WAS THERE A TIME WHEN YOU WERE NOT ABLE TO PAY THE MORTGAGE OR RENT ON TIME?: NO

## 2023-01-09 SDOH — ECONOMIC STABILITY: TRANSPORTATION INSECURITY
IN THE PAST 12 MONTHS, HAS LACK OF TRANSPORTATION KEPT YOU FROM MEETINGS, WORK, OR FROM GETTING THINGS NEEDED FOR DAILY LIVING?: NO

## 2023-01-09 ASSESSMENT — LIFESTYLE VARIABLES
HOW OFTEN DO YOU HAVE SIX OR MORE DRINKS ON ONE OCCASION: NEVER
SKIP TO QUESTIONS 9-10: 1
HOW OFTEN DO YOU HAVE A DRINK CONTAINING ALCOHOL: NEVER
AUDIT-C TOTAL SCORE: 0
HOW MANY STANDARD DRINKS CONTAINING ALCOHOL DO YOU HAVE ON A TYPICAL DAY: PATIENT DOES NOT DRINK

## 2023-01-10 ENCOUNTER — OFFICE VISIT (OUTPATIENT)
Dept: AUDIOLOGY | Facility: CLINIC | Age: 78
End: 2023-01-10
Payer: COMMERCIAL

## 2023-01-10 DIAGNOSIS — H90.3 SENSORINEURAL HEARING LOSS, BILATERAL: Primary | ICD-10-CM

## 2023-01-10 PROCEDURE — V5160 DISPENSING FEE BINAURAL: HCPCS | Performed by: AUDIOLOGIST

## 2023-01-10 PROCEDURE — V5020 CONFORMITY EVALUATION: HCPCS | Mod: LT | Performed by: AUDIOLOGIST

## 2023-01-10 PROCEDURE — 92593 PR HEARING AID CHECK, BINAURAL: CPT | Performed by: AUDIOLOGIST

## 2023-01-10 PROCEDURE — V5011 HEARING AID FITTING/CHECKING: HCPCS | Mod: LT | Performed by: AUDIOLOGIST

## 2023-01-10 PROCEDURE — V5020 CONFORMITY EVALUATION: HCPCS | Mod: RT | Performed by: AUDIOLOGIST

## 2023-01-10 PROCEDURE — V5261 HEARING AID, DIGIT, BIN, BTE: HCPCS | Mod: NU | Performed by: AUDIOLOGIST

## 2023-01-10 PROCEDURE — V5011 HEARING AID FITTING/CHECKING: HCPCS | Mod: RT | Performed by: AUDIOLOGIST

## 2023-01-10 PROCEDURE — V5264 EAR MOLD/INSERT: HCPCS | Mod: NU | Performed by: AUDIOLOGIST

## 2023-01-10 NOTE — PATIENT INSTRUCTIONS

## 2023-01-10 NOTE — PROGRESS NOTES
AUDIOLOGY REPORT    SUBJECTIVE: Enrique Pruitt, a 77 year old female, was seen in the Audiology Clinic at [facility] today for a Binaural hearing aid fitting. Previous results have revealed a bilateral  sensorineural hearing loss. The patient was given medical clearance to pursue amplification by  Titi Peguero MD..      OBJECTIVE:  Prior to fitting, a hearing aid check was performed to ensure device functionality. The hearing aid conformity evaluation was completed.The hearing aids were placed and they provided a good fit. Real-ear-probe-microphone measurements were completed on the [facility] system and were a good match to NAL-NL2 target with soft sounds audible, moderate sounds comfortable, and loud sounds below discomfort. UCLs are verified through maximum power output measures and demonstrate appropriate limiting of loud inputs. Ms. Pruitt was oriented to proper hearing aid use, care, cleaning (no water, dry brush), batteries (rechargeable, toxicity, low-battery signal), aid insertion/removal, user booklet, warranty information, storage cases, and other hearing aid details. The patient confirmed understanding of hearing aid use and care, and showed proper insertion of hearing aid and batteries while in the office today. Ms. Pruitt reported good volume and sound quality today.    EAR(S) FIT: Binaural  MA HEARING AID MAKE: Right: Phonak; Left: Phonak    MA HEARING AID MODEL #: Right: [id number]; Left: [id number]  HEARING AID STYLE: Right: ALIZE; Left: ALIZE  DOME SIZE: Right:   ; Left::      LENGTH: Right:  1M; Left:  1M  EARMOLDS: Right: C shell [id number]; Left:  C shell [id number]  SERIAL NUMBERS: Right: [id number]; Left: [id number]  WARRANTY END DATE: Right: 3/26/2026; Left:: 3/26/2026           ASSESSMENT: Binaural hearing aid fitting completed today. Verification measures were performed. The 45 day trial period was explained to patient, and they  expressed understanding. Ms. Pruitt signed the Hearing Aid Purchase Agreement and was given a copy, as well as details on her hearing aids. Patient was counseled that exact out of pocket amounts cannot be determined for hearing aid claims being sent to insurance. Any insurance coverage information presented to the patient is an estimate only, and is not a guarantee of payment. Patient has been advised to check with their own insurance.    PLAN: Ms. Pruitt will return for follow-up in 2-3 weeks for a hearing aid review appointment. Please call this clinic with questions regarding today s appointment.    David Hill MA, CCC-A  [place] Licensed Audiologist #[id number]  [facility]        1/10/2023

## 2023-01-11 ENCOUNTER — IMMUNIZATION (OUTPATIENT)
Dept: NURSING | Facility: CLINIC | Age: 78
End: 2023-01-11
Payer: COMMERCIAL

## 2023-01-11 PROCEDURE — G0008 ADMIN INFLUENZA VIRUS VAC: HCPCS

## 2023-01-11 PROCEDURE — 90662 IIV NO PRSV INCREASED AG IM: CPT

## 2023-01-11 PROCEDURE — 0124A COVID-19 VACCINE BIVALENT BOOSTER 12+ (PFIZER): CPT

## 2023-01-11 PROCEDURE — 91312 COVID-19 VACCINE BIVALENT BOOSTER 12+ (PFIZER): CPT

## 2023-01-13 ENCOUNTER — PATIENT OUTREACH (OUTPATIENT)
Dept: GERIATRIC MEDICINE | Facility: CLINIC | Age: 78
End: 2023-01-13

## 2023-01-13 NOTE — PROGRESS NOTES
[facility] Care Coordination Contact    [facility]:  Emailed completed PCA assessment to [facility].  Faxed copy of PCA assessment to PCA Agency and mailed copy to member.  Faxed MD Communication to PCP.     Mirna Justice  Care Management Specialist  [facility]  [phone]

## 2023-01-13 NOTE — LETTER
[facility]  [address], [address]  [place] [address]  Phone:  [phone]  Fax:  [phone]      January 13, 2023    ENRIQUE NEWBERRY  [address] [address]    Dear Enrique,    Evan is a copy of your completed PCA Assessment and Service Plan.  This is for your records and no action is required by you.  If you have additional questions regarding your assessment please contact me at [phone]. If you feel that your needs are not being met, please contact the Clinical Supervisor at [phone].    Sincerely,    Regine Stapleton RN, PHN    E-mail:[email or web address]  Phone: [phone]    Care Manager  [facility]            Enclosure:  Completed PCA assessment

## 2023-01-16 DIAGNOSIS — E11.42 DIABETIC POLYNEUROPATHY ASSOCIATED WITH TYPE 2 DIABETES MELLITUS (H): ICD-10-CM

## 2023-01-16 RX ORDER — HYDROCODONE BITARTRATE AND ACETAMINOPHEN 5; 325 MG/1; MG/1
1 TABLET ORAL 3 TIMES DAILY PRN
Qty: 50 TABLET | Refills: 0 | Status: SHIPPED | OUTPATIENT
Start: 2023-01-16 | End: 2023-02-20

## 2023-01-16 NOTE — TELEPHONE ENCOUNTER
Patient's daughter is calling in requesting a refill of prescription below.     CTC on file with daughter.     Daughter would like prescription refilled at [facility] in [place].     Routing to provider to review and advise.     Jenny Nichole RN, BSN  [facility]

## 2023-01-30 ENCOUNTER — PATIENT OUTREACH (OUTPATIENT)
Dept: GERIATRIC MEDICINE | Facility: CLINIC | Age: 78
End: 2023-01-30
Payer: COMMERCIAL

## 2023-01-30 NOTE — LETTER
January 30, 2023      ENRIQUE NEWBERRY  [address]  [place] [address]      Dear Enrique:    At [facility], we re dedicated to improving your health and wellness. Enclosed is the Care Plan developed with you on 1/5/2023. Please review the Care Plan carefully.    As a reminder, during your visit we talked about:    Ways to manage your physical and mental health    Using health care to maintain and improve your health     Your preventive care needs     Remember to contact your care coordinator if you:    Are hospitalized, or plan to be hospitalized     Have a fall      Have a change in your physical or mental health    Need help finding support or services    If you have questions, or don t agree with your Care Plan, call me at [phone]. You can also call me if your needs change. TTY users, call the [place] [employer] at (086) or 1-109.199.3263 (nyqbkz-ck-actral relay service).    Sincerely,    Regine Stapleton RN, PHN    E-mail:[email or web address]  Phone: [phone]    Care Manager  [facility]) is a health plan that contracts with both Medicare and the [place] Medical Assistance (Medicaid) program to provide benefits of both programs to enrollees. Enrollment in [facility] depends on contract renewal.    [id number] accepted    [id number] (07/2022)

## 2023-01-30 NOTE — PROGRESS NOTES
[facility] Care Coordination Contact    Received after visit chart from care coordinator.  Completed following tasks: Mailed copy of care plan to client, Updated services in Database, Submitted referrals/auths for Homemaking & wipes, Mailed Consent to Communicate form  and Mailed [employer] Safe Medication Disposal   , Provider Signature - No POC Shared:  Member indicates that they do not want their POC shared with any EW providers.     and Order placed with [employer] (p: [phone]; f: [phone]) for wipes.  Order placed on 1/30/2023. Database updated.  As required, authorization submitted to health plan.    Mirna Justice  Care Management Specialist  [facility]  [phone]

## 2023-02-15 DIAGNOSIS — J45.20 MILD INTERMITTENT ASTHMA WITHOUT COMPLICATION: ICD-10-CM

## 2023-02-15 RX ORDER — ALBUTEROL SULFATE 90 UG/1
2 AEROSOL, METERED RESPIRATORY (INHALATION) EVERY 4 HOURS PRN
Qty: 8.5 G | Refills: 4 | Status: SHIPPED | OUTPATIENT
Start: 2023-02-15 | End: 2023-05-11

## 2023-02-20 DIAGNOSIS — E11.42 DIABETIC POLYNEUROPATHY ASSOCIATED WITH TYPE 2 DIABETES MELLITUS (H): ICD-10-CM

## 2023-02-20 RX ORDER — HYDROCODONE BITARTRATE AND ACETAMINOPHEN 5; 325 MG/1; MG/1
1 TABLET ORAL 3 TIMES DAILY PRN
Qty: 50 TABLET | Refills: 0 | Status: SHIPPED | OUTPATIENT
Start: 2023-02-20 | End: 2023-03-21

## 2023-02-20 NOTE — TELEPHONE ENCOUNTER
Reason for Call:  Medication or medication refill:    Do you use a [facility]?  Name of the pharmacy and phone number for the current request: [id number] - [facility] [address] [place] [address] AT [facility]      Name of the medication requested: HYDROcodone-acetaminophen (NORCO) 5-325 MG tablet    Other comments: patients daughter called requesting a refill - she is aware that there will need to be a visit in order to refill this medication. They are not able to do a virtual appt.     Can we use same day for this or ok to schedule on next available     Routing to pcp to review and advise     Can we leave a detailed message on this number? YES    Phone number patient can be reached at: Home number on file [phone] (home)    Best Time: anytime     Call taken on 2/20/2023 at 3:07 PM by Elissa Stapleton

## 2023-02-22 NOTE — TELEPHONE ENCOUNTER
Next available is in may, got patient scheduled for physical at least in may   Should she come in sooner for med check? Patient declined virtual visit at this time

## 2023-03-13 ENCOUNTER — TELEPHONE (OUTPATIENT)
Dept: FAMILY MEDICINE | Facility: CLINIC | Age: 78
End: 2023-03-13
Payer: COMMERCIAL

## 2023-03-13 NOTE — TELEPHONE ENCOUNTER
Forms/Letter Request    Type of form/letter:  Form received from ling   Left on providers desk for completion    Have you been seen for this request:     Do we have the form/letter: Yes     When is form/letter needed by:     How would you like the form/letter returned:   Fax         Elissa DRAKE - Mauro Facilitator

## 2023-03-20 ENCOUNTER — TELEPHONE (OUTPATIENT)
Dept: FAMILY MEDICINE | Facility: CLINIC | Age: 78
End: 2023-03-20
Payer: COMMERCIAL

## 2023-03-20 DIAGNOSIS — E11.42 DIABETIC POLYNEUROPATHY ASSOCIATED WITH TYPE 2 DIABETES MELLITUS (H): ICD-10-CM

## 2023-03-20 NOTE — TELEPHONE ENCOUNTER
Medication Question or Refill        What medication are you calling about (include dose and sig)?: HYDROcodone-acetaminophen (NORCO) 5-325 MG tablet    Preferred Pharmacy:     [id number] - [place] [address] AT [address]  [place] [address]  Phone: [phone] Fax: [phone]      Controlled Substance Agreement on file:   CSA -- Patient Level:     [Media Unavailable] Controlled Substance Agreement - Opioid - Scan on 1/21/2022 12:19 PM   [Media Unavailable] Controlled Substance Agreement - Opioid - Scan on 12/6/2020  2:38 PM   [Media Unavailable] Controlled Substance Agreement - Opioid - Scan on 5/20/2019  6:59 AM       Who prescribed the medication?: Dr. Peguero    Do you need a refill? Yes    When did you use the medication last? 2/2023    Patient offered an appointment? Yes: 5/13/23    Do you have any questions or concerns?  No      Okay to leave a detailed message?: Yes at Cell number on file:    Telephone Information:   Mobile [phone]

## 2023-03-21 RX ORDER — HYDROCODONE BITARTRATE AND ACETAMINOPHEN 5; 325 MG/1; MG/1
1 TABLET ORAL 3 TIMES DAILY PRN
Qty: 50 TABLET | Refills: 0 | Status: SHIPPED | OUTPATIENT
Start: 2023-03-21 | End: 2023-05-22

## 2023-04-30 DIAGNOSIS — I10 ESSENTIAL HYPERTENSION WITH GOAL BLOOD PRESSURE LESS THAN 140/90: ICD-10-CM

## 2023-04-30 DIAGNOSIS — E78.5 HYPERLIPIDEMIA LDL GOAL <70: ICD-10-CM

## 2023-04-30 DIAGNOSIS — E11.42 TYPE 2 DIABETES MELLITUS WITH DIABETIC POLYNEUROPATHY, WITHOUT LONG-TERM CURRENT USE OF INSULIN (H): ICD-10-CM

## 2023-04-30 DIAGNOSIS — K21.9 GASTROESOPHAGEAL REFLUX DISEASE WITHOUT ESOPHAGITIS: ICD-10-CM

## 2023-05-01 RX ORDER — SIMVASTATIN 20 MG
TABLET ORAL
Qty: 90 TABLET | Refills: 1 | Status: SHIPPED | OUTPATIENT
Start: 2023-05-01 | End: 2023-10-23

## 2023-05-01 RX ORDER — GLIMEPIRIDE 4 MG/1
TABLET ORAL
Qty: 180 TABLET | Refills: 0 | Status: SHIPPED | OUTPATIENT
Start: 2023-05-01 | End: 2023-06-13

## 2023-05-01 RX ORDER — AMLODIPINE BESYLATE 10 MG/1
TABLET ORAL
Qty: 90 TABLET | Refills: 0 | Status: SHIPPED | OUTPATIENT
Start: 2023-05-01 | End: 2023-06-13

## 2023-05-11 DIAGNOSIS — J45.20 MILD INTERMITTENT ASTHMA WITHOUT COMPLICATION: ICD-10-CM

## 2023-05-11 RX ORDER — ALBUTEROL SULFATE 90 UG/1
2 AEROSOL, METERED RESPIRATORY (INHALATION) EVERY 4 HOURS PRN
Qty: 8.5 G | Refills: 4 | Status: SHIPPED | OUTPATIENT
Start: 2023-05-11

## 2023-05-22 DIAGNOSIS — E11.42 DIABETIC POLYNEUROPATHY ASSOCIATED WITH TYPE 2 DIABETES MELLITUS (H): ICD-10-CM

## 2023-05-22 RX ORDER — HYDROCODONE BITARTRATE AND ACETAMINOPHEN 5; 325 MG/1; MG/1
1 TABLET ORAL 3 TIMES DAILY PRN
Qty: 50 TABLET | Refills: 0 | Status: SHIPPED | OUTPATIENT
Start: 2023-05-22 | End: 2023-06-13

## 2023-05-22 NOTE — TELEPHONE ENCOUNTER
Medication Question or Refill    Patient is our of medication. Please fill asap    What medication are you calling about (include dose and sig)?: HYDROcodone-acetaminophen (NORCO) 5-325 MG tablet    Preferred Pharmacy:    [id number] - [place], [address] AT [address] [place] [address]  Phone: [phone] Fax: [phone]      Controlled Substance Agreement on file:   CSA -- Patient Level:     [Media Unavailable] Controlled Substance Agreement - Opioid - Scan on 1/21/2022 12:19 PM   [Media Unavailable] Controlled Substance Agreement - Opioid - Scan on 12/6/2020  2:38 PM   [Media Unavailable] Controlled Substance Agreement - Opioid - Scan on 5/20/2019  6:59 AM       Who prescribed the medication?: PCP    Do you need a refill? Yes

## 2023-05-22 NOTE — TELEPHONE ENCOUNTER
Routing refill request below to provider to review and advise.    NOELLE Meneses  [facility]

## 2023-06-04 DIAGNOSIS — E11.42 TYPE 2 DIABETES MELLITUS WITH DIABETIC POLYNEUROPATHY, WITHOUT LONG-TERM CURRENT USE OF INSULIN (H): ICD-10-CM

## 2023-06-05 RX ORDER — PIOGLITAZONEHYDROCHLORIDE 45 MG/1
TABLET ORAL
Qty: 90 TABLET | Refills: 1 | Status: SHIPPED | OUTPATIENT
Start: 2023-06-05 | End: 2024-01-10

## 2023-06-13 ENCOUNTER — OFFICE VISIT (OUTPATIENT)
Dept: FAMILY MEDICINE | Facility: CLINIC | Age: 78
End: 2023-06-13
Payer: COMMERCIAL

## 2023-06-13 VITALS
HEIGHT: 57 IN | RESPIRATION RATE: 16 BRPM | TEMPERATURE: 98.3 F | HEART RATE: 104 BPM | DIASTOLIC BLOOD PRESSURE: 69 MMHG | WEIGHT: 147.25 LBS | BODY MASS INDEX: 31.77 KG/M2 | OXYGEN SATURATION: 97 % | SYSTOLIC BLOOD PRESSURE: 106 MMHG

## 2023-06-13 DIAGNOSIS — E78.5 HYPERLIPIDEMIA LDL GOAL <70: ICD-10-CM

## 2023-06-13 DIAGNOSIS — G89.4 CHRONIC PAIN SYNDROME: ICD-10-CM

## 2023-06-13 DIAGNOSIS — I10 ESSENTIAL HYPERTENSION WITH GOAL BLOOD PRESSURE LESS THAN 140/90: ICD-10-CM

## 2023-06-13 DIAGNOSIS — E11.42 DIABETIC POLYNEUROPATHY ASSOCIATED WITH TYPE 2 DIABETES MELLITUS (H): ICD-10-CM

## 2023-06-13 DIAGNOSIS — E11.42 TYPE 2 DIABETES MELLITUS WITH DIABETIC POLYNEUROPATHY, WITHOUT LONG-TERM CURRENT USE OF INSULIN (H): Primary | ICD-10-CM

## 2023-06-13 DIAGNOSIS — N18.2 CKD (CHRONIC KIDNEY DISEASE) STAGE 2, GFR 60-89 ML/MIN: ICD-10-CM

## 2023-06-13 LAB — HBA1C MFR BLD: 9.4 % (ref 0–5.6)

## 2023-06-13 PROCEDURE — 99214 OFFICE O/P EST MOD 30 MIN: CPT | Performed by: FAMILY MEDICINE

## 2023-06-13 PROCEDURE — 80061 LIPID PANEL: CPT | Performed by: FAMILY MEDICINE

## 2023-06-13 PROCEDURE — 80053 COMPREHEN METABOLIC PANEL: CPT | Performed by: FAMILY MEDICINE

## 2023-06-13 PROCEDURE — 36415 COLL VENOUS BLD VENIPUNCTURE: CPT | Performed by: FAMILY MEDICINE

## 2023-06-13 PROCEDURE — 80306 DRUG TEST PRSMV INSTRMNT: CPT | Mod: 59 | Performed by: FAMILY MEDICINE

## 2023-06-13 PROCEDURE — 83036 HEMOGLOBIN GLYCOSYLATED A1C: CPT | Performed by: FAMILY MEDICINE

## 2023-06-13 PROCEDURE — 82570 ASSAY OF URINE CREATININE: CPT | Performed by: FAMILY MEDICINE

## 2023-06-13 PROCEDURE — 82043 UR ALBUMIN QUANTITATIVE: CPT | Performed by: FAMILY MEDICINE

## 2023-06-13 RX ORDER — PREGABALIN 100 MG/1
100 CAPSULE ORAL 2 TIMES DAILY
Qty: 180 CAPSULE | Refills: 1 | Status: SHIPPED | OUTPATIENT
Start: 2023-06-13 | End: 2024-01-02

## 2023-06-13 RX ORDER — AMLODIPINE BESYLATE 10 MG/1
10 TABLET ORAL DAILY
Qty: 90 TABLET | Refills: 1 | Status: SHIPPED | OUTPATIENT
Start: 2023-06-13 | End: 2024-01-19

## 2023-06-13 RX ORDER — HYDROCODONE BITARTRATE AND ACETAMINOPHEN 5; 325 MG/1; MG/1
1 TABLET ORAL 3 TIMES DAILY PRN
Qty: 50 TABLET | Refills: 0 | Status: SHIPPED | OUTPATIENT
Start: 2023-06-13 | End: 2023-07-19

## 2023-06-13 RX ORDER — GLIMEPIRIDE 4 MG/1
4 TABLET ORAL 2 TIMES DAILY
Qty: 180 TABLET | Refills: 1 | Status: SHIPPED | OUTPATIENT
Start: 2023-06-13 | End: 2024-01-10

## 2023-06-13 ASSESSMENT — ANXIETY QUESTIONNAIRES
4. TROUBLE RELAXING: NOT AT ALL
3. WORRYING TOO MUCH ABOUT DIFFERENT THINGS: NOT AT ALL
8. IF YOU CHECKED OFF ANY PROBLEMS, HOW DIFFICULT HAVE THESE MADE IT FOR YOU TO DO YOUR WORK, TAKE CARE OF THINGS AT HOME, OR GET ALONG WITH OTHER PEOPLE?: SOMEWHAT DIFFICULT
7. FEELING AFRAID AS IF SOMETHING AWFUL MIGHT HAPPEN: NOT AT ALL
2. NOT BEING ABLE TO STOP OR CONTROL WORRYING: NOT AT ALL
7. FEELING AFRAID AS IF SOMETHING AWFUL MIGHT HAPPEN: NOT AT ALL
5. BEING SO RESTLESS THAT IT IS HARD TO SIT STILL: NOT AT ALL
6. BECOMING EASILY ANNOYED OR IRRITABLE: NOT AT ALL
GAD7 TOTAL SCORE: 0
IF YOU CHECKED OFF ANY PROBLEMS ON THIS QUESTIONNAIRE, HOW DIFFICULT HAVE THESE PROBLEMS MADE IT FOR YOU TO DO YOUR WORK, TAKE CARE OF THINGS AT HOME, OR GET ALONG WITH OTHER PEOPLE: SOMEWHAT DIFFICULT
1. FEELING NERVOUS, ANXIOUS, OR ON EDGE: NOT AT ALL
GAD7 TOTAL SCORE: 0
GAD7 TOTAL SCORE: 0

## 2023-06-13 ASSESSMENT — PATIENT HEALTH QUESTIONNAIRE - PHQ9
SUM OF ALL RESPONSES TO PHQ QUESTIONS 1-9: 12
SUM OF ALL RESPONSES TO PHQ QUESTIONS 1-9: 12
10. IF YOU CHECKED OFF ANY PROBLEMS, HOW DIFFICULT HAVE THESE PROBLEMS MADE IT FOR YOU TO DO YOUR WORK, TAKE CARE OF THINGS AT HOME, OR GET ALONG WITH OTHER PEOPLE: NOT DIFFICULT AT ALL

## 2023-06-13 ASSESSMENT — ASTHMA QUESTIONNAIRES
ACT_TOTALSCORE: 24
QUESTION_3 LAST FOUR WEEKS HOW OFTEN DID YOUR ASTHMA SYMPTOMS (WHEEZING, COUGHING, SHORTNESS OF BREATH, CHEST TIGHTNESS OR PAIN) WAKE YOU UP AT NIGHT OR EARLIER THAN USUAL IN THE MORNING: NOT AT ALL
QUESTION_4 LAST FOUR WEEKS HOW OFTEN HAVE YOU USED YOUR RESCUE INHALER OR NEBULIZER MEDICATION (SUCH AS ALBUTEROL): NOT AT ALL
QUESTION_2 LAST FOUR WEEKS HOW OFTEN HAVE YOU HAD SHORTNESS OF BREATH: NOT AT ALL
ACT_TOTALSCORE: 24
QUESTION_1 LAST FOUR WEEKS HOW MUCH OF THE TIME DID YOUR ASTHMA KEEP YOU FROM GETTING AS MUCH DONE AT WORK, SCHOOL OR AT HOME: NONE OF THE TIME
QUESTION_5 LAST FOUR WEEKS HOW WOULD YOU RATE YOUR ASTHMA CONTROL: WELL CONTROLLED

## 2023-06-13 ASSESSMENT — PAIN SCALES - GENERAL: PAINLEVEL: NO PAIN (0)

## 2023-06-13 NOTE — PROGRESS NOTES
"  Assessment & Plan     Type 2 diabetes mellitus with diabetic polyneuropathy, without long-term current use of insulin (H)  Last A1c 8.8.  Difficult given her age and overall situation.  We will recheck and adjust accordingly.  - Lipid panel reflex to direct LDL Non-fasting; Future  - Comprehensive metabolic panel; Future  - Hemoglobin A1c; Future  - Albumin Random Urine Quantitative with Creat Ratio; Future  - glimepiride (AMARYL) 4 MG tablet; Take 1 tablet (4 mg) by mouth 2 times daily  - sitagliptin (JANUVIA) 50 MG tablet; Take 1 tablet (50 mg) by mouth daily    Essential hypertension with goal blood pressure less than 140/90  Stable on current regimen.  Continue same plan and routine follow-up.   - amLODIPine (NORVASC) 10 MG tablet; Take 1 tablet (10 mg) by mouth daily    Hyperlipidemia LDL goal <70  Recheck and adjust accordingly    CKD (chronic kidney disease) stage 2, GFR 60-89 ml/min  Stable and following.  Renal function previously worse has been doing much better in the past couple of years.    Chronic pain syndrome  Controlled substance agreement signed today.  Urine drug screen today  - Drug Abuse Screen Panel 13, Urine (Pain Care Package) - lab collect; Future    Diabetic polyneuropathy associated with type 2 diabetes mellitus  Stable and following  - HYDROcodone-acetaminophen (NORCO) 5-325 MG tablet; Take 1 tablet by mouth 3 times daily as needed for severe pain  - pregabalin (LYRICA) 100 MG capsule; Take 1 capsule (100 mg) by mouth 2 times daily       BMI:   Estimated body mass index is 31.99 kg/m  as calculated from the following:    Height as of this encounter: 1.445 m (4' 8.89\").    Weight as of this encounter: 66.8 kg (147 lb 4 oz).   Weight management plan: Discussed healthy diet and exercise guidelines    Depression Screening Follow Up        6/13/2023     3:41 PM   PHQ   PHQ-9 Total Score 12   Q9: Thoughts of better off dead/self-harm past 2 weeks Not at all         See Patient " Instructions    Lana Peguero MD  [facility] [place]    Subjective   Somkhith is a 78 year old, presenting for the following health issues:  Diabetes        6/13/2023     3:54 PM   Additional Questions   Roomed by Diane Lynne Schoenherr RN   Accompanied by daughter Pilo     History of Present Illness       Reason for visit:  Visit    She eats 4 or more servings of fruits and vegetables daily.She consumes 2 sweetened beverage(s) daily.She exercises with enough effort to increase her heart rate 9 or less minutes per day.  She exercises with enough effort to increase her heart rate 3 or less days per week. She is missing 1 dose(s) of medications per week.    Today's PHQ-9         PHQ-9 Total Score: 12    PHQ-9 Q9 Thoughts of better off dead/self-harm past 2 weeks :   Not at all    How difficult have these problems made it for you to do your work, take care of things at home, or get along with other people: Not difficult at all  Today's PHUONG-7 Score: 0       Diabetes Follow-up    How often are you checking your blood sugar? One time daily  What time of day are you checking your blood sugars (select all that apply)?  Before meals  Have you had any blood sugars above 200?  No  Have you had any blood sugars below 70?  No    What symptoms do you notice when your blood sugar is low?      What concerns do you have today about your diabetes?      Do you have any of these symptoms? (Select all that apply)  Numbness in feet and Blurry vision      BP Readings from Last 2 Encounters:   06/13/23 106/69   11/30/21 131/75     Hemoglobin A1C (%)   Date Value   06/09/2022 8.8 (H)   11/30/2021 8.2 (H)   03/19/2021 7.1 (H)   10/06/2020 7.1 (H)     LDL Cholesterol Calculated (mg/dL)   Date Value   06/09/2022 56   03/19/2021 41   02/11/2020 44           Seen today with daughter in follow-up of diabetes, hypertension, lipids, chronic pain  Doing well.  Reports no interval health concerns.        Review of  "Systems   Constitutional, HEENT, cardiovascular, pulmonary, gi and gu systems are negative, except as otherwise noted.      Objective    /69 (BP Location: Right arm, Patient Position: Sitting, Cuff Size: Adult Regular)   Pulse 104   Temp 98.3  F (36.8  C) (Tympanic)   Resp 16   Ht 1.445 m (4' 8.89\")   Wt 66.8 kg (147 lb 4 oz)   LMP  (LMP Unknown)   SpO2 97%   BMI 31.99 kg/m    Body mass index is 31.99 kg/m .  Physical Exam   Alert, pleasant, upbeat, and in no apparent discomfort.  S1 and S2 normal, no murmurs, clicks, gallops or rubs. Regular rate and rhythm. Chest is clear; no wheezes or rales. No edema or JVD.  Past labs reviewed with the patient.                     "

## 2023-06-13 NOTE — LETTER
Opioid / Opioid Plus Controlled Substance Agreement    This is an agreement between you and your provider about the safe and appropriate use of controlled substance/opioids prescribed by your care team. Controlled substances are medicines that can cause physical and mental dependence (abuse).    There are strict laws about having and using these medicines. We here at [facility] are committing to working with you in your efforts to get better. To support you in this work, we ll help you schedule regular office appointments for medicine refills. If we must cancel or change your appointment for any reason, we ll make sure you have enough medicine to last until your next appointment.     As a Provider, I will:  Listen carefully to your concerns and treat you with respect.   Recommend a treatment plan that I believe is in your best interest. This plan may involve therapies other than opioid pain medication.   Talk with you often about the possible benefits, and the risk of harm of any medicine that we prescribe for you.   Provide a plan on how to taper (discontinue or go off) using this medicine if the decision is made to stop its use.    As a Patient, I understand that opioid(s):   Are a controlled substance prescribed by my care team to help me function or work and manage my condition(s).   Are strong medicines and can cause serious side effects such as:  Drowsiness, which can seriously affect my driving ability  A lower breathing rate, enough to cause death  Harm to my thinking ability   Depression   Abuse of and addiction to this medicine  Need to be taken exactly as prescribed. Combining opioids with certain medicines or chemicals (such as illegal drugs, sedatives, sleeping pills, and benzodiazepines) can be dangerous or even fatal. If I stop opioids suddenly, I may have severe withdrawal symptoms.  Do not work for all types of pain nor for all patients. If they re not helpful, I may be asked to stop  them.        The risks, benefits and side effects of these medicine(s) were explained to me. I agree that:  I will take part in other treatments as advised by my care team. This may be psychiatry or counseling, physical therapy, behavioral therapy, group treatment or a referral to a specialist.     I will keep all my appointments. I understand that this is part of the monitoring of opioids. My care team may require an office visit for EVERY opioid/controlled substance refill. If I miss appointments or don t follow instructions, my care team may stop my medicine.    I will take my medicines as prescribed. I will not change the dose or schedule unless my care team tells me to. There will be no refills if I run out early.     I may be asked to come to the clinic and complete a urine drug test or complete a pill count at any time. If I don t give a urine sample or participate in a pill count, the care team may stop my medicine.    I will only receive prescriptions from this clinic for chronic pain. If I am treated by another provider for acute pain issues, I will tell them that I am taking opioid pain medication for chronic pain and that I have a treatment agreement with this provider. I will inform my [facility] care team within one business day if I am given a prescription for any pain medication by another healthcare provider. My [facility] care team can contact other providers and pharmacists about my use of any medicines.    It is up to me to make sure that I don t run out of my medicines on weekends or holidays. If my care team is willing to refill my opioid prescription without a visit, I must request refills only during office hours. Refills may take up to 3 business days to process. I will use one pharmacy to fill all my opioid and other controlled substance prescriptions. I will notify the clinic about any changes to my insurance or medication availability.    I am responsible for my  prescriptions. If the medicine/prescription is lost, stolen or destroyed, it will not be replaced. I also agree not to share controlled substance medicines with anyone.    I am aware I should not use any illegal or recreational drugs. I agree not to drink alcohol unless my care team says I can.       If I enroll in the [place] Medical Cannabis program, I will tell my care team prior to my next refill.     I will tell my care team right away if I become pregnant, have a new medical problem treated outside of my regular clinic, or have a change in my medications.    I understand that this medicine can affect my thinking, judgment and reaction time. Alcohol and drugs affect the brain and body, which can affect the safety of my driving. Being under the influence of alcohol or drugs can affect my decision-making, behaviors, personal safety, and the safety of others. Driving while impaired (DWI) can occur if a person is driving, operating, or in physical control of a car, motorcycle, boat, snowmobile, ATV, motorbike, off-road vehicle, or any other motor vehicle ([place] Statute 169A.20). I understand the risk if I choose to drive or operate any vehicle or machinery.    I understand that if I do not follow any of the conditions above, my prescriptions or treatment may be stopped or changed.          Opioids  What You Need to Know    What are opioids?   Opioids are pain medicines that must be prescribed by a doctor. They are also known as narcotics.     Examples are:   morphine (MS Contin, Glenna)  oxycodone (Oxycontin)  oxycodone and acetaminophen (Percocet)  hydrocodone and acetaminophen (Vicodin, Norco)   fentanyl patch (Duragesic)   hydromorphone (Dilaudid)   methadone  codeine (Tylenol #3)     What do opioids do well?   Opioids are best for severe short-term pain such as after a surgery or injury. They may work well for cancer pain. They may help some people with long-lasting (chronic) pain.     What do opioids NOT do  well?   Opioids never get rid of pain entirely, and they don t work well for most patients with chronic pain. Opioids don t reduce swelling, one of the causes of pain.                                    Other ways to manage chronic pain and improve function include:     Treat the health problem that may be causing pain  Anti-inflammation medicines, which reduce swelling and tenderness, such as ibuprofen (Advil, Motrin) or naproxen (Aleve)  Acetaminophen (Tylenol)  Antidepressants and anti-seizure medicines, especially for nerve pain  Topical treatments such as patches or creams  Injections or nerve blocks  Chiropractic or osteopathic treatment  Acupuncture, massage, deep breathing, meditation, visual imagery, aromatherapy  Use heat or ice at the pain site  Physical therapy   Exercise  Stop smoking  Take part in therapy       Risks and side effects     Talk to your doctor before you start or decide to keep taking opioids. Possible side effects include:    Lowering your breathing rate enough to cause death  Overdose, including death, especially if taking higher than prescribed doses  Worse depression symptoms; less pleasure in things you usually enjoy  Feeling tired or sluggish  Slower thoughts or cloudy thinking  Being more sensitive to pain over time; pain is harder to control  Trouble sleeping or restless sleep  Changes in hormone levels (for example, less testosterone)  Changes in sex drive or ability to have sex  Constipation  Unsafe driving  Itching and sweating  Dizziness  Nausea, throwing up and dry mouth    What else should I know about opioids?    Opioids may lead to dependence, tolerance, or addiction.    Dependence means that if you stop or reduce the medicine too quickly, you will have withdrawal symptoms. These include loose poop (diarrhea), jitters, flu-like symptoms, nervousness and tremors. Dependence is not the same as addiction.                     Tolerance means needing higher doses over time to  get the same effect. This may increase the chance of serious side effects.    Addiction is when people improperly use a substance that harms their body, their mind or their relations with others. Use of opiates can cause a relapse of addiction if you have a history of drug or alcohol abuse.    People who have used opioids for a long time may have a lower quality of life, worse depression, higher levels of pain and more visits to doctors.    You can overdose on opioids. Take these steps to lower your risk of overdose:    Recognize the signs:  Signs of overdose include decrease or loss of consciousness (blackout), slowed breathing, trouble waking up and blue lips. If someone is worried about overdose, they should call 911.    Talk to your doctor about Narcan (naloxone).   If you are at risk for overdose, you may be given a prescription for Narcan. This medicine very quickly reverses the effects of opioids.   If you overdose, a friend or family member can give you Narcan while waiting for the ambulance. They need to know the signs of overdose and how to give Narcan.     Don't use alcohol or street drugs.   Taking them with opioids can cause death.    Do not take any of these medicines unless your doctor says it s OK. Taking these with opioids can cause death:  Benzodiazepines, such as lorazepam (Ativan), alprazolam (Xanax) or diazepam (Valium)  Muscle relaxers, such as cyclobenzaprine (Flexeril)  Sleeping pills like zolpidem (Ambien)   Other opioids      How to keep you and other people safe while taking opioids:    Never share your opioids with others.  Opioid medicines are regulated by the Drug Enforcement Agency ([place]). Selling or sharing medications is a criminal act.    2. Be sure to store opioids in a secure place, locked up if possible. Young children can easily swallow them and overdose.    3. When you are traveling with your medicines, keep them in the original bottles. If you use a pill box, be sure you also  carry a copy of your medicine list from your clinic or pharmacy.    4. Safe disposal of opioids    Most pharmacies have places to get rid of medicine, called disposal kiosks. Medicine disposal options are also available in every [facility]. Search your county and  medication disposal  to find more options. You can find more details at:  [email or web address]-[email or web address]/[email or web address]-medications     I agree that my provider, clinic care team, and pharmacy may work with any city, state or federal law enforcement agency that investigates the misuse, sale, or other diversion of my controlled medicine. I will allow my provider to discuss my care with, or share a copy of, this agreement with any other treating provider, pharmacy or emergency room where I receive care.    I have read this agreement and have asked questions about anything I did not understand.    _______________________________________________________  Patient Signature - Enrique Pruitt _____________________                   Date     _______________________________________________________  Provider Signature - Lana Peguero MD   _____________________                   Date     _______________________________________________________  Witness Signature (required if provider not present while patient signing)   _____________________                   Date

## 2023-06-14 LAB
ALBUMIN SERPL BCG-MCNC: 4.2 G/DL (ref 3.5–5.2)
ALP SERPL-CCNC: 91 U/L (ref 35–104)
ALT SERPL W P-5'-P-CCNC: 10 U/L (ref 0–50)
AMPHETAMINES UR QL: NOT DETECTED
ANION GAP SERPL CALCULATED.3IONS-SCNC: 13 MMOL/L (ref 7–15)
AST SERPL W P-5'-P-CCNC: 19 U/L (ref 0–45)
BARBITURATES UR QL SCN: NOT DETECTED
BENZODIAZ UR QL SCN: NOT DETECTED
BILIRUB SERPL-MCNC: 0.4 MG/DL
BUN SERPL-MCNC: 8.8 MG/DL (ref 8–23)
BUPRENORPHINE UR QL: NOT DETECTED
CALCIUM SERPL-MCNC: 9.3 MG/DL (ref 8.8–10.2)
CANNABINOIDS UR QL: NOT DETECTED
CHLORIDE SERPL-SCNC: 103 MMOL/L (ref 98–107)
CHOLEST SERPL-MCNC: 144 MG/DL
COCAINE UR QL SCN: NOT DETECTED
CREAT SERPL-MCNC: 0.72 MG/DL (ref 0.51–0.95)
CREAT UR-MCNC: 65.3 MG/DL
D-METHAMPHET UR QL: NOT DETECTED
DEPRECATED HCO3 PLAS-SCNC: 23 MMOL/L (ref 22–29)
GFR SERPL CREATININE-BSD FRML MDRD: 85 ML/MIN/1.73M2
GLUCOSE SERPL-MCNC: 304 MG/DL (ref 70–99)
HDLC SERPL-MCNC: 67 MG/DL
LDLC SERPL CALC-MCNC: 47 MG/DL
METHADONE UR QL SCN: NOT DETECTED
MICROALBUMIN UR-MCNC: 60.4 MG/L
MICROALBUMIN/CREAT UR: 92.5 MG/G CR (ref 0–25)
NONHDLC SERPL-MCNC: 77 MG/DL
OPIATES UR QL SCN: NOT DETECTED
OXYCODONE UR QL SCN: NOT DETECTED
PCP UR QL SCN: NOT DETECTED
POTASSIUM SERPL-SCNC: 4.6 MMOL/L (ref 3.4–5.3)
PROPOXYPH UR QL: NOT DETECTED
PROT SERPL-MCNC: 7.3 G/DL (ref 6.4–8.3)
SODIUM SERPL-SCNC: 139 MMOL/L (ref 136–145)
TRICYCLICS UR QL SCN: NOT DETECTED
TRIGL SERPL-MCNC: 149 MG/DL

## 2023-06-14 NOTE — RESULT ENCOUNTER NOTE
Call daughter with results:  Her diabetes is just not well controlled.  Sugar numbers are even higher.  So we want to make sure that she is taking all of her medications as prescribed.  It may be somewhat of a dietary issue as well.  So 1 thing we could offer would be to meet with one of our diabetic nutritionist's if they are interested.    TIMMY Peguero M.D.

## 2023-06-15 ENCOUNTER — TELEPHONE (OUTPATIENT)
Dept: FAMILY MEDICINE | Facility: CLINIC | Age: 78
End: 2023-06-15
Payer: COMMERCIAL

## 2023-06-15 NOTE — TELEPHONE ENCOUNTER
Called Oudomphone, patient's daughter. Consent to communicate on file.     Notified her of provider's recommendations regarding patient's labs below. Daughter stated that patient is currently taking all medications as prescribed - specifically confirmed she was taking diabetes medications such as glimepiride 4 mg twice daily, Januvia 50 mg daily, Metformin 1000 mg twice daily, and is checking her blood sugars two times daily. She confirmed patient is.     Daughter stated that patient is currently sleeping. Will ask her once she wakes up if she would be interested in meeting with diabetic nutritionist. Will call clinic back once she has a response from patient.     Routing to provider as an FYI.    Clarisse Mckeon RN           ----- Message -----  From: Lana Peguero MD  Sent: 6/14/2023   2:52 PM CDT  To: [facility]    Call daughter with results:  Her diabetes is just not well controlled.  Sugar numbers are even higher.  So we want to make sure that she is taking all of her medications as prescribed.  It may be somewhat of a dietary issue as well.  So 1 thing we could offer would be to meet with one of our diabetic nutritionist's if they are interested.    TIMMY Peguero M.D.

## 2023-07-17 ENCOUNTER — PATIENT OUTREACH (OUTPATIENT)
Dept: GERIATRIC MEDICINE | Facility: CLINIC | Age: 78
End: 2023-07-17
Payer: COMMERCIAL

## 2023-07-17 ENCOUNTER — TELEPHONE (OUTPATIENT)
Dept: FAMILY MEDICINE | Facility: CLINIC | Age: 78
End: 2023-07-17
Payer: COMMERCIAL

## 2023-07-17 NOTE — PROGRESS NOTES
[facility] Care Coordination Contact      [facility] Six-Month Telephone Assessment    6 month telephone assessment completed on 7/17/23.    ER visits: No  Hospitalizations: No  TCU stays: No  Significant health status changes: None reported.   Falls/Injuries: No  ADL/IADL changes: No  Changes in services: No    Caregiver Assessment follow up:  N/A    Goals: See POC in chart for goal progress documentation.  Spoke to member's daughter, Pilo and she states member is doing well. No concerns with health and current support and services are meeting needs. Care Plan reviewed and updated.     Will see member in 6 months for an annual health risk assessment.   Encouraged member to call CC with any questions or concerns in the meantime.       Regine Stapleton RN, PHN  [facility]  [phone]

## 2023-07-17 NOTE — LETTER
Enrique Pruitt  [address] [address]    Dear Enrique,    At [facility] we care about your health and are committed to providing quality patient care.     Here is a list of Health Maintenance topics that are due now or due soon:  Health Maintenance Due   Topic Date Due    DEXA  Never done    ASTHMA ACTION PLAN  Never done    DTAP/TDAP/TD IMMUNIZATION (1 - Tdap) Never done    ZOSTER IMMUNIZATION (1 of 2) Never done    Pneumococcal Vaccine: 65+ Years (2 - PPSV23 if available, else PCV20) 12/12/2018    DIABETIC FOOT EXAM  12/19/2018    MEDICARE ANNUAL WELLNESS VISIT  11/30/2022    COVID-19 Vaccine (5 - Pfizer series) 05/11/2023        We are recommending that you:  Schedule a WELLNESS (Preventative/Physical) APPOINTMENT with your primary care provider. If you go elsewhere for your wellness appointments then please disregard this reminder     and   Schedule a Nurse-Only appointment to update your immunizations: Your records indicate that you are not up to date with your immunizations, please schedule a nurse-only appointment to get these updated or update them at your next office visit. If this is incorrect, please disregard.    To schedule an appointment or discuss this further, you may contact us by phone at the [facility] at [phone] or online through the patient [email or web address] @ [email or web address]    Thank you for trusting [facility] and we appreciate the opportunity to serve you.  We look forward to supporting your healthcare needs in the future.    Your partners in health,      Quality Committee at [facility]

## 2023-07-17 NOTE — TELEPHONE ENCOUNTER
Patient Quality Outreach    Patient is due for the following:   Diabetes -  Foot Exam  Asthma  -  AAP  Physical Annual Wellness Visit      Topic Date Due     Diptheria Tetanus Pertussis (DTAP/TDAP/TD) Vaccine (1 - Tdap) Never done     Zoster (Shingles) Vaccine (1 of 2) Never done     Pneumococcal Vaccine (2 - PPSV23 if available, else PCV20) 12/12/2018     COVID-19 Vaccine (5 - Pfizer series) 05/11/2023       Next Steps:   Schedule a Annual Wellness Visit    Type of outreach:    Sent letter.      Questions for provider review:    None           Anna Castro MA

## 2023-07-19 DIAGNOSIS — E11.42 DIABETIC POLYNEUROPATHY ASSOCIATED WITH TYPE 2 DIABETES MELLITUS (H): ICD-10-CM

## 2023-07-19 RX ORDER — HYDROCODONE BITARTRATE AND ACETAMINOPHEN 5; 325 MG/1; MG/1
1 TABLET ORAL 3 TIMES DAILY PRN
Qty: 50 TABLET | Refills: 0 | Status: SHIPPED | OUTPATIENT
Start: 2023-07-19 | End: 2023-08-17

## 2023-07-19 NOTE — TELEPHONE ENCOUNTER
Medication Question or Refill        What medication are you calling about (include dose and sig)?: HYDROcodone-acetaminophen (NORCO) 5-325 MG tablet    Preferred Pharmacy:  [id number] - [place], [address] AT [address]  [place] [address]  Phone: [phone] Fax: [phone]      Controlled Substance Agreement on file:   CSA -- Patient Level:     [Media Unavailable] Controlled Substance Agreement - Opioid - Scan on 6/13/2023  5:08 PM   [Media Unavailable] Controlled Substance Agreement - Opioid - Scan on 1/21/2022 12:19 PM   [Media Unavailable] Controlled Substance Agreement - Opioid - Scan on 12/6/2020  2:38 PM   [Media Unavailable] Controlled Substance Agreement - Opioid - Scan on 5/20/2019  6:59 AM       Who prescribed the medication?: Dr Peguero    Do you need a refill? Yes

## 2023-08-17 ENCOUNTER — TELEPHONE (OUTPATIENT)
Dept: FAMILY MEDICINE | Facility: CLINIC | Age: 78
End: 2023-08-17
Payer: COMMERCIAL

## 2023-08-17 DIAGNOSIS — E11.42 DIABETIC POLYNEUROPATHY ASSOCIATED WITH TYPE 2 DIABETES MELLITUS (H): ICD-10-CM

## 2023-08-17 RX ORDER — HYDROCODONE BITARTRATE AND ACETAMINOPHEN 5; 325 MG/1; MG/1
1 TABLET ORAL 3 TIMES DAILY PRN
Qty: 50 TABLET | Refills: 0 | Status: SHIPPED | OUTPATIENT
Start: 2023-08-17 | End: 2023-09-05

## 2023-08-17 NOTE — TELEPHONE ENCOUNTER
Reason for Call:  Medication or medication refill:    Do you use a [facility]?  Name of the pharmacy and phone number for the current request:  [facility] [place]     Name of the medication requested:HYDROcodone-acetaminophen (NORCO) 5-325 MG tablet     Other request:     Can we leave a detailed message on this number? YES    Phone number patient can be reached at: Cell number on file:    Telephone Information:   Mobile [phone]       Best Time:     Call taken on 8/17/2023 at 2:46 PM by Makenzie Robbins

## 2023-09-05 DIAGNOSIS — E11.42 DIABETIC POLYNEUROPATHY ASSOCIATED WITH TYPE 2 DIABETES MELLITUS (H): ICD-10-CM

## 2023-09-05 RX ORDER — HYDROCODONE BITARTRATE AND ACETAMINOPHEN 5; 325 MG/1; MG/1
1 TABLET ORAL 3 TIMES DAILY PRN
Qty: 50 TABLET | Refills: 0 | Status: SHIPPED | OUTPATIENT
Start: 2023-09-05 | End: 2023-10-23

## 2023-09-05 NOTE — TELEPHONE ENCOUNTER
Medication Question or Refill        What medication are you calling about (include dose and sig)?: HYDROcodone-acetaminophen (NORCO) 5-325 MG tablet     Preferred Pharmacy:[id number] - [place], [address] AT [address]  [place] [address]  Phone: [phone] Fax: [phone]      Controlled Substance Agreement on file:   CSA -- Patient Level:     [Media Unavailable] Controlled Substance Agreement - Opioid - Scan on 6/13/2023  5:08 PM   [Media Unavailable] Controlled Substance Agreement - Opioid - Scan on 1/21/2022 12:19 PM   [Media Unavailable] Controlled Substance Agreement - Opioid - Scan on 12/6/2020  2:38 PM   [Media Unavailable] Controlled Substance Agreement - Opioid - Scan on 5/20/2019  6:59 AM       Who prescribed the medication?: Dr Peguero    Do you need a refill? Yes    Patient offered an appointment? Yes: 10/23

## 2023-10-23 ENCOUNTER — OFFICE VISIT (OUTPATIENT)
Dept: FAMILY MEDICINE | Facility: CLINIC | Age: 78
End: 2023-10-23
Payer: COMMERCIAL

## 2023-10-23 VITALS
TEMPERATURE: 97.5 F | DIASTOLIC BLOOD PRESSURE: 72 MMHG | WEIGHT: 150.6 LBS | HEART RATE: 111 BPM | RESPIRATION RATE: 23 BRPM | OXYGEN SATURATION: 98 % | SYSTOLIC BLOOD PRESSURE: 114 MMHG | BODY MASS INDEX: 33.88 KG/M2 | HEIGHT: 56 IN

## 2023-10-23 DIAGNOSIS — K21.9 GASTROESOPHAGEAL REFLUX DISEASE WITHOUT ESOPHAGITIS: ICD-10-CM

## 2023-10-23 DIAGNOSIS — E78.5 HYPERLIPIDEMIA LDL GOAL <70: ICD-10-CM

## 2023-10-23 DIAGNOSIS — F11.90 CHRONIC, CONTINUOUS USE OF OPIOIDS: ICD-10-CM

## 2023-10-23 DIAGNOSIS — R05.1 ACUTE COUGH: ICD-10-CM

## 2023-10-23 DIAGNOSIS — Z23 ENCOUNTER FOR IMMUNIZATION: ICD-10-CM

## 2023-10-23 DIAGNOSIS — I10 ESSENTIAL HYPERTENSION WITH GOAL BLOOD PRESSURE LESS THAN 140/90: ICD-10-CM

## 2023-10-23 DIAGNOSIS — H10.022 PINK EYE DISEASE OF LEFT EYE: ICD-10-CM

## 2023-10-23 DIAGNOSIS — Z23 NEED FOR VACCINATION: ICD-10-CM

## 2023-10-23 DIAGNOSIS — E11.42 DIABETIC POLYNEUROPATHY ASSOCIATED WITH TYPE 2 DIABETES MELLITUS (H): ICD-10-CM

## 2023-10-23 DIAGNOSIS — N18.2 CKD (CHRONIC KIDNEY DISEASE) STAGE 2, GFR 60-89 ML/MIN: ICD-10-CM

## 2023-10-23 DIAGNOSIS — E11.42 TYPE 2 DIABETES MELLITUS WITH DIABETIC POLYNEUROPATHY, WITHOUT LONG-TERM CURRENT USE OF INSULIN (H): Primary | ICD-10-CM

## 2023-10-23 PROCEDURE — 90677 PCV20 VACCINE IM: CPT | Performed by: FAMILY MEDICINE

## 2023-10-23 PROCEDURE — G0009 ADMIN PNEUMOCOCCAL VACCINE: HCPCS | Performed by: FAMILY MEDICINE

## 2023-10-23 PROCEDURE — 90662 IIV NO PRSV INCREASED AG IM: CPT | Performed by: FAMILY MEDICINE

## 2023-10-23 PROCEDURE — G0008 ADMIN INFLUENZA VIRUS VAC: HCPCS | Performed by: FAMILY MEDICINE

## 2023-10-23 PROCEDURE — 99214 OFFICE O/P EST MOD 30 MIN: CPT | Mod: 25 | Performed by: FAMILY MEDICINE

## 2023-10-23 RX ORDER — HYDROCODONE BITARTRATE AND ACETAMINOPHEN 5; 325 MG/1; MG/1
1 TABLET ORAL 3 TIMES DAILY PRN
Qty: 50 TABLET | Refills: 0 | Status: SHIPPED | OUTPATIENT
Start: 2023-10-23 | End: 2023-12-11

## 2023-10-23 RX ORDER — SIMVASTATIN 20 MG
20 TABLET ORAL DAILY
Qty: 90 TABLET | Refills: 1 | Status: SHIPPED | OUTPATIENT
Start: 2023-10-23 | End: 2024-04-29

## 2023-10-23 RX ORDER — CODEINE PHOSPHATE AND GUAIFENESIN 10; 100 MG/5ML; MG/5ML
1-2 SOLUTION ORAL EVERY 4 HOURS PRN
Qty: 240 ML | Refills: 0 | Status: SHIPPED | OUTPATIENT
Start: 2023-10-23

## 2023-10-23 RX ORDER — TOBRAMYCIN 3 MG/ML
1-2 SOLUTION/ DROPS OPHTHALMIC 3 TIMES DAILY
Qty: 5 ML | Refills: 0 | Status: SHIPPED | OUTPATIENT
Start: 2023-10-23 | End: 2023-10-26

## 2023-10-23 RX ORDER — RESPIRATORY SYNCYTIAL VIRUS VACCINE 120MCG/0.5
0.5 KIT INTRAMUSCULAR ONCE
Qty: 1 EACH | Refills: 0 | Status: CANCELLED | OUTPATIENT
Start: 2023-10-23 | End: 2023-10-23

## 2023-10-23 ASSESSMENT — PAIN SCALES - GENERAL: PAINLEVEL: NO PAIN (0)

## 2023-10-23 NOTE — PROGRESS NOTES
Assessment & Plan     Type 2 diabetes mellitus with diabetic polyneuropathy, without long-term current use of insulin (H)  A1c has not been well controlled.  Again discussed making sure she takes medications and eats properly.  - metFORMIN (GLUCOPHAGE) 500 MG tablet; Take 2 tablets (1,000 mg) by mouth 2 times daily (with meals)    Diabetic polyneuropathy associated with type 2 diabetes mellitus (H)  Stable and following.  Up-to-date on routine monitoring  - HYDROcodone-acetaminophen (NORCO) 5-325 MG tablet; Take 1 tablet by mouth 3 times daily as needed for severe pain    Essential hypertension with goal blood pressure less than 140/90  Stable on current regimen.  Continue same plan and routine follow-up.     Hyperlipidemia LDL goal <70  Stable on current regimen.  Continue same plan and routine follow-up.   - simvastatin (ZOCOR) 20 MG tablet; Take 1 tablet (20 mg) by mouth daily    CKD (chronic kidney disease) stage 2, GFR 60-89 ml/min  Stable and following    Chronic, continuous use of opioids  Up-to-date on routine monitoring    Gastroesophageal reflux disease without esophagitis  Stable on current regimen.  Continue same plan and routine follow-up.   - omeprazole (PRILOSEC) 20 MG DR capsule; Take 1 capsule (20 mg) by mouth daily    Acute cough  Discussed mechanism of action of the proposed medication, as well as potential effects, both good and bad.  Patient expressed understanding and agreed with treatment.   - guaiFENesin-codeine (ROBITUSSIN AC) 100-10 MG/5ML solution; Take 5-10 mLs by mouth every 4 hours as needed for cough    Pink eye disease of left eye  Discussed mechanism of action of the proposed medication, as well as potential effects, both good and bad.  Patient expressed understanding and agreed with treatment.   - tobramycin (TOBREX) 0.3 % ophthalmic solution; Place 1-2 drops Into the left eye 3 times daily for 3 days    Encounter for immunization  Flu shot today.  Discussed other recommended  "vaccinations       See Patient Instructions    Lana Peguero MD  [facility] [place]    Subjective   Somkhith is a 78 year old, presenting for the following health issues:  Recheck Medication, Diabetes, Eye Problem (Twitching and watery eyes on going for 2 weeks ), and Immunization        10/23/2023     1:51 PM   Additional Questions   Roomed by Flavia Abraham   Accompanied by Daughter       History of Present Illness       Diabetes:   She presents for follow up of diabetes.    She is not checking blood glucose.         She has no concerns regarding her diabetes at this time.   She is not experiencing numbness or burning in feet, excessive thirst, blurry vision, weight changes or redness, sores or blisters on feet.           She eats 4 or more servings of fruits and vegetables daily.She consumes 2 sweetened beverage(s) daily.She exercises with enough effort to increase her heart rate 9 or less minutes per day.  She exercises with enough effort to increase her heart rate 3 or less days per week.   She is taking medications regularly.           Here today in follow-up of diabetes, neuropathy, other chronic conditions.  Has had a cough going on for a bit and some discharge from her left eye.      Review of Systems   Constitutional, HEENT, cardiovascular, pulmonary, gi and gu systems are negative, except as otherwise noted.      Objective    /72 (BP Location: Right arm, Patient Position: Sitting, Cuff Size: Adult Regular)   Pulse 111   Temp 97.5  F (36.4  C) (Temporal)   Resp 23   Ht 1.422 m (4' 8\")   Wt 68.3 kg (150 lb 9.6 oz)   LMP  (LMP Unknown)   SpO2 98%   BMI 33.76 kg/m    Body mass index is 33.76 kg/m .  Physical Exam   Alert, pleasant, upbeat, and in no apparent discomfort.  S1 and S2 normal, no murmurs, clicks, gallops or rubs. Regular rate and rhythm. Chest is clear; no wheezes or rales. No edema or JVD.  Past labs reviewed with the patient.   Left eye nonspecifically reddened " but not a lot of matter                    Prior to immunization administration, verified patients identity using patient s name and date of birth. Please see Immunization Activity for additional information.     Screening Questionnaire for Adult Immunization    Are you sick today?   No   Do you have allergies to medications, food, a vaccine component or latex?   No   Have you ever had a serious reaction after receiving a vaccination?   No   Do you have a long-term health problem with heart, lung, kidney, or metabolic disease (e.g., diabetes), asthma, a blood disorder, no spleen, complement component deficiency, a cochlear implant, or a spinal fluid leak?  Are you on long-term aspirin therapy?   No   Do you have cancer, leukemia, HIV/AIDS, or any other immune system problem?   No   Do you have a parent, brother, or sister with an immune system problem?   No   In the past 3 months, have you taken medications that affect  your immune system, such as prednisone, other steroids, or anticancer drugs; drugs for the treatment of rheumatoid arthritis, Crohn s disease, or psoriasis; or have you had radiation treatments?   No   Have you had a seizure, or a brain or other nervous system problem?   No   During the past year, have you received a transfusion of blood or blood    products, or been given immune (gamma) globulin or antiviral drug?   No   For women: Are you pregnant or is there a chance you could become       pregnant during the next month?   No   Have you received any vaccinations in the past 4 weeks?   No     Immunization questionnaire answers were all negative.      Patient instructed to remain in clinic for 15 minutes afterwards, and to report any adverse reactions.     Screening performed by Flavia Rosario MA on 10/23/2023 at 1:58 PM.

## 2023-10-23 NOTE — PATIENT INSTRUCTIONS
Recommended shots (get at pharmacy):    1) COVID booster    2) RSV    3) Prevnar 20 (pneumonia shot)

## 2023-10-23 NOTE — COMMUNITY RESOURCES LIST (ENGLISH)
10/23/2023   [facility]  N/A  For questions about this resource list or additional care needs, please contact your primary care clinic or care manager.  Phone: [phone]   Email: N/A   Address: [address] [place] [address]   Hours: N/A        Financial Stability       Utility payment assistance  1  Community Action [facility] ([facility]) - Low Income Home Energy Assistance Program (LIHEAP) Distance: 1.99 miles      In-Person   [address] [place] [address]  Language: English  Hours: Mon 8:00 AM - 4:30 PM , Tue 8:00 AM - 7:00 PM , Wed 8:00 AM - 4:30 PM , Thu 8:00 AM - 7:00 PM , Fri 8:00 AM - 4:30 PM  Fees: Free   Phone: (662) 487-7439 Email: info@[facility] Website: https://[facility].[employer]ce[employer]/     2  [facility] - Emergency Assistance Program (EAP) - Utilities Distance: 2.42 miles      In-Person, Phone/Virtual   [address] [place] [address]  Language: American Sign Language, English  Hours: Mon - Fri 8:00 AM - 4:00 PM  Fees: Free   Phone: (113) 629-3543 Email: carlitos@Bath. Website: https://www.[place]./residents#human-services          Hotlines and Helplines       Hotline - Housing crisis  3  [facility] Distance: 7.63 miles      Phone/Virtual   [address] [place] [address]  Language: English  Hours: Mon - Sun Open 24 Hours  Fees: Free   Phone: (223) 358-3002 Email: info@saintolaf.[employer] Website: http://www.saintolaf.Cellceutix/     4  [facility] Distance: 7.85 miles      Phone/Virtual   [address] [place] [address]  Language: English  Hours: Mon - Sun Open 24 Hours   Phone: (791) 834-2073 Email: info@Flexenclosure.org Website: http://www.Flexenclosure.org          Housing       Coordinated Entry access point  5  Adult Shelter Connect (ASC) Distance: 6.89 miles      In-Person, Phone/Virtual   [address] [place] [address]  Language: English, Yakut  Hours: Mon - Fri 10:00 AM - 5:30 PM , Mon - Sun 7:30 PM - 10:20 PM , Sat - Sun 1:00 PM - 5:30 PM  Fees: Free   Phone: (495) 549-1256 Email: info@"[employer] Website: https://www.Vi[facility] Island HospitalsTruevision/our-programs/adult-shelter-[facility]/     6  [facility] - Adult [facility] Distance: 7.63 miles      In-Person   [address] [place] [address]  Language: English  Hours: Mon - Sat 10:00 PM - 5:00 PM  Fees: Free   Phone: (409) 665-8693 Email: info@saintolaf.org Website: http://www.saintolaf.org/     Drop-in center or day shelter  7  [facility] Distance: 6.77 miles      In-Person   [address] [place] [address]  Language: English, Hmong, Panamanian, Yakut  Hours: Mon - Thu 8:30 AM - 4:30 PM , Sat - Sun 9:00 AM - 12:00 PM  Fees: Free   Phone: (218) 907-6995 Email: info@[employer] Website: https://HYLA Mobile.org/     8  [facility] Distance: 8.22 miles      In-Person   [address] [place] [address]  Language: English  Hours: Mon - Fri 12:00 PM - 3:00 PM  Fees: Free   Phone: (246) 389-5544 Email: M[employer]@S[employer].LifePay Website: http://Misfit Wearables.org/     Housing search assistance  9  Community Action [facility] Distance: 1.99 miles      In-Person   [address] [facility] [place] [address]  Language: English  Hours: Mon - Fri 8:00 AM - 4:00 PM  Fees: Free   Phone: (429) 250-5366 Email: info@Hoblee.[employer] Website: https://Hoblee.org/     10  Ne[employer] [place] (NACA) Distance: 3.4 miles      Phone/Virtual   [address] [place] [address]  Language: English, Yakut  Hours: Mon - Fri 9:00 AM - 5:00 PM  Fees: Free   Phone: (300) 729-9441 Email: services@Tablefinder Website: https://www.Tablefinder     Shelter for families  [facility]  [facility] Family assisted - [place] Distance: 19.81 miles      In-Person   [address] [facility] [place] [address]  Language: English  Hours: Mon - Fri 3:00 PM - 9:00 AM , Sat - Sun Open 24 Hours  Fees: Free   Phone: (827) 944-8227 Ext.1 Website: https://www.saintandrews.org/2020/07/03/emergency-family-shelter/     Shelter for individuals  12  [facility] Distance: 7.13 miles      In-Person   [address] [place] [address]  Language: English  Hours: Mon - Fri 4:00 PM - 9:00 AM  Fees: Free   Phone: (762) 790-9354 Email: josh@[facility] Website: https://centralusa.[facility].org/[facility]/     13  [facility] Distance: 8.64 miles      In-Person   [address] [place] [address]  Language: English  Hours: Mon - Sun Open 24 Hours  Fees: Free   Phone: (224) 225-7716 Email: communications@[facility].org Website: https://[facility] HospitalOnavomn.org/oursaviourshousing/          Transportation       Free or low-cost transportation  14  [facility] - [employer]es - Free or low-cost transportation Distance: 7.21 miles      In-Person   [address] [place] [address]  Language: English  Hours: Tue 9:30 AM - 11:30 AM , Thu 9:30 AM - 11:30 AM  Fees: Free   Phone: (444) 576-6195 Email: info@terence.[employer] Website: http://www.Green Genes/     15  [facility] Distance: 7.63 miles      In-Person   [address] [place] [address]  Language: English  Hours: Mon - Wed 9:30 AM - 12:00 PM , Mon - Wed 1:00 PM - 2:00 PM Appt. Only  Fees: Free   Phone: (575) 883-8397 Email: info@saintolaf.[employer] Website: http://www.saintolaf.[employer]/     Transportation to medical appointments  16  [place] Transportation Distance: 1.15 miles      In-Person   [address] [facility] [address] [place] [address]  Language: English  Hours: Mon - Sat 4:00 AM - 6:00 PM  Fees: Insurance, Self Pay   Phone: (236) 480-9244 Email: delighttransportation1@Recognition PRO.Yasuu Website:  https://helpmeconnect.web.[facility].mn.us/HelpMeConnect/Providers/Delight_Transportation/Transportation/2?returnUrl=%2FHelpMeConnect%2FSearch%2FBasicNeeds%2FTransportation%2FTransportationServices%3Fstart%3D40     17  [facility] Family [facility] (AIF) Distance: 3.64 miles      In-Person   [address] [facility] [place] [address]  Language: Portuguese, Lithuanian, English, Gujarati, Shannon, Mongolian, Bengali, Amharic, Serbian, Belarusian  Hours: Mon - Wed 9:00 AM - 5:00 PM , Thu 12:00 PM - 6:00 PM , Fri 9:00 AM - 5:00 PM , Sun 10:30 AM - 2:00 PM Appt. Only  Fees: Free   Phone: (177) 261-3891 Email: info@[facility].org Website: https://www.[facility].org/          Important Numbers & Websites       Emergency Services   911  City Services   311  Poison Control   (692) 929-2286  Suicide Prevention Lifeline   (411) 272-1106 (TALK)  Child Abuse Hotline   (102) 264-4397 (4-A-Child)  Sexual Assault Hotline   (236) 475-9237 (HOPE)  National Runaway Safeline   (550) 529-8916 (RUNAWAY)  All-Options Talkline   (669) 762-6400  Substance Abuse Referral   (789) 220-8683 (HELP)

## 2023-10-23 NOTE — COMMUNITY RESOURCES LIST (PATIENT PREFERRED LANGUAGE)
10/23/2023   [facility]  N/A  ?????????????????????????????????????????????????????????????????????????, ????????????????????????????????????????????????????????.  Phone: [phone]   Email: N/A   Address: [address] [place] [address]   Hours: N/A        ????????????????????????       ?????????????????????????????????????????  1  ??????????????????????????????????? Ashlie (CAP-RERE) - ????????????????????????????????????????????????? (LIHEA) ???????: 1.99 ??      ??????   [address] [place] [address]  ????: ??????????  ???????: ?????? 8:00 - 16:30 , ????????? 8:00 - 19:00 , ?????? 8:00 - 16:30 , ???????? 8:00 - 19:00 , ??????? 8:00 - 16:30  ????????: ???   Phone: (756) 977-8695 Email: info@draren.org Website: https://[facility].org/     2  ?????? Ashlie - ?????????????????????????????????????????? - ?????????????????????? (EAP) - ?????? ???????: 2.42 ??      ??????, ??????? / Virtual   [address] [place] [address]  ????: ????????????????????, ??????????  ???????: ?????? - ??????? 8:00 - 16:00  ????????: ???   Phone: (274) 964-1888 Email: carlitos@Wells. Website: https://www.Wells./residents#human-services          ???????????????????       ??????? - ???????????????  3  ??? [facility] - ??????? - ?????????????????? ???????: 7.63 ??      ??????? / Virtual   [address] [place] [address]  ????: ??????????  ???????: ?????? - ???????? ???? 24 ???????  ????????: ???   Phone: (581) 625-7218 Email: info@saintola.[employer] Website: http://www.saintolaf.org/     4  [facility] [place] - ??????? - ?????????????????? ???????: 7.85 ??      ??????? / Virtual   [address] [place] [address]  ????: ??????????  ???????: ?????? - ???????? ???? 24 ???????   Phone: [phone] [phone] Email: info@cornerstonemn.org Website: http://www.cornerstonemn.org          ??????????       ???????????????????????  5   Adult Shelter Connect (ASC) ???????: 6.89 ??      ??????, ??????? / Virtual   [address] [place] [address]  ????: ??????????, ????????  ???????: ?????? - ??????? 10:00 - 17:30 , ?????? - ???????? 19:30 - 22:20 , ??????? - ???????? 13:00 - 17:30  ????????: ???   Phone: (798) 642-2482 Email: info@Bioaxial Website: https://www.Bioaxial/ourMDdatacorprograms/adult-shelter-connect-[facility]/     6  ??? [facility] - Adult Shelter Connect - [facility] ???????: 7.63 ??      ??????   [address] [place] [address]  ????: ??????????  ???????: ?????? - ??????? 22:00 - 17:00  ????????: ???   Phone: [phone] [phone] Email: info@saintolaf.org Website: http://www.saintolaf.org/     ??????????????????????????????  7  ?????????? ???????????? - ?????? ??????????????????? ???????: 6.77 ??      ??????   [address] [place] [address]  ????: ??????????, ????, ????????, ??????  ???????: ?????? - ???????? 8:30 - 16:30 , ??????? - ???????? 9:00 - 12:00  ????????: ???   Phone: [phone] [phone] Email: info@Phigital.SpaBoom Website: https://Phigital.SpaBoom/     8  ?????????????????? - ?????? ??????????????????? ???????: 8.22 ??      ??????   [address] [place] [address]  ????: ??????????  ???????: ?????? - ??????? 12:00 - 15:00  ????????: ???   Phone: [phone] 780-7633 Email: Loop Commerce@[employer] Website: http://Loop Commerce.SpaBoom/     ??????????????????????????????????  9  ??????????????????????????????????? Ashlie (CAP-HC) - ???????????????????????????? ???????: 1.99 ??      ??????   [address] [place] [address]  ????: ??????????  ???????: ?????? - ??????? 8:00 - 16:00  ????????: ???   Phone: [phone] [phone] Email: info@caphennepin.org Website: https://caphennepin.org/     10  ??????? [employer] [facility] [place] (NACA) - ???????????????????????????? ???????: 3.4 ??      ??????? / Virtual   [address] [address]  ????: ??????????, ????????  ???????: ?????? - ??????? 9:00 - 17:00  ????????: ???   Phone: [phone] Email: services@Interlude Website: https://www.naca.com     ??????????????????????  11  ???????????????? [facility] ???????: 19.81 ??      ??????   [address] [place] [address]  ????: ??????????  ???????: ?????? - ??????? 15:00 - 9:00 , ??????? - ???????? ???? 24 ???????  ????????: ???   Phone: (813) 936-2583 ???.1 Website: https://www.saintandrews.org/2020/07/03/emergency-family-shelter/     ??????????????????????  12  [facility] - ????????????? [place] ???????: 7.13 ??      ??????   [address] [place] [address]  ????: ??????????  ???????: ?????? - ??????? 16:00 - 9:00  ????????: ???   Phone: ([phone] Email: josh@[facility].salvationarmy.org Website: https://[facility].salvationarmy.org/[facility]/     13  ??????????????????????????????????????? ???????: 8.64 ??      ??????   [address] [place] [address]  ????: ??????????  ???????: ?????? - ???????? ???? 24 ???????  ????????: ???   Phone: [phone]) 412-5210 Email: communications@[facility].org Website: https://oscs-mn.org/oursconnorourshousing/          ??????????       ???????????????????????  14  ????????? [facility] - ???????? - ????????????? ?????????? ???????: 7.21 ??      ??????   [address] [place] [address]  ????: ??????????  ???????: ????????? 9:30 - 11:30 , ???????? 9:30 - 11:30  ????????: ???   Phone: ([phone] 366-9113 Email: info@terence.org Website: http://www.terence.org/     15  ??? [place] [employer] ???????: 7.63 ??      ??????   [address] [place] [address]  ????: ??????????  ???????: ?????? - ?????? 9:30 - 12:00 , ?????? - ?????? 13:00 - 14:00 ????. ?????????  ????????: ???   Phone: [phone]) [phone] Email: info@saintolaf.org Website: http://www.saintolaf.org/     ????????????????????????????????  16  ?????????????????????? ???????:  1.15 ??      ??????   [address] [facility] [address] [place] [address]  ????: ??????????  ???????: ?????? - ??????? 4:00 - 18:00  ????????: ???????, ???????   Phone: (453) [phone] Email: delighttransportation1@[employer] Website: https://helpmeconnect.Hi-Dis(Mosen).[facility]./HelpMeConnect/Providers/Delshahab_Transportation/Transportation/2?returnUrl=%2FHelpMeConnect%2FSearch%2FBasicNeeds%2FTransportation%2FTransportationServices%3Fstart%3D40     17  [place] -   Family Wellness (AIFW) ???????: 3.64 ??      ??????   [address] [place] [address]  ????: ????????, ??????????, ?????, ?????, ????, ?????, ??????, ?????, ????????, ??????  ???????: ?????? - ?????? 9:00 - 17:00 , ???????? 12:00 - 18:00 , ??????? 9:00 - 17:00 , ???????? 10:30 - 14:00 ????. ?????????  ????????: ???   Phone: [phone]) [phone] Email: amy@sewa-aifw.org Website: https://www.sewa-aifw.org/          ??????????????? & ????????       ??????????????   911  ???????????????   311  ???????????????   [phone]  Lifeline ??????????????????????   [phone] (TALK)  ????????????????????????   [phone] (4-A-Child)  ??????????????????   [phone] (HOPE)  National Runaway Safeline   [phone] [phone] (RUNAWAY)  ???????? Talkline ??? ???   [phone]  ????????????? ?? ????????? ???? ???   [phone] (HELP)

## 2023-10-23 NOTE — PROGRESS NOTES
Prior to immunization administration, verified patients identity using patient s name and date of birth. Please see Immunization Activity for additional information.     Screening Questionnaire for Adult Immunization    Are you sick today?   No   Do you have allergies to medications, food, a vaccine component or latex?   No   Have you ever had a serious reaction after receiving a vaccination?   No   Do you have a long-term health problem with heart, lung, kidney, or metabolic disease (e.g., diabetes), asthma, a blood disorder, no spleen, complement component deficiency, a cochlear implant, or a spinal fluid leak?  Are you on long-term aspirin therapy?   No   Do you have cancer, leukemia, HIV/AIDS, or any other immune system problem?   No   Do you have a parent, brother, or sister with an immune system problem?   No   In the past 3 months, have you taken medications that affect  your immune system, such as prednisone, other steroids, or anticancer drugs; drugs for the treatment of rheumatoid arthritis, Crohn s disease, or psoriasis; or have you had radiation treatments?   No   Have you had a seizure, or a brain or other nervous system problem?   No   During the past year, have you received a transfusion of blood or blood    products, or been given immune (gamma) globulin or antiviral drug?   No   For women: Are you pregnant or is there a chance you could become       pregnant during the next month?   No   Have you received any vaccinations in the past 4 weeks?   No     Immunization questionnaire answers were all negative.      Patient instructed to remain in clinic for 15 minutes afterwards, and to report any adverse reactions.     Screening performed by Elizabeth Lilly MA on 10/23/2023 at 2:50 PM.

## 2023-11-06 ENCOUNTER — PATIENT OUTREACH (OUTPATIENT)
Dept: GERIATRIC MEDICINE | Facility: CLINIC | Age: 78
End: 2023-11-06
Payer: COMMERCIAL

## 2023-11-06 NOTE — PROGRESS NOTES
[facility] Care Coordination Contact    Spoke with Lisa from [facility] regarding member's PCA and homemaker service. Per Lisa, [facility] has been trying to reach member/family to complete a qualified professional visit. Per [facility], if they are not able to reach and schedule a visit with member, services with [facility] will terminate 12/2/2023.    Left a VM for member/daughter (Pilo) to contact Care Coordinator regarding [facility]'s request for a visit.     Regine Stapleton RN, PHN  [facility]  [phone]

## 2023-11-07 ENCOUNTER — PATIENT OUTREACH (OUTPATIENT)
Dept: GERIATRIC MEDICINE | Facility: CLINIC | Age: 78
End: 2023-11-07
Payer: COMMERCIAL

## 2023-11-07 NOTE — PROGRESS NOTES
[facility] Care Coordination Contact    Care Coordinator spoke to Enrique's daughter, Pilo and advised her to contact [facility] to schedule member's qualified professional visit. Melyssaarnelheena verbalized understanding and will call [facility] on 11/8/23.     Regine Stapleton RN, PHN  [facility]  [phone]

## 2023-12-05 ENCOUNTER — PATIENT OUTREACH (OUTPATIENT)
Dept: GERIATRIC MEDICINE | Facility: CLINIC | Age: 78
End: 2023-12-05
Payer: COMMERCIAL

## 2023-12-05 NOTE — PROGRESS NOTES
[facility] Care Coordination Contact    Called adult daughter Jaleel  to schedule annual HRA home visit. HRA has been scheduled for 12/12/23 at 11:30 am.    A Croatian  from [facility] will be scheduled for the home visit.       Regine Stapleton RN, PHN  [facility]  [phone]

## 2023-12-09 DIAGNOSIS — E11.42 TYPE 2 DIABETES MELLITUS WITH DIABETIC POLYNEUROPATHY, WITHOUT LONG-TERM CURRENT USE OF INSULIN (H): ICD-10-CM

## 2023-12-11 DIAGNOSIS — E11.42 DIABETIC POLYNEUROPATHY ASSOCIATED WITH TYPE 2 DIABETES MELLITUS (H): ICD-10-CM

## 2023-12-11 RX ORDER — HYDROCODONE BITARTRATE AND ACETAMINOPHEN 5; 325 MG/1; MG/1
1 TABLET ORAL 3 TIMES DAILY PRN
Qty: 50 TABLET | Refills: 0 | Status: SHIPPED | OUTPATIENT
Start: 2023-12-11 | End: 2024-01-10

## 2023-12-11 RX ORDER — SITAGLIPTIN 50 MG/1
50 TABLET, FILM COATED ORAL DAILY
Qty: 90 TABLET | Refills: 0 | Status: SHIPPED | OUTPATIENT
Start: 2023-12-11 | End: 2024-01-10

## 2023-12-11 NOTE — TELEPHONE ENCOUNTER
Refilled x 1.  Needs visit (OFV or video visit) before any further refill.  Pain follow up Alonzo

## 2023-12-12 ENCOUNTER — PATIENT OUTREACH (OUTPATIENT)
Dept: GERIATRIC MEDICINE | Facility: CLINIC | Age: 78
End: 2023-12-12
Payer: COMMERCIAL

## 2023-12-12 NOTE — Clinical Note
Hi Dr. Peguero,  I completed an annual assessment with your patient, Enrique Pruitt on 12/12/24. Patient states her health is stable and family continues to provide informal support. Enrique will resume PCA, homemaker and monthly DME services.   Thank you,  Regine Stapleton RN, [facility] [phone]

## 2023-12-13 ASSESSMENT — LIFESTYLE VARIABLES
AUDIT-C TOTAL SCORE: 0
SKIP TO QUESTIONS 9-10: 1

## 2023-12-13 NOTE — PROGRESS NOTES
[facility] Care Coordination Contact    [facility] Home Visit Assessment     Home visit for Health Risk Assessment with Enrique Pruitt completed on December 12, 2023    Type of residence:: Private home - stairs  Current living arrangement:: I live in a private home with family     Assessment completed with:: Patient, Care Team Member, Family    Current Care Plan  Member currently receiving the following home care services:     Member currently receiving the following community resources: DME, Housekeeping/Chore Agency, PCA, Transportation Services      Medication Review  Medication reconciliation completed in Epic: Yes  Medication set-up & administration: Family/informal caregiver sets up weekly.  Family caregiver administers medications.  Medication Risk Assessment Medication (1 or more, place referral to MTM): N/A: No risk factors identified  MTM Referral Placed: No: No risk factors idenified    Mental/Behavioral Health   Depression Screening:   PHQ-2 Total Score (Adult) - Positive if 3 or more points; Administer PHQ-9 if positive: 0       Mental health DX:: No        Falls Assessment:   Fallen 2 or more times in the past year?: No   Any fall with injury in the past year?: No    ADL/IADL Dependencies:   Dependent ADLs:: Ambulation-walker, Ambulation-cane, Bathing, Dressing, Eating, Grooming, Incontinence, Transfers, Wheelchair-with assist, Toileting  Dependent IADLs:: Cleaning, Cooking, Laundry, Shopping, Meal Preparation, Medication Management, Money Management, Transportation, Incontinence    Health Plan [facility] benefits: [facility]: Shared information regarding [employer] Pass Fitness Program. Reviewed preventative health screening and health plan supplemental benefits/incentives. Reviewed medication disposal form.    PCA Assessment completed at visit: Yes Annual PCA assessment indicated 6.25 hours per day of PCA. This is the same as the previous assessment.     Elderly Waiver Eligibility:  Yes-will continue on EW    Care Plan & Recommendations: Annual home visit completed with member, Pilo (daughter) and Gisselle Vang , Mikaela Garvin. Enrique is 78 years old,  and lives with her daughter and son in law in a single family home. Enrique states her health is stable. Enrique reports having radiating pain in both legs, chronic shoulder pain, numbness in feet and hands r/t to neuropathy. Enrique is managing pain with medications, massage, rest and walking as tolerable. Family continues to provide informal support in addition to PCA and homemaker. Enrique is dependent with most IADLS/ADLS. Enrique will resume PCA, homemaker and monthly DME supplies. No falls, ED or hospitalization reported within the past year. Encourage member/family to contact Care Coordinator as needed.     See LTCC for detailed assessment information.    Follow-Up Plan: Member informed of future contact, plan to f/u with member with a 6 month telephone assessment.  Contact information shared with member and family, encouraged member to call with any questions or concerns at any time.    [place] care continuum providers: Please see Snapshot and Care Management Flowsheets for Specific details of care plan.    This CC note routed to PCP, Lana Peguero.      Regine Stapleton RN, PHN  [facility]  [phone]

## 2023-12-21 ENCOUNTER — PATIENT OUTREACH (OUTPATIENT)
Dept: GERIATRIC MEDICINE | Facility: CLINIC | Age: 78
End: 2023-12-21
Payer: COMMERCIAL

## 2023-12-21 DIAGNOSIS — E11.42 DIABETIC POLYNEUROPATHY ASSOCIATED WITH TYPE 2 DIABETES MELLITUS (H): Primary | ICD-10-CM

## 2023-12-21 NOTE — PROGRESS NOTES
[facility] Care Coordination Contact    DME supply(s) have been requested by the patient. DME orders(s) have been queued up and pended for the provider to review. Patient reports the need for a shower chair. Once completed, please route the encounter to care coordinator to fax completed documentation and order requisition to [facility].      Thank you,    Regine Stapleton RN, PHN  [facility]  [phone]

## 2023-12-22 NOTE — TELEPHONE ENCOUNTER
Happy to sign the order.  I am just wondering whether Medicare is going to require a visit for this kind of DME.  As far as I know we have not discussed at a previous appointment.

## 2023-12-26 ENCOUNTER — PATIENT OUTREACH (OUTPATIENT)
Dept: GERIATRIC MEDICINE | Facility: CLINIC | Age: 78
End: 2023-12-26
Payer: COMMERCIAL

## 2023-12-26 NOTE — LETTER
December 26, 2023      ENRIQUE NEWBERRY  [address]  [place] [address]      Dear Enrique:    At [facility], we re dedicated to improving your health and wellness. Enclosed is the Care Plan developed with you on 12/12/2023. Please review the Care Plan carefully.    As a reminder, during your visit we talked about:  Ways to manage your physical and mental health  Using health care to maintain and improve your health   Your preventive care needs     Remember to contact your care coordinator if you:  Are hospitalized, or plan to be hospitalized   Have a fall    Have a change in your physical or mental health  Need help finding support or services    If you have questions, or don t agree with your Care Plan, call me at [phone]. You can also call me if your needs change. TTY users, call the [place] Relay at (903) or 1-884.470.5342 (ndvpow-gi-xttxiw relay service).    Sincerely,    Regine Stapleton RN, PHN    E-mail:[email or web address]  Phone: [phone]    Care Manager  [facility]) is a health plan that contracts with both Medicare and the [place] Medical Assistance (Medicaid) program to provide benefits of both programs to enrollees. Enrollment in [facility] depends on contract renewal.    [id number] accepted    [id number] (07/2022)

## 2023-12-26 NOTE — LETTER
[facility] Advance Care Planning       Enrique Pruitt  [address]  [place] [address]      Dear Enrique    You shared with me your interest in receiving information on Advance Care Planning and Health Care Directives. Discussing and making decisions about this part of our health is very important.  A Health Care Directive is a written document that outlines your goals, values, beliefs and choices for health care and medical treatment in the event you are unable to speak for yourself.     We greatly value the opportunity to assist you in documenting your choices and to honor your   wishes. We ve enclosed Health Care Directive & Educational materials to help you get started thinking about your values and goals. We have several options for additional resources:     Health Care Directives and Advance Care Planning resources can be viewed and printed   for free at our web site:  [email or web address].     Free group classes on Advance Care Planning and completing a Health Care Directive are available at multiple locations and times. These classes are led by trained staff who will provide information and guide you through a Health Care Directive.  They can also review, notarize and add your Health Care Directive to your medical record. [place] for a class at [email or web address] or by calling [employer] at [phone] or toll free [phone].    COPIES of completed Health Care Directives can be brought or mailed to any of our   locations, including the address listed below. You can also email a copy to [email or web address] .    Email or call me at the contact information listed below for questions, assistance, or to   make an appointment to discuss creating a Health Care Directive. You can also contact   our [facility] Department for questions or assistance.       Sincerely,     Regine Stapleton RN PHN  [facility]  [phone]

## 2023-12-26 NOTE — PROGRESS NOTES
[facility] Care Coordination Contact    Received after visit chart from care coordinator.  Completed following tasks: Mailed copy of care plan/support plan to member, Mailed health care directive documents, Mailed [place] Choices signature sheet pages 3-4, Mailed Safe Medication Disposal , Submitted referrals/auths for Homemaking & wipes, and Updated services in Database  , Provider Signature - No POC Shared:  Member indicates that they do not want their POC shared with any EW providers.    [facility]:  Emailed required PCA documents to [facility].  Faxed copy of PCA assessment to PCA Agency and mailed copy to member.  Faxed MD Communication to PCP.     Mirna Justice  Care Management Specialist  [facility]  [phone]

## 2024-01-02 DIAGNOSIS — E11.42 DIABETIC POLYNEUROPATHY ASSOCIATED WITH TYPE 2 DIABETES MELLITUS (H): ICD-10-CM

## 2024-01-02 RX ORDER — PREGABALIN 100 MG/1
100 CAPSULE ORAL 2 TIMES DAILY
Qty: 180 CAPSULE | Refills: 1 | Status: SHIPPED | OUTPATIENT
Start: 2024-01-02 | End: 2024-06-26

## 2024-01-09 ENCOUNTER — OFFICE VISIT (OUTPATIENT)
Dept: AUDIOLOGY | Facility: CLINIC | Age: 79
End: 2024-01-09
Payer: COMMERCIAL

## 2024-01-09 DIAGNOSIS — H90.3 SENSORINEURAL HEARING LOSS, BILATERAL: Primary | ICD-10-CM

## 2024-01-09 PROCEDURE — 92593 PR HEARING AID CHECK, BINAURAL: CPT | Performed by: AUDIOLOGIST

## 2024-01-09 NOTE — PROGRESS NOTES
HEARING AID RECHECK    Patient Name:  Enrique Pruitt    Patient Age:   78 year old    :  1945    Background:   Patient complains that her hearing aids are not working.  She is here with her daughter.  Hearing aids came in discharged, so I put a 1 hour charge on them.    SIDE: Both    : Phonak    TYPE: Audeo P70R    S/N: [id number] / [id number]     WARRANTY: 3/26/26    Procedures:   Visual and listening check completed. Both hearing aids were missing wax traps and both c shells were plugged with ear wax. I removed the ear wax and good gain and sound quality were restored. I gave them 2 packages of cerustop wax guards and replacement instructions.  Otoscopy revealed significant wax in right ear.    Plan:   Recommended they see there PCP for ear cleaning and return for service as needed.      David Hill MA, CCC-A  [place] Licensed Audiologist #[id number]  2024

## 2024-01-10 ENCOUNTER — OFFICE VISIT (OUTPATIENT)
Dept: FAMILY MEDICINE | Facility: CLINIC | Age: 79
End: 2024-01-10
Payer: COMMERCIAL

## 2024-01-10 VITALS
SYSTOLIC BLOOD PRESSURE: 125 MMHG | WEIGHT: 138.4 LBS | RESPIRATION RATE: 23 BRPM | OXYGEN SATURATION: 97 % | HEIGHT: 57 IN | BODY MASS INDEX: 29.86 KG/M2 | TEMPERATURE: 99.2 F | DIASTOLIC BLOOD PRESSURE: 71 MMHG | HEART RATE: 113 BPM

## 2024-01-10 DIAGNOSIS — H61.21 IMPACTED CERUMEN OF RIGHT EAR: Primary | ICD-10-CM

## 2024-01-10 DIAGNOSIS — E11.42 DIABETIC POLYNEUROPATHY ASSOCIATED WITH TYPE 2 DIABETES MELLITUS (H): ICD-10-CM

## 2024-01-10 DIAGNOSIS — N18.2 CKD (CHRONIC KIDNEY DISEASE) STAGE 2, GFR 60-89 ML/MIN: ICD-10-CM

## 2024-01-10 DIAGNOSIS — E11.42 TYPE 2 DIABETES MELLITUS WITH DIABETIC POLYNEUROPATHY, WITHOUT LONG-TERM CURRENT USE OF INSULIN (H): ICD-10-CM

## 2024-01-10 DIAGNOSIS — F11.90 CHRONIC, CONTINUOUS USE OF OPIOIDS: ICD-10-CM

## 2024-01-10 DIAGNOSIS — E78.5 HYPERLIPIDEMIA LDL GOAL <70: ICD-10-CM

## 2024-01-10 DIAGNOSIS — I10 HYPERTENSION GOAL BP (BLOOD PRESSURE) < 140/90: ICD-10-CM

## 2024-01-10 PROBLEM — S82.832D CLOSED FRACTURE OF DISTAL END OF LEFT FIBULA WITH ROUTINE HEALING, UNSPECIFIED FRACTURE MORPHOLOGY, SUBSEQUENT ENCOUNTER: Status: RESOLVED | Noted: 2017-05-17 | Resolved: 2024-01-10

## 2024-01-10 LAB — HBA1C MFR BLD: 10.6 % (ref 0–5.6)

## 2024-01-10 PROCEDURE — 83036 HEMOGLOBIN GLYCOSYLATED A1C: CPT | Performed by: FAMILY MEDICINE

## 2024-01-10 PROCEDURE — 99214 OFFICE O/P EST MOD 30 MIN: CPT | Mod: 25 | Performed by: FAMILY MEDICINE

## 2024-01-10 PROCEDURE — 69209 REMOVE IMPACTED EAR WAX UNI: CPT | Mod: RT | Performed by: FAMILY MEDICINE

## 2024-01-10 PROCEDURE — 36415 COLL VENOUS BLD VENIPUNCTURE: CPT | Performed by: FAMILY MEDICINE

## 2024-01-10 RX ORDER — PIOGLITAZONEHYDROCHLORIDE 45 MG/1
45 TABLET ORAL DAILY
Qty: 90 TABLET | Refills: 1 | Status: SHIPPED | OUTPATIENT
Start: 2024-01-10 | End: 2024-06-26

## 2024-01-10 RX ORDER — HYDROCODONE BITARTRATE AND ACETAMINOPHEN 5; 325 MG/1; MG/1
1 TABLET ORAL 3 TIMES DAILY PRN
Qty: 50 TABLET | Refills: 0 | Status: SHIPPED | OUTPATIENT
Start: 2024-01-10 | End: 2024-02-15

## 2024-01-10 RX ORDER — GLIMEPIRIDE 4 MG/1
4 TABLET ORAL 2 TIMES DAILY
Qty: 180 TABLET | Refills: 1 | Status: SHIPPED | OUTPATIENT
Start: 2024-01-10 | End: 2024-06-26

## 2024-01-10 ASSESSMENT — ASTHMA QUESTIONNAIRES
QUESTION_1 LAST FOUR WEEKS HOW MUCH OF THE TIME DID YOUR ASTHMA KEEP YOU FROM GETTING AS MUCH DONE AT WORK, SCHOOL OR AT HOME: NONE OF THE TIME
QUESTION_2 LAST FOUR WEEKS HOW OFTEN HAVE YOU HAD SHORTNESS OF BREATH: NOT AT ALL
QUESTION_4 LAST FOUR WEEKS HOW OFTEN HAVE YOU USED YOUR RESCUE INHALER OR NEBULIZER MEDICATION (SUCH AS ALBUTEROL): NOT AT ALL
ACT_TOTALSCORE: 24
ACT_TOTALSCORE: 24
QUESTION_5 LAST FOUR WEEKS HOW WOULD YOU RATE YOUR ASTHMA CONTROL: WELL CONTROLLED
QUESTION_3 LAST FOUR WEEKS HOW OFTEN DID YOUR ASTHMA SYMPTOMS (WHEEZING, COUGHING, SHORTNESS OF BREATH, CHEST TIGHTNESS OR PAIN) WAKE YOU UP AT NIGHT OR EARLIER THAN USUAL IN THE MORNING: NOT AT ALL

## 2024-01-10 ASSESSMENT — PAIN SCALES - GENERAL: PAINLEVEL: NO PAIN (0)

## 2024-01-10 NOTE — PROGRESS NOTES
"  Assessment & Plan     Impacted cerumen of right ear  Was seen through audiology for hearing aid fitting and noted to have some ongoing wax in your ears.  Some of it was removed.  Upon exam today her left canal is fairly clear but the right canal is occluded with cerumen that was easily lavaged out.  Now it looks clear  - ID REMOVAL IMPACTED CERUMEN IRRIGATION/LVG UNILAT (RN/MA); Standing    Type 2 diabetes mellitus with diabetic polyneuropathy, without long-term current use of insulin (H)  Recheck and treat as indicated.  Control has not been great.  - HEMOGLOBIN A1C; Future  - Adult Eye  Referral; Future  - sitagliptin (JANUVIA) 50 MG tablet; Take 1 tablet (50 mg) by mouth daily  - glimepiride (AMARYL) 4 MG tablet; Take 1 tablet (4 mg) by mouth 2 times daily  - pioglitazone (ACTOS) 45 MG tablet; Take 1 tablet (45 mg) by mouth daily  - HEMOGLOBIN A1C    Diabetic polyneuropathy associated with type 2 diabetes mellitus (H)    - HYDROcodone-acetaminophen (NORCO) 5-325 MG tablet; Take 1 tablet by mouth 3 times daily as needed for severe pain    Hypertension goal BP (blood pressure) < 140/90  Stable on current regimen.  Continue same plan and routine follow-up.     CKD (chronic kidney disease) stage 2, GFR 60-89 ml/min  Stable and following    Hyperlipidemia LDL goal <70  Stable and following    Chronic, continuous use of opioids  Up-to-date on routine monitoring       BMI:   Estimated body mass index is 29.86 kg/m  as calculated from the following:    Height as of this encounter: 1.45 m (4' 9.09\").    Weight as of this encounter: 62.8 kg (138 lb 6.4 oz).       See Patient Instructions    Lana Peguero MD  [facility] [place]    Subjective   Somkhith is a 78 year old, presenting for the following health issues:  Ear Problem (Ear cleaning. Was seen by audiology yesterday and was recommended to see PCP for ear cleaning) and Medication Request (Needs all medications refilled.)        " "1/10/2024    10:30 AM   Additional Questions   Roomed by Melanie RAMSEY   Accompanied by Daughter         1/10/2024    10:30 AM   Patient Reported Additional Medications   Patient reports taking the following new medications None       History of Present Illness       Reason for visit:  Ear clean    She eats 4 or more servings of fruits and vegetables daily.She consumes 2 sweetened beverage(s) daily.She exercises with enough effort to increase her heart rate 9 or less minutes per day.  She exercises with enough effort to increase her heart rate 3 or less days per week.   She is taking medications regularly.           Here today to clean the ears but needs follow-up on ongoing conditions as well.      Review of Systems   HENT:  Positive for ear pain.       Constitutional, HEENT, cardiovascular, pulmonary, gi and gu systems are negative, except as otherwise noted.      Objective    /71 (BP Location: Right arm, Patient Position: Sitting, Cuff Size: Adult Small)   Pulse 113   Temp 99.2  F (37.3  C) (Oral)   Resp 23   Ht 1.45 m (4' 9.09\")   Wt 62.8 kg (138 lb 6.4 oz)   LMP  (LMP Unknown)   SpO2 97%   BMI 29.86 kg/m    Body mass index is 29.86 kg/m .  Physical Exam   Alert, pleasant, upbeat, and in no apparent discomfort.  Left ear canal with minimal wax  Right ear canal occluded with hard wax that was easily lavaged out.  Now things look normal behind that  S1 and S2 normal, no murmurs, clicks, gallops or rubs. Regular rate and rhythm. Chest is clear; no wheezes or rales. No edema or JVD.  Past labs reviewed with the patient.                       "

## 2024-01-10 NOTE — COMMUNITY RESOURCES LIST (ENGLISH)
01/10/2024   [facility]  N/A  For questions about this resource list or additional care needs, please contact your primary care clinic or care manager.  Phone: [phone]   Email: N/A   Address: [address] [place] [address]   Hours: N/A        Hotlines and Helplines       Hotline - Housing crisis  1  [facility] Distance: 7.63 miles      Phone/Virtual   [address] [place] [address]  Language: English  Hours: Mon - Sun Open 24 Hours  Fees: Free   Phone: (961) 432-2478 Email: info@saintolaf.org Website: http://www.saintolaf.org/     2  [facility] Distance: 7.85 miles      Phone/Virtual   [address] [place] [address]  Language: English  Hours: Mon - Sun Open 24 Hours   Phone: (135) 339-8576 Email: info@PlayRaven.org Website: http://www.KeenSkim.org          Housing       Coordinated Entry access point  3  [facility] (ASC) Distance: 6.89 miles      In-Person, Phone/Virtual   [address] [place] [address]  Language: English, Serbian  Hours: Mon - Fri 10:00 AM - 5:30 PM , Mon - Sun 7:30 PM - 10:20 PM , Sat - Sun 1:00 PM - 5:30 PM  Fees: Free   Phone: (719) 505-3787 Email: info@MailMag.org Website: https://www.MailMag.org/our-programs/adult-shelter-connect-[facility]/     4  [facility] - Adult [facility] Connect - [facility] Distance: 7.63 miles      In-Person   [address] [place] [id number]  Language: English  Hours: Mon - Sat 10:00 PM - 5:00 PM  Fees: Free   Phone: (801) 308-7173 Email: info@saintolaf.Me-Mover Website: http://www.saintolaf.org/     Drop-in center or day shelter  5  [facility] Distance: 6.77 miles      In-Person   [address] [place] [address]  Language: English, Hmong, Tanzanian, Serbian  Hours: Mon - Thu 8:30 AM - 4:30 PM , Sat - Sun 9:00 AM - 12:00 PM  Fees: Free   Phone: (171) 816-4942 Email:  info@117go.org Website: https://117go.org/     6  [facility] Distance: 8.22 miles      In-Person   [address] [place] [address]  Language: English  Hours: Mon - Fri 12:00 PM - 3:00 PM  Fees: Free   Phone: (247) 230-7311 Email: kylahIP [facility]@"[employer]Cubits Website: http://Cloudkick.QuantumSphere/     Housing search assistance  7  [facility] ([facility] Distance: 1.99 miles      In-Person   [address] [place] [address]  Language: English  Hours: Mon - Fri 8:00 AM - 4:00 PM  Fees: Free   Phone: (877) 651-7280 Email: info@[facility].QuantumSphere Website: https://Mumart.QuantumSphere/     8  Neighborhood Assistance [employer] [place] [employer] Distance: 3.4 miles      Phone/Virtual   [address] [place] [address]  Language: English, Sami  Hours: Mon - Fri 9:00 AM - 5:00 PM  Fees: Free   Phone: (265) 804-8896 Email: services@[employer] Website: https://www.[employer]     Shelter for families  9  [facility] Distance: 19.81 miles      In-Person   [address] [place] [address]  Language: English  Hours: Mon - Fri 3:00 PM - 9:00 AM , Sat - Sun Open 24 Hours  Fees: Free   Phone: (428) 144-4299 Ext.1 Website: https://www.saintandrews.org/2020/07/03/emergency-family-shelter/     Shelter for individuals  10  [facility] Distance: 7.13 miles      In-Person   [address] [place] [address]  Language: English  Hours: Mon - Fri 4:00 PM - 9:00 AM  Fees: Free   Phone: (269) 419-1918 Email: josh@[facility].org Website: https://centralRehabilitation Hospital of [facility].org/northern/[facility]/     11  [facility] Distance: 8.64 miles      In-Person   [address] [place] [address]  Language: English  Hours: Mon - Sun Open 24 Hours  Fees: Free   Phone: (942) 647-4347 Email: communications@oscs-[place].org Website:  https://oscs-mn.org/oursaviourshousing/          Important Numbers & Websites       Emergency Services   911  [facility] Services   311  Poison Control   (770) 845-4784  Suicide Prevention Lifeline   (213) 795-4637 (TALK)  Child Abuse Hotline   (246) 911-7718 (4-A-Child)  Sexual Assault Hotline   (607) 206-7662 (HOPE)  National Runaway Safeline   (371) 373-4277 (RUNAWAY)  All-Options Talkline   (642) 630-7187  Substance Abuse Referral   (651) 769-4719 (HELP)

## 2024-01-10 NOTE — LETTER
January 15, 2024      Enrique Pruitt  [address]  [place] [address]        Dear ,    We are writing to inform you of your test results.    Hi, guys,     Well, that sugar level looks lousy.  It seems like it has been getting worse over the last 2 years.  Please make sure to take the medications as prescribed.  If there are questions about your medications or other issues related we can certainly talk about that.  And obviously try to have your diet focus more on proteins and vegetables and fruit, and really try to limit carbohydrates as they raise your blood sugar.  So try to avoid bread and rice and noodles and things like that.     Resulted Orders   HEMOGLOBIN A1C   Result Value Ref Range    Hemoglobin A1C 10.6 (H) 0.0 - 5.6 %      Comment:      Normal <5.7%   Prediabetes 5.7-6.4%    Diabetes 6.5% or higher     Note: Adopted from ADA consensus guidelines.       If you have any questions or concerns, please call the clinic at the number listed above.       Sincerely,      Lana Peguero MD

## 2024-01-10 NOTE — COMMUNITY RESOURCES LIST (PATIENT PREFERRED LANGUAGE)
01/10/2024   [facility]  N/A  ?????????????????????????????????????????????????????????????????????????, ????????????????????????????????????????????????????????.  Phone: [phone]   Email: N/A   Address: [address] [place] [address]   Hours: N/A        ??????? ??????????       ??????? - ??????????????????  1  ??? [employer] ???????: 7.63 ??      ??????? / Virtual   [address] [place] [address]  ????: ??????????  ???????: ?????? - ???????? ???? 24 ???????  ????????: ???   Phone: (323) 229-8964 Email: info@saintola.[facility] Website: http://www.saintola.org/     2  [facility] ???????: 1.99 ??      ??????? / Virtual   [address] [place] [address]  ????: ??????????  ???????: ?????? - ???????? ???? 24 ???????   Phone: (767) 453-5629 Email: Alaris Royalty@BagThatBlo[facility].doo Website: http://www.[place]kanoo.org          ??????????       ???????????????????????  3  [facility]ult Shelter Connect (ASC) ???????: 7.13 ??      ??????, ??????? / Virtual   [address] [place] [address]  ????: ??????????, ????????  ???????: ?????? - ??????? 10:00 - 17:30 , ?????? - ???????? 19:30 - 22:20 , ??????? - ???????? 13:00 - 17:30  ????????: ???   Phone: (771) 176-9162 Email: info@wrenchguys mobileProvidence VA Medical CenterCelona Technologies Website: https://www.VIPerks/ourTextbook Rental Canadaprograms/adult-shelter-connect-[facility]/     4  ??? [facility] - Adult Shelter Connect - [facility] ???????: 7.85 ??      ??????   [address] [place] [address]  ????: ??????????  ???????: ?????? - ??????? 22:00 - 17:00  ????????: ???   Phone: (965) 413-3240 Email: info@saintolaf.org Website: http://www.saintolaf.org/     ??????????????????????????????  5  ?????????? ???????????? - ?????? ??????????????????? ???????: 6.89 ??      ??????   [address] [place] [address]  ????: ??????????, ????, ????????, ??????  ???????: ?????? - ???????? 8:30 - 16:30 , ??????? - ???????? 9:00 - 12:00   ????????: ???   Phone: (628) 308-8220 Email: info@PlanviewingPicLyfs.org Website: https://Kashless.org/     6  ?????????????????? - ?????? ??????????????????? ???????: 8.22 ??      ??????   [address] [place] [address]  ????: ??????????  ???????: ?????? - ??????? 12:00 - 15:00  ????????: ???   Phone: (735) 435-8289 Email: SynGen@[employer] Website: http://SynGen.PROnoise/     ????????????????????????????  7  ??????????????????????????????????? [place]lie (CAP-HC) ???????: 3.4 ??      ??????   [address] [place] [address]  ????: ??????????  ???????: ?????? - ??????? 8:00 - 16:00  ????????: ???   Phone: (647) 400-4570 Email: info@Zairgehennepin.org Website: https://MTPV.org/     8  ??????? [employer] (NACA) ???????: 6.77 ??      ??????? / Virtual   [address] [place] [address]  ????: ??????????, ????????  ???????: ?????? - ??????? 9:00 - 17:00  ????????: ???   Phone: (812) 626-1705 Email: InnomiNet@TissueIn[employer] Website: https://www.naca.com     ??????????????????????  9  ???????????????? [address]  [place] ???????: 19.81 ??      ??????   [address] [place] [address]  ????: ??????????  ???????: ?????? - ??????? 15:00 - 9:00 , ??????? - ???????? ???? 24 ???????  ????????: ???   Phone: (784) 653-3639 ???.1 Website: https://www.saintandrews.org/2020/07/03/emergency-family-shelter/     ??????????????????????  10  [facility] - ????????????? [place] ???????: 7.63 ??      ??????   [address] [place] [address]  ????: ??????????  ???????: ?????? - ??????? 16:00 - 9:00  ????????: ???   Phone: ([phone] Email: josh@[facility].salvationarmy.org Website: https://centralNor[facility].salvationarmy.org/[facility]er/     11  ??????????????????????????????????????? ???????: 8.64 ??      ??????   [address] [place] [id number]  ????: ??????????  ???????: ??????  - ???????? ???? 24 ???????  ????????: ???   Phone: (961) 282-1631 Email: communications@oscs-[place].org Website: https://oscs-[place].org/oursaviourshousing/          ??????????????? & ????????       ??????????????   911  ???????????????   311  ???????????????   [phone]  Lifeline ??????????????????????   [phone] [phone] (TALK)  ????????????????????????   [phone] (4-A-Child)  ??????????????????   [phone] [phone] (HOPE)  National Runaway Safeline   (934) 354-1779 (RUNAWAY)  ???????? Talkline ??? ???   ([phone] [phone]  ????????????? ?? ????????? ???? ???   [phone] (HELP)

## 2024-01-15 NOTE — RESULT ENCOUNTER NOTE
Please mail results and note to patient:    marie Ward,  Well, that sugar level looks lousy.  It seems like it has been getting worse over the last 2 years.  Please make sure to take the medications as prescribed.  If there are questions about your medications or other issues related we can certainly talk about that.  And obviously try to have your diet focus more on proteins and vegetables and fruit, and really try to limit carbohydrates as they raise your blood sugar.  So try to avoid bread and rice and noodles and things like that.  TIMMY Peguero M.D.

## 2024-01-19 DIAGNOSIS — I10 ESSENTIAL HYPERTENSION WITH GOAL BLOOD PRESSURE LESS THAN 140/90: ICD-10-CM

## 2024-01-19 RX ORDER — AMLODIPINE BESYLATE 10 MG/1
10 TABLET ORAL DAILY
Qty: 30 TABLET | Refills: 0 | Status: SHIPPED | OUTPATIENT
Start: 2024-01-19 | End: 2024-02-20

## 2024-02-05 ENCOUNTER — MEDICAL CORRESPONDENCE (OUTPATIENT)
Dept: HEALTH INFORMATION MANAGEMENT | Facility: CLINIC | Age: 79
End: 2024-02-05
Payer: COMMERCIAL

## 2024-02-05 ENCOUNTER — TELEPHONE (OUTPATIENT)
Dept: FAMILY MEDICINE | Facility: CLINIC | Age: 79
End: 2024-02-05
Payer: COMMERCIAL

## 2024-02-05 NOTE — TELEPHONE ENCOUNTER
Forms/Letter Request     Type of form/letter: Levar, Prescription/ Certificate of Medical Necessity, Effective Date:01/31/2024     Have you been seen for this request: N/A     Do we have the form/letter: Yes: Will place form on providers desk for review/signature     When is form/letter needed by: ASAP     How would you like the form/letter returned: Fax : [phone]

## 2024-02-08 ENCOUNTER — TELEPHONE (OUTPATIENT)
Dept: FAMILY MEDICINE | Facility: CLINIC | Age: 79
End: 2024-02-08
Payer: COMMERCIAL

## 2024-02-08 NOTE — LETTER
Enrique Pruitt  [address] [address]    Dear Enrique,    At [facility] we care about your health and are committed to providing quality patient care.     Here is a list of Health Maintenance topics that are due now or due soon:  Health Maintenance Due   Topic Date Due    DEXA  Never done    ASTHMA ACTION PLAN  Never done    DTAP/TDAP/TD IMMUNIZATION (1 - Tdap) Never done    ZOSTER IMMUNIZATION (1 of 2) Never done    RSV VACCINE (Pregnancy & 60+) (1 - 1-dose 60+ series) Never done    DIABETIC FOOT EXAM  12/19/2018    MEDICARE ANNUAL WELLNESS VISIT  11/30/2022    COVID-19 Vaccine (5 - 2023-24 season) 09/01/2023    EYE EXAM  12/15/2023        We are recommending that you:  Schedule a WELLNESS (Preventative/Physical) APPOINTMENT with your primary care provider. If you go elsewhere for your wellness appointments then please disregard this reminder     and   Schedule a Nurse-Only appointment to update your immunizations: Your records indicate that you are not up to date with your immunizations, please schedule a nurse-only appointment to get these updated or update them at your next office visit. If this is incorrect, please disregard.    To schedule an appointment or discuss this further, you may contact us by phone at the [facility] at [phone] or online through the patient [email or web address] @ [email or web address]    Thank you for trusting [facility] and we appreciate the opportunity to serve you.  We look forward to supporting your healthcare needs in the future.    Your partners in health,      Quality Committee at [facility]

## 2024-02-08 NOTE — TELEPHONE ENCOUNTER
Patient Quality Outreach    Patient is due for the following:   Diabetes -  Foot Exam  Asthma  -  AAP  Physical Annual Wellness Visit      Topic Date Due    Diptheria Tetanus Pertussis (DTAP/TDAP/TD) Vaccine (1 - Tdap) Never done    Zoster (Shingles) Vaccine (1 of 2) Never done    COVID-19 Vaccine (5 - 2023-24 season) 09/01/2023       Next Steps:   Schedule a Annual Wellness Visit    Type of outreach:    Sent letter.    Next Steps:  Reach out within 90 days via [employer].    Max number of attempts reached: No. Will try again in 90 days if patient still on fail list.    Questions for provider review:    None           Anna Castro MA

## 2024-02-15 DIAGNOSIS — L30.9 DERMATITIS: ICD-10-CM

## 2024-02-15 DIAGNOSIS — E11.42 DIABETIC POLYNEUROPATHY ASSOCIATED WITH TYPE 2 DIABETES MELLITUS (H): ICD-10-CM

## 2024-02-15 RX ORDER — TRIAMCINOLONE ACETONIDE 1 MG/G
CREAM TOPICAL
Qty: 60 G | Refills: 1 | Status: SHIPPED | OUTPATIENT
Start: 2024-02-15 | End: 2024-08-19

## 2024-02-15 NOTE — TELEPHONE ENCOUNTER
Medication Question or Refill        What medication are you calling about (include dose and sig)?: HYDROcodone-acetaminophen (NORCO) 5-325 MG tablet   triamcinolone (KENALOG) 0.1 % external cream     Preferred Pharmacy:  [facility] #[id number] - [place], [place] - [address] [place] [address] AT [address]  [place] [address]  Phone: [phone] Fax: [phone]        Controlled Substance Agreement on file:   CSA -- Patient Level:     [Media Unavailable] Controlled Substance Agreement - Opioid - Scan on 6/13/2023  5:08 PM   [Media Unavailable] Controlled Substance Agreement - Opioid - Scan on 1/21/2022 12:19 PM   [Media Unavailable] Controlled Substance Agreement - Opioid - Scan on 12/6/2020  2:38 PM   [Media Unavailable] Controlled Substance Agreement - Opioid - Scan on 5/20/2019  6:59 AM       Who prescribed the medication?: Dr Peguero    Do you need a refill? Yes    When did you use the medication last? Daily     Okay to leave a detailed message?: Yes at Cell number on file:    Telephone Information:   Mobile [phone]

## 2024-02-16 RX ORDER — HYDROCODONE BITARTRATE AND ACETAMINOPHEN 5; 325 MG/1; MG/1
1 TABLET ORAL 3 TIMES DAILY PRN
Qty: 50 TABLET | Refills: 0 | Status: SHIPPED | OUTPATIENT
Start: 2024-02-16 | End: 2024-04-08

## 2024-02-20 DIAGNOSIS — I10 ESSENTIAL HYPERTENSION WITH GOAL BLOOD PRESSURE LESS THAN 140/90: ICD-10-CM

## 2024-02-20 RX ORDER — AMLODIPINE BESYLATE 10 MG/1
10 TABLET ORAL DAILY
Qty: 90 TABLET | Refills: 1 | Status: SHIPPED | OUTPATIENT
Start: 2024-02-20 | End: 2024-06-26

## 2024-03-13 ENCOUNTER — MEDICAL CORRESPONDENCE (OUTPATIENT)
Dept: HEALTH INFORMATION MANAGEMENT | Facility: CLINIC | Age: 79
End: 2024-03-13
Payer: COMMERCIAL

## 2024-03-13 ENCOUNTER — TELEPHONE (OUTPATIENT)
Dept: FAMILY MEDICINE | Facility: CLINIC | Age: 79
End: 2024-03-13
Payer: COMMERCIAL

## 2024-03-13 NOTE — TELEPHONE ENCOUNTER
Completed form has been faxed to [phone] . Right-Fax reviewed to confirm form was sent without error.[facility].

## 2024-03-13 NOTE — TELEPHONE ENCOUNTER
Forms/Letter Request    Type of form/letter: OTHER: [employer]        Do we have the form/letter: Yes: Will place form in providers bin    Where did/will the form come from? form was faxed in  When is form/letter needed by: asap    How would you like the form/letter returned: Fax : [phone]

## 2024-04-08 DIAGNOSIS — E11.42 DIABETIC POLYNEUROPATHY ASSOCIATED WITH TYPE 2 DIABETES MELLITUS (H): ICD-10-CM

## 2024-04-08 RX ORDER — HYDROCODONE BITARTRATE AND ACETAMINOPHEN 5; 325 MG/1; MG/1
1 TABLET ORAL 3 TIMES DAILY PRN
Qty: 50 TABLET | Refills: 0 | Status: SHIPPED | OUTPATIENT
Start: 2024-04-08 | End: 2024-05-14

## 2024-04-08 NOTE — TELEPHONE ENCOUNTER
Medication Question or Refill    Contacts         Type Contact Phone/Fax    04/08/2024 01:03 PM CDT Phone (Incoming) Enrique Pruitt (Self) [phone] (M)            What medication are you calling about (include dose and sig)?: HYDROcodone-acetaminophen (NORCO) 5-325 MG tablet     Preferred Pharmacy:      [facility] #[id number] [address] AT [address] [address]  Phone: [phone] Fax: [phone]        Controlled Substance Agreement on file:   CSA -- Patient Level:     [Media Unavailable] Controlled Substance Agreement - Opioid - Scan on 6/13/2023  5:08 PM   [Media Unavailable] Controlled Substance Agreement - Opioid - Scan on 1/21/2022 12:19 PM   [Media Unavailable] Controlled Substance Agreement - Opioid - Scan on 12/6/2020  2:38 PM   [Media Unavailable] Controlled Substance Agreement - Opioid - Scan on 5/20/2019  6:59 AM       Who prescribed the medication?: Dr Peguero    Do you need a refill? Yes    When did you use the medication last?     Patient offered an appointment? No    Do you have any questions or concerns?  No      Okay to leave a detailed message?: Yes at Cell number on file:    Telephone Information:   Mobile [phone]     Shakira Robertson [facility]

## 2024-04-09 ENCOUNTER — ALLIED HEALTH/NURSE VISIT (OUTPATIENT)
Dept: AUDIOLOGY | Facility: CLINIC | Age: 79
End: 2024-04-09
Payer: COMMERCIAL

## 2024-04-09 DIAGNOSIS — H90.3 SENSORINEURAL HEARING LOSS, BILATERAL: Primary | ICD-10-CM

## 2024-04-09 PROCEDURE — V5299 HEARING SERVICE: HCPCS | Performed by: AUDIOLOGIST

## 2024-04-09 NOTE — PROGRESS NOTES
HEARING AID RECHECK    Patient Name:  Enrique Pruitt    Patient Age:   79 year old    :  1945    Background:   Hearing aids and  were dropped off for repair.      Procedures:   Both hearing aids were discharged.  I charged them up and after charging the right aid seemed to work well, based on a listening check but there is no sound output in the left aid and charging seemed to not hold.    Plan:   I called patient's son and let him know that I was sending the left aid to [employer] for repair. I left the right aid and  at the [facility]  for pickup. Left voicemail message.  When the left side is returned, we will call them for pickup.    NO CHARGE VISIT    David Hlil MA, CCC-A  [place] Licensed Audiologist #[id number]  2024

## 2024-04-23 ENCOUNTER — TELEPHONE (OUTPATIENT)
Dept: AUDIOLOGY | Facility: CLINIC | Age: 79
End: 2024-04-23
Payer: COMMERCIAL

## 2024-04-23 DIAGNOSIS — H90.3 SENSORINEURAL HEARING LOSS, BILATERAL: Primary | ICD-10-CM

## 2024-04-23 PROCEDURE — 92592 PR HEARING AID CHECK, MONAURAL: CPT | Performed by: AUDIOLOGIST

## 2024-04-23 NOTE — TELEPHONE ENCOUNTER
HEARING AID RECHECK    Patient Name:  Enrique Pruitt    Patient Age:   79 year old    :  1945    Background:   Patient's hearing aid was returned after factory repair    SIDE: Left    : [employer]    TYPE: [place] P70R    S/N: [id number]    WARRANTY: 3/29/26    Procedures:   Placed aid in  for a few minutes, then did a listening check to verify good gain and sound quality.    Plan:   Patient will  aid at  in [place].      David Hill MA, CCC-A  [place] Licensed Audiologist #[id number]  2024

## 2024-04-24 DIAGNOSIS — K21.9 GASTROESOPHAGEAL REFLUX DISEASE WITHOUT ESOPHAGITIS: ICD-10-CM

## 2024-04-28 DIAGNOSIS — E11.42 TYPE 2 DIABETES MELLITUS WITH DIABETIC POLYNEUROPATHY, WITHOUT LONG-TERM CURRENT USE OF INSULIN (H): ICD-10-CM

## 2024-04-28 DIAGNOSIS — E78.5 HYPERLIPIDEMIA LDL GOAL <70: ICD-10-CM

## 2024-04-29 RX ORDER — SIMVASTATIN 20 MG
20 TABLET ORAL DAILY
Qty: 90 TABLET | Refills: 0 | Status: SHIPPED | OUTPATIENT
Start: 2024-04-29 | End: 2024-06-26

## 2024-05-14 DIAGNOSIS — E11.42 DIABETIC POLYNEUROPATHY ASSOCIATED WITH TYPE 2 DIABETES MELLITUS (H): ICD-10-CM

## 2024-05-14 RX ORDER — HYDROCODONE BITARTRATE AND ACETAMINOPHEN 5; 325 MG/1; MG/1
1 TABLET ORAL 3 TIMES DAILY PRN
Qty: 50 TABLET | Refills: 0 | Status: SHIPPED | OUTPATIENT
Start: 2024-05-14 | End: 2024-06-18

## 2024-05-14 NOTE — TELEPHONE ENCOUNTER
Medication Question or Refill        What medication are you calling about (include dose and sig)?: HYDROcodone- acetaminophen 5/325mg    Preferred Pharmacy:       [facility] #[id number] - [place], [address] AT [address] [place]  [place] [address]  Phone: [phone] Fax: [phone]        Controlled Substance Agreement on file:   CSA -- Patient Level:     [Media Unavailable] Controlled Substance Agreement - Opioid - Scan on 6/13/2023  5:08 PM   [Media Unavailable] Controlled Substance Agreement - Opioid - Scan on 1/21/2022 12:19 PM   [Media Unavailable] Controlled Substance Agreement - Opioid - Scan on 12/6/2020  2:38 PM   [Media Unavailable] Controlled Substance Agreement - Opioid - Scan on 5/20/2019  6:59 AM       Who prescribed the medication?: Lana Pegureo    Do you need a refill? Yes    When did you use the medication last? Last month    Patient offered an appointment? No    Do you have any questions or concerns?  No      Okay to leave a detailed message?: Yes at Cell number on file:    Telephone Information:   Mobile [phone]

## 2024-06-18 ENCOUNTER — TELEPHONE (OUTPATIENT)
Dept: FAMILY MEDICINE | Facility: CLINIC | Age: 79
End: 2024-06-18
Payer: COMMERCIAL

## 2024-06-18 DIAGNOSIS — E11.42 DIABETIC POLYNEUROPATHY ASSOCIATED WITH TYPE 2 DIABETES MELLITUS (H): ICD-10-CM

## 2024-06-18 RX ORDER — HYDROCODONE BITARTRATE AND ACETAMINOPHEN 5; 325 MG/1; MG/1
1 TABLET ORAL 3 TIMES DAILY PRN
Qty: 50 TABLET | Refills: 0 | Status: SHIPPED | OUTPATIENT
Start: 2024-06-18 | End: 2024-06-26

## 2024-06-18 RX ORDER — HYDROCODONE BITARTRATE AND ACETAMINOPHEN 5; 325 MG/1; MG/1
1 TABLET ORAL 3 TIMES DAILY PRN
Qty: 50 TABLET | Refills: 0 | OUTPATIENT
Start: 2024-06-18

## 2024-06-18 NOTE — TELEPHONE ENCOUNTER
Daughter called - Need for HYDROcodone-acetaminophen (NORCO) 5-325 MG tablet refill has been requested.     Patient is scheduled for an office appointment on Wed 6/26 . Used a same day as she has not been seen since 1/10/2024. I that OK?    Daughter stated need to review all her meds as refills will be needed soon.

## 2024-06-18 NOTE — TELEPHONE ENCOUNTER
Patient's daughter calling. Need all of her medication refilled.     Currently need -   HYDROcodone-acetaminophen (NORCO) 5-325 MG tablet     Wait until after appointment on Wed 6/25 with Dr Peguero  amLODIPine (NORVASC) 10 MG tablet  triamcinolone (KENALOG) 0.1 % external cream   simvastatin (ZOCOR) 20 MG tablet   metFORMIN (GLUCOPHAGE) 500 MG tablet     sitagliptin (JANUVIA) 50 MG tablet     glimepiride (AMARYL) 4 MG tablet     pioglitazone (ACTOS) 45 MG tablet     pregabalin (LYRICA) 100 MG capsule     blood glucose (NO BRAND SPECIFIED) test strip   thin (NO BRAND SPECIFIED) [facility]       Ph[id number] in [place]

## 2024-06-26 ENCOUNTER — TELEPHONE (OUTPATIENT)
Dept: FAMILY MEDICINE | Facility: CLINIC | Age: 79
End: 2024-06-26

## 2024-06-26 ENCOUNTER — OFFICE VISIT (OUTPATIENT)
Dept: FAMILY MEDICINE | Facility: CLINIC | Age: 79
End: 2024-06-26
Payer: COMMERCIAL

## 2024-06-26 VITALS
DIASTOLIC BLOOD PRESSURE: 81 MMHG | WEIGHT: 126 LBS | OXYGEN SATURATION: 99 % | RESPIRATION RATE: 20 BRPM | SYSTOLIC BLOOD PRESSURE: 133 MMHG | HEART RATE: 104 BPM | TEMPERATURE: 98.4 F | BODY MASS INDEX: 27.18 KG/M2

## 2024-06-26 DIAGNOSIS — E11.42 TYPE 2 DIABETES MELLITUS WITH DIABETIC POLYNEUROPATHY, WITHOUT LONG-TERM CURRENT USE OF INSULIN (H): Primary | ICD-10-CM

## 2024-06-26 DIAGNOSIS — E78.5 HYPERLIPIDEMIA LDL GOAL <70: ICD-10-CM

## 2024-06-26 DIAGNOSIS — G89.4 CHRONIC PAIN SYNDROME: ICD-10-CM

## 2024-06-26 DIAGNOSIS — I10 HYPERTENSION GOAL BP (BLOOD PRESSURE) < 140/90: ICD-10-CM

## 2024-06-26 DIAGNOSIS — I10 ESSENTIAL HYPERTENSION WITH GOAL BLOOD PRESSURE LESS THAN 140/90: ICD-10-CM

## 2024-06-26 DIAGNOSIS — E11.42 DIABETIC POLYNEUROPATHY ASSOCIATED WITH TYPE 2 DIABETES MELLITUS (H): ICD-10-CM

## 2024-06-26 PROBLEM — N18.2 CKD (CHRONIC KIDNEY DISEASE) STAGE 2, GFR 60-89 ML/MIN: Status: RESOLVED | Noted: 2019-09-23 | Resolved: 2024-06-26

## 2024-06-26 LAB — HBA1C MFR BLD: 12 % (ref 0–5.6)

## 2024-06-26 PROCEDURE — 80053 COMPREHEN METABOLIC PANEL: CPT | Performed by: FAMILY MEDICINE

## 2024-06-26 PROCEDURE — 36415 COLL VENOUS BLD VENIPUNCTURE: CPT | Performed by: FAMILY MEDICINE

## 2024-06-26 PROCEDURE — 82043 UR ALBUMIN QUANTITATIVE: CPT | Performed by: FAMILY MEDICINE

## 2024-06-26 PROCEDURE — 80061 LIPID PANEL: CPT | Performed by: FAMILY MEDICINE

## 2024-06-26 PROCEDURE — 83036 HEMOGLOBIN GLYCOSYLATED A1C: CPT | Performed by: FAMILY MEDICINE

## 2024-06-26 PROCEDURE — 99214 OFFICE O/P EST MOD 30 MIN: CPT | Performed by: FAMILY MEDICINE

## 2024-06-26 PROCEDURE — 80307 DRUG TEST PRSMV CHEM ANLYZR: CPT | Performed by: FAMILY MEDICINE

## 2024-06-26 PROCEDURE — 82570 ASSAY OF URINE CREATININE: CPT | Performed by: FAMILY MEDICINE

## 2024-06-26 RX ORDER — SIMVASTATIN 20 MG
20 TABLET ORAL DAILY
Qty: 90 TABLET | Refills: 1 | Status: SHIPPED | OUTPATIENT
Start: 2024-06-26

## 2024-06-26 RX ORDER — PIOGLITAZONEHYDROCHLORIDE 45 MG/1
45 TABLET ORAL DAILY
Qty: 90 TABLET | Refills: 1 | Status: SHIPPED | OUTPATIENT
Start: 2024-06-26

## 2024-06-26 RX ORDER — GLIMEPIRIDE 4 MG/1
4 TABLET ORAL 2 TIMES DAILY
Qty: 180 TABLET | Refills: 1 | Status: SHIPPED | OUTPATIENT
Start: 2024-06-26

## 2024-06-26 RX ORDER — AMLODIPINE BESYLATE 10 MG/1
10 TABLET ORAL DAILY
Qty: 90 TABLET | Refills: 1 | Status: SHIPPED | OUTPATIENT
Start: 2024-06-26

## 2024-06-26 RX ORDER — HYDROCODONE BITARTRATE AND ACETAMINOPHEN 5; 325 MG/1; MG/1
1 TABLET ORAL 3 TIMES DAILY PRN
Qty: 50 TABLET | Refills: 0 | Status: SHIPPED | OUTPATIENT
Start: 2024-07-16 | End: 2024-07-19

## 2024-06-26 RX ORDER — PREGABALIN 100 MG/1
100 CAPSULE ORAL 2 TIMES DAILY
Qty: 180 CAPSULE | Refills: 1 | Status: SHIPPED | OUTPATIENT
Start: 2024-06-26

## 2024-06-26 ASSESSMENT — PATIENT HEALTH QUESTIONNAIRE - PHQ9
SUM OF ALL RESPONSES TO PHQ QUESTIONS 1-9: 2
SUM OF ALL RESPONSES TO PHQ QUESTIONS 1-9: 2
10. IF YOU CHECKED OFF ANY PROBLEMS, HOW DIFFICULT HAVE THESE PROBLEMS MADE IT FOR YOU TO DO YOUR WORK, TAKE CARE OF THINGS AT HOME, OR GET ALONG WITH OTHER PEOPLE: NOT DIFFICULT AT ALL

## 2024-06-26 ASSESSMENT — ANXIETY QUESTIONNAIRES
1. FEELING NERVOUS, ANXIOUS, OR ON EDGE: NOT AT ALL
8. IF YOU CHECKED OFF ANY PROBLEMS, HOW DIFFICULT HAVE THESE MADE IT FOR YOU TO DO YOUR WORK, TAKE CARE OF THINGS AT HOME, OR GET ALONG WITH OTHER PEOPLE?: EXTREMELY DIFFICULT
6. BECOMING EASILY ANNOYED OR IRRITABLE: NOT AT ALL
7. FEELING AFRAID AS IF SOMETHING AWFUL MIGHT HAPPEN: NOT AT ALL
7. FEELING AFRAID AS IF SOMETHING AWFUL MIGHT HAPPEN: NOT AT ALL
GAD7 TOTAL SCORE: 0
3. WORRYING TOO MUCH ABOUT DIFFERENT THINGS: NOT AT ALL
IF YOU CHECKED OFF ANY PROBLEMS ON THIS QUESTIONNAIRE, HOW DIFFICULT HAVE THESE PROBLEMS MADE IT FOR YOU TO DO YOUR WORK, TAKE CARE OF THINGS AT HOME, OR GET ALONG WITH OTHER PEOPLE: EXTREMELY DIFFICULT
2. NOT BEING ABLE TO STOP OR CONTROL WORRYING: NOT AT ALL
GAD7 TOTAL SCORE: 0
GAD7 TOTAL SCORE: 0
5. BEING SO RESTLESS THAT IT IS HARD TO SIT STILL: NOT AT ALL
4. TROUBLE RELAXING: NOT AT ALL

## 2024-06-26 ASSESSMENT — PAIN SCALES - GENERAL: PAINLEVEL: SEVERE PAIN (6)

## 2024-06-26 NOTE — LETTER

## 2024-06-26 NOTE — LETTER
July 1, 2024      Enrique Pruitt  [address]  [place] [address]        Hi, gucinthia,   Unfortunately Enrique's cholesterol and sugar levels are way way way too high.  I suspect she has not been taking her medication.  Please let me know if that is the case and if there is something we can do to help with this.  Is it simply talking to her about the importance or there are issues with affording medication?     Resulted Orders   Lipid panel reflex to direct LDL Non-fasting   Result Value Ref Range    Cholesterol 256 (H) <200 mg/dL    Triglycerides 243 (H) <150 mg/dL    Direct Measure HDL 56 >=50 mg/dL    LDL Cholesterol Calculated 151 (H) <=100 mg/dL    Non HDL Cholesterol 200 (H) <130 mg/dL    Patient Fasting > 8hrs? Yes     Narrative    Cholesterol  Desirable:  <200 mg/dL    Triglycerides  Normal:  Less than 150 mg/dL  Borderline High:  150-199 mg/dL  High:  200-499 mg/dL  Very High:  Greater than or equal to 500 mg/dL    Direct Measure HDL  Female:  Greater than or equal to 50 mg/dL   Male:  Greater than or equal to 40 mg/dL    LDL Cholesterol  Desirable:  <100mg/dL  Above Desirable:  100-129 mg/dL   Borderline High:  130-159 mg/dL   High:  160-189 mg/dL   Very High:  >= 190 mg/dL    Non HDL Cholesterol  Desirable:  130 mg/dL  Above Desirable:  130-159 mg/dL  Borderline High:  160-189 mg/dL  High:  190-219 mg/dL  Very High:  Greater than or equal to 220 mg/dL   Comprehensive metabolic panel   Result Value Ref Range    Sodium 137 135 - 145 mmol/L    Potassium 4.3 3.4 - 5.3 mmol/L    Carbon Dioxide (CO2) 26 22 - 29 mmol/L    Anion Gap 11 7 - 15 mmol/L    Urea Nitrogen 8.5 8.0 - 23.0 mg/dL    Creatinine 0.63 0.51 - 0.95 mg/dL    GFR Estimate 89 >60 mL/min/1.73m2      Comment:      eGFR calculated using 2021 CKD-EPI equation.    Calcium 9.3 8.8 - 10.2 mg/dL    Chloride 100 98 - 107 mmol/L    Glucose 261 (H) 70 - 99 mg/dL    Alkaline Phosphatase 139 40 - 150 U/L    AST 13 0 - 45 U/L      Comment:       Reference intervals for this test were updated on 6/12/2023 to more accurately reflect our healthy population. There may be differences in the flagging of prior results with similar values performed with this method. Interpretation of those prior results can be made in the context of the updated reference intervals.    ALT 8 0 - 50 U/L      Comment:      Reference intervals for this test were updated on 6/12/2023 to more accurately reflect our healthy population. There may be differences in the flagging of prior results with similar values performed with this method. Interpretation of those prior results can be made in the context of the updated reference intervals.      Protein Total 7.1 6.4 - 8.3 g/dL    Albumin 4.2 3.5 - 5.2 g/dL    Bilirubin Total 0.5 <=1.2 mg/dL    Patient Fasting > 8hrs? Yes    Hemoglobin A1c   Result Value Ref Range    Hemoglobin A1C 12.0 (H) 0.0 - 5.6 %      Comment:      Normal <5.7%   Prediabetes 5.7-6.4%    Diabetes 6.5% or higher     Note: Adopted from ADA consensus guidelines.   Albumin Random Urine Quantitative with Creat Ratio   Result Value Ref Range    Creatinine Urine mg/dL 75.0 mg/dL      Comment:      The reference ranges have not been established in urine creatinine. The results should be integrated into the clinical context for interpretation.    Albumin Urine mg/L 249.0 mg/L      Comment:      The reference ranges have not been established in urine albumin. The results should be integrated into the clinical context for interpretation.    Albumin Urine mg/g Cr 332.00 (H) 0.00 - 25.00 mg/g Cr      Comment:      Microalbuminuria is defined as an albumin:creatinine ratio of 17 to 299 for males and 25 to 299 for females. A ratio of albumin:creatinine of 300 or higher is indicative of overt proteinuria.  Due to biologic variability, positive results should be confirmed by a second, first-morning random or 24-hour timed urine specimen. If there is discrepancy, a third specimen is  recommended. When 2 out of 3 results are in the microalbuminuria range, this is evidence for incipient nephropathy and warrants increased efforts at glucose control, blood pressure control, and institution of therapy with an angiotensin-converting-enzyme (ACE) inhibitor (if the patient can tolerate it).     Urine Drug Screen Panel   Result Value Ref Range    Amphetamines Urine Screen Negative Screen Negative      Comment:      Cutoff for a negative amphetamine is less than 500 ng/mL.    Barbituates Urine Screen Negative Screen Negative      Comment:      Cutoff for a negative barbiturate is less than 200 ng/mL.    Benzodiazepine Urine Screen Negative Screen Negative      Comment:      Cutoff for a negative benzodiazepine is less than 100 ng/mL.    Cannabinoids Urine Screen Negative Screen Negative      Comment:      Cutoff for a negative cannabinoid is less than 50 ng/mL.    Cocaine Urine Screen Negative Screen Negative      Comment:      Cutoff for a negative cocaine is less than 300 ng/mL.    Fentanyl Qual Urine Screen Negative Screen Negative      Comment:      Cutoff for negative fentanyl is less than 5 ng/mL.    Opiates Urine Screen Negative Screen Negative      Comment:      Cutoff for a negative opiate is less than 300 ng/mL.    PCP Urine Screen Negative Screen Negative      Comment:      Cutoff for a negative PCP is less than 25 ng/mL.       If you have any questions or concerns, please call the clinic at the number listed above.       Sincerely,      Lana Peguero MD

## 2024-06-26 NOTE — PROGRESS NOTES
Assessment & Plan     Type 2 diabetes mellitus with diabetic polyneuropathy, without long-term current use of insulin (H)  A1c has not been well-controlled.  Due for full recheck and adjust as needed  - Lipid panel reflex to direct LDL Non-fasting; Future  - Comprehensive metabolic panel; Future  - Hemoglobin A1c; Future  - Albumin Random Urine Quantitative with Creat Ratio; Future  - glimepiride (AMARYL) 4 MG tablet; Take 1 tablet (4 mg) by mouth 2 times daily  - metFORMIN (GLUCOPHAGE) 500 MG tablet; Take 2 tablets (1,000 mg) by mouth 2 times daily (with meals)  - pioglitazone (ACTOS) 45 MG tablet; Take 1 tablet (45 mg) by mouth daily  - sitagliptin (JANUVIA) 50 MG tablet; Take 1 tablet (50 mg) by mouth daily    Hypertension goal BP (blood pressure) < 140/90  Stable on current regimen.  Continue same plan and routine follow-up.   - amLODIPine (NORVASC) 10 MG tablet; Take 1 tablet (10 mg) by mouth daily    Hyperlipidemia LDL goal <70  Recheck and adjust as needed  - simvastatin (ZOCOR) 20 MG tablet; Take 1 tablet (20 mg) by mouth daily    Diabetic polyneuropathy associated with type 2 diabetes mellitus (H)  Stable and following  - HYDROcodone-acetaminophen (NORCO) 5-325 MG tablet; Take 1 tablet by mouth 3 times daily as needed for severe pain  - pregabalin (LYRICA) 100 MG capsule; Take 1 capsule (100 mg) by mouth 2 times daily    Chronic pain syndrome  Longstanding issue.  Updating urine drug screen and controlled substance agreement today  - Urine Drug Screen; Future            Regular exercise  See Patient Instructions    Nalini Nunez is a 79 year old, presenting for the following health issues:  Diabetes and Recheck Medication        6/26/2024    10:05 AM   Additional Questions   Roomed by Zita AZAR   Accompanied by Daughter  Shanika     History of Present Illness       Reason for visit:  For up    She eats 4 or more servings of fruits and vegetables daily.She consumes 4 sweetened beverage(s) daily.She  exercises with enough effort to increase her heart rate 9 or less minutes per day.  She exercises with enough effort to increase her heart rate 3 or less days per week. She is missing 2 dose(s) of medications per week.           Here today in follow-up of diabetes, hypertension, lipids, chronic pain  Actually doing okay      Review of Systems  Constitutional, HEENT, cardiovascular, pulmonary, gi and gu systems are negative, except as otherwise noted.      Objective    /81 (BP Location: Right arm, Patient Position: Sitting, Cuff Size: Adult Regular)   Pulse 104   Temp 98.4  F (36.9  C) (Oral)   Resp 20   Wt 57.2 kg (126 lb)   LMP  (LMP Unknown)   SpO2 99%   BMI 27.18 kg/m    Body mass index is 27.18 kg/m .  Physical Exam   Alert, pleasant, upbeat, and in no apparent discomfort.  S1 and S2 normal, no murmurs, clicks, gallops or rubs. Regular rate and rhythm. Chest is clear; no wheezes or rales. No edema or JVD.  Past labs reviewed with the patient.             Signed Electronically by: Lana Peguero MD

## 2024-06-27 LAB
ALBUMIN SERPL BCG-MCNC: 4.2 G/DL (ref 3.5–5.2)
ALP SERPL-CCNC: 139 U/L (ref 40–150)
ALT SERPL W P-5'-P-CCNC: 8 U/L (ref 0–50)
AMPHETAMINES UR QL SCN: NORMAL
ANION GAP SERPL CALCULATED.3IONS-SCNC: 11 MMOL/L (ref 7–15)
AST SERPL W P-5'-P-CCNC: 13 U/L (ref 0–45)
BARBITURATES UR QL SCN: NORMAL
BENZODIAZ UR QL SCN: NORMAL
BILIRUB SERPL-MCNC: 0.5 MG/DL
BUN SERPL-MCNC: 8.5 MG/DL (ref 8–23)
BZE UR QL SCN: NORMAL
CALCIUM SERPL-MCNC: 9.3 MG/DL (ref 8.8–10.2)
CANNABINOIDS UR QL SCN: NORMAL
CHLORIDE SERPL-SCNC: 100 MMOL/L (ref 98–107)
CHOLEST SERPL-MCNC: 256 MG/DL
CREAT SERPL-MCNC: 0.63 MG/DL (ref 0.51–0.95)
CREAT UR-MCNC: 75 MG/DL
DEPRECATED HCO3 PLAS-SCNC: 26 MMOL/L (ref 22–29)
EGFRCR SERPLBLD CKD-EPI 2021: 89 ML/MIN/1.73M2
FASTING STATUS PATIENT QL REPORTED: YES
FASTING STATUS PATIENT QL REPORTED: YES
FENTANYL UR QL: NORMAL
GLUCOSE SERPL-MCNC: 261 MG/DL (ref 70–99)
HDLC SERPL-MCNC: 56 MG/DL
LDLC SERPL CALC-MCNC: 151 MG/DL
MICROALBUMIN UR-MCNC: 249 MG/L
MICROALBUMIN/CREAT UR: 332 MG/G CR (ref 0–25)
NONHDLC SERPL-MCNC: 200 MG/DL
OPIATES UR QL SCN: NORMAL
PCP QUAL URINE (ROCHE): NORMAL
POTASSIUM SERPL-SCNC: 4.3 MMOL/L (ref 3.4–5.3)
PROT SERPL-MCNC: 7.1 G/DL (ref 6.4–8.3)
SODIUM SERPL-SCNC: 137 MMOL/L (ref 135–145)
TRIGL SERPL-MCNC: 243 MG/DL

## 2024-07-01 NOTE — RESULT ENCOUNTER NOTE
Please mail results and note to patient:    marie Ward,  Unfortunately Enrique's cholesterol and sugar levels are way way way too high.  I suspect she has not been taking her medication.  Please let me know if that is the case and if there is something we can do to help with this.  Is it simply talking to her about the importance or there are issues with affording medication?  TIMMY Peguero M.D.

## 2024-07-08 ENCOUNTER — PATIENT OUTREACH (OUTPATIENT)
Dept: GERIATRIC MEDICINE | Facility: CLINIC | Age: 79
End: 2024-07-08
Payer: COMMERCIAL

## 2024-07-08 NOTE — PROGRESS NOTES
[facility] Care Coordination Contact      [facility] Six-Month Telephone Assessment    6 month telephone assessment completed on 7/8/2024.    ER visits: No  Hospitalizations: No  TCU stays: No  Significant health status changes: None reported  Falls/Injuries: No  ADL/IADL changes: No  Changes in services: No    Caregiver Assessment follow up:  N/A    Goals: See POC in chart for goal progress documentation.  Six month follow-up call completed with member's daughter, Jaleel. Daughter states no falls, ED or hospitalization and member's current support/services are meeting needs. Care Plan reviewed and updated.     Will see member in 6 months for an annual health risk assessment.   Encouraged member to call CC with any questions or concerns in the meantime.     Regine Stapleton RN, PHN  [facility]  [phone]

## 2024-07-19 ENCOUNTER — TELEPHONE (OUTPATIENT)
Dept: FAMILY MEDICINE | Facility: CLINIC | Age: 79
End: 2024-07-19
Payer: COMMERCIAL

## 2024-07-19 DIAGNOSIS — E11.42 DIABETIC POLYNEUROPATHY ASSOCIATED WITH TYPE 2 DIABETES MELLITUS (H): ICD-10-CM

## 2024-07-19 RX ORDER — HYDROCODONE BITARTRATE AND ACETAMINOPHEN 5; 325 MG/1; MG/1
1 TABLET ORAL 3 TIMES DAILY PRN
Qty: 50 TABLET | Refills: 0 | Status: SHIPPED | OUTPATIENT
Start: 2024-07-19 | End: 2024-08-13

## 2024-07-19 NOTE — TELEPHONE ENCOUNTER
Medication Question or Refill        What medication are you calling about (include dose and sig)?: HYDROcodone-acetaminophen (NORCO) 5-325 MG tablet     Medication and Pharmacy Pended     Preferred Pharmacy:       [facility] #[id number] - [place] [address] AT [address]  [place] [address]  Phone: [phone] Fax: [phone]      Controlled Substance Agreement on file:   CSA -- Patient Level:     [Media Unavailable] Controlled Substance Agreement - Opioid - Scan on 6/27/2024 10:29 AM: Controlled Subastance Agreement 2024   [Media Unavailable] Controlled Substance Agreement - Opioid - Scan on 6/13/2023  5:08 PM   [Media Unavailable] Controlled Substance Agreement - Opioid - Scan on 1/21/2022 12:19 PM   [Media Unavailable] Controlled Substance Agreement - Opioid - Scan on 12/6/2020  2:38 PM   [Media Unavailable] Controlled Substance Agreement - Opioid - Scan on 5/20/2019  6:59 AM       Who prescribed the medication?:   Lana Peguero   Do you need a refill? Yes    When did you use the medication last? 7/13/24    Patient offered an appointment? No    Do you have any questions or concerns?  No      Okay to leave a detailed message?: Yes at Home number on file [phone] (home)

## 2024-08-13 DIAGNOSIS — E11.42 DIABETIC POLYNEUROPATHY ASSOCIATED WITH TYPE 2 DIABETES MELLITUS (H): ICD-10-CM

## 2024-08-13 RX ORDER — HYDROCODONE BITARTRATE AND ACETAMINOPHEN 5; 325 MG/1; MG/1
1 TABLET ORAL 3 TIMES DAILY PRN
Qty: 50 TABLET | Refills: 0 | Status: SHIPPED | OUTPATIENT
Start: 2024-08-15 | End: 2024-09-20

## 2024-08-14 ENCOUNTER — ALLIED HEALTH/NURSE VISIT (OUTPATIENT)
Dept: AUDIOLOGY | Facility: CLINIC | Age: 79
End: 2024-08-14
Payer: COMMERCIAL

## 2024-08-14 DIAGNOSIS — H90.3 SENSORINEURAL HEARING LOSS, BILATERAL: Primary | ICD-10-CM

## 2024-08-14 PROCEDURE — V5299 HEARING SERVICE: HCPCS | Performed by: AUDIOLOGIST

## 2024-08-15 NOTE — PROGRESS NOTES
HEARING AID DROP-OFF     Background:              Patient dropped off their hearing aid with the report of hearing aid not working                             SIDE: bilateral                          : [employer]                          TYPE: [place] P70R                          S/N: [id number] (L), [id number] (R)                          WARRANTY: 3/26/26     Procedures:              Initially the hearing aid was weak sounding, but after vacuuming the microphone and  ports there was good sound quality.      Plan:              Patient was contacted in regards to status of hearing aid dropped off today.  A telephone message was left utilizing [employer] ID#:[id number] to have the hearing aid picked up at the  in [place].      Charges- hearing aid cleaning ($30)   Татьяна Sung.   Doctor of Audiology  License #[id number]

## 2024-08-16 DIAGNOSIS — L30.9 DERMATITIS: ICD-10-CM

## 2024-08-16 DIAGNOSIS — E11.42 TYPE 2 DIABETES MELLITUS WITH DIABETIC POLYNEUROPATHY, WITHOUT LONG-TERM CURRENT USE OF INSULIN (H): ICD-10-CM

## 2024-08-19 RX ORDER — TRIAMCINOLONE ACETONIDE 1 MG/G
CREAM TOPICAL
Qty: 60 G | Refills: 0 | Status: SHIPPED | OUTPATIENT
Start: 2024-08-19

## 2024-08-19 RX ORDER — LANCETS
EACH MISCELLANEOUS
Qty: 200 EACH | Refills: 2 | Status: SHIPPED | OUTPATIENT
Start: 2024-08-19 | End: 2024-09-25

## 2024-09-20 DIAGNOSIS — E11.42 TYPE 2 DIABETES MELLITUS WITH DIABETIC POLYNEUROPATHY, WITHOUT LONG-TERM CURRENT USE OF INSULIN (H): ICD-10-CM

## 2024-09-20 DIAGNOSIS — E11.42 DIABETIC POLYNEUROPATHY ASSOCIATED WITH TYPE 2 DIABETES MELLITUS (H): ICD-10-CM

## 2024-09-20 RX ORDER — HYDROCODONE BITARTRATE AND ACETAMINOPHEN 5; 325 MG/1; MG/1
1 TABLET ORAL 3 TIMES DAILY PRN
Qty: 50 TABLET | Refills: 0 | Status: SHIPPED | OUTPATIENT
Start: 2024-09-20

## 2024-09-20 NOTE — TELEPHONE ENCOUNTER
Medication Question or Refill    Contacts       Contact Date/Time Type Contact Phone/Fax    09/20/2024 11:22 AM CDT Phone (Incoming) KmkassidyPresley ulrichphone (Emergency Contact) [phone] (H)            What medication are you calling about (include dose and sig)?: HYDROcodone-acetaminophen (NORCO) 5-325 MG tablet and     blood glucose (NO BRAND SPECIFIED) test strip       Preferred Pharmacy:  [facility] #[id number] [address] [address]  Phone: [phone] Fax: [phone]        Controlled Substance Agreement on file:   CSA -- Patient Level:     [Media Unavailable] Controlled Substance Agreement - Opioid - Scan on 6/27/2024 10:29 AM: Controlled Subastance Agreement 2024   [Media Unavailable] Controlled Substance Agreement - Opioid - Scan on 6/13/2023  5:08 PM   [Media Unavailable] Controlled Substance Agreement - Opioid - Scan on 1/21/2022 12:19 PM   [Media Unavailable] Controlled Substance Agreement - Opioid - Scan on 12/6/2020  2:38 PM   [Media Unavailable] Controlled Substance Agreement - Opioid - Scan on 5/20/2019  6:59 AM       Who prescribed the medication?: Lana Peguero     Do you need a refill? Yes

## 2024-09-25 ENCOUNTER — TELEPHONE (OUTPATIENT)
Dept: FAMILY MEDICINE | Facility: CLINIC | Age: 79
End: 2024-09-25
Payer: COMMERCIAL

## 2024-09-25 DIAGNOSIS — E11.42 TYPE 2 DIABETES MELLITUS WITH DIABETIC POLYNEUROPATHY, WITHOUT LONG-TERM CURRENT USE OF INSULIN (H): ICD-10-CM

## 2024-09-25 NOTE — TELEPHONE ENCOUNTER
Left message for daughter to call back.     RN, if/when daughter calls back, please gather additional information on request per refill nurse.       Kvng Neely, RN, BSN

## 2024-09-25 NOTE — TELEPHONE ENCOUNTER
Daughter reporting that patient needs the following new Diabetic supplies:    Glucose monitor  Test strips  [place]    Send to [facility] in [facility]

## 2024-09-26 RX ORDER — LANCETS
EACH MISCELLANEOUS
Qty: 200 EACH | Refills: 2 | Status: SHIPPED | OUTPATIENT
Start: 2024-09-26

## 2024-10-15 DIAGNOSIS — E11.42 DIABETIC POLYNEUROPATHY ASSOCIATED WITH TYPE 2 DIABETES MELLITUS (H): ICD-10-CM

## 2024-10-15 RX ORDER — HYDROCODONE BITARTRATE AND ACETAMINOPHEN 5; 325 MG/1; MG/1
1 TABLET ORAL 3 TIMES DAILY PRN
Qty: 50 TABLET | Refills: 0 | Status: SHIPPED | OUTPATIENT
Start: 2024-10-15

## 2024-10-15 NOTE — TELEPHONE ENCOUNTER
Reason for Call:  Medication or medication refill:    Do you use a [facility]?  Name of the pharmacy and phone number for the current request:  [facility] [place], [place] [address] [facility]     Name of the medication requested:   HYDROcodone-acetaminophen (NORCO) 5-325 MG tablet   Other request:     Can we leave a detailed message on this number? YES    Phone number patient can be reached at: Cell number on file:    Telephone Information:   Mobile [phone]       Best Time:     Call taken on 10/15/2024 at 1:14 PM by Makenzie Robbins

## 2024-10-21 NOTE — TELEPHONE ENCOUNTER
Attempted to contact patient to set up an appointment. No answer, left message to return call to [phone].    Dotty Fritz RN [facility]     This was a shared visit with the DIANA. I reviewed and verified the documentation.

## 2024-11-05 ENCOUNTER — PATIENT OUTREACH (OUTPATIENT)
Dept: GERIATRIC MEDICINE | Facility: CLINIC | Age: 79
End: 2024-11-05
Payer: COMMERCIAL

## 2024-11-05 NOTE — PROGRESS NOTES
[facility] Care Coordination Contact    Called adult daughter Enrique  to schedule annual HRA home visit. HRA has been scheduled for 11/11/24 at 10:30 am.    Regine Stapleton RN, PHN  [facility]  [phone]

## 2024-11-11 ENCOUNTER — PATIENT OUTREACH (OUTPATIENT)
Dept: GERIATRIC MEDICINE | Facility: CLINIC | Age: 79
End: 2024-11-11
Payer: COMMERCIAL

## 2024-11-11 ASSESSMENT — LIFESTYLE VARIABLES
SKIP TO QUESTIONS 9-10: 1
AUDIT-C TOTAL SCORE: 0

## 2024-11-11 NOTE — Clinical Note
Hi J CARLOS New completed an annual home visit with your patient, Sonal Nunez on 11/11/2024. Patient states her health is good and her main health concern is her chronic pain and neuroppathy. I reviewed and provided health education around her pain, Diabetes and cholesterol. Sonal will resume PCA, homemaker and monthly DME services.   Thank you,  Regine Stapleton RN, [facility] [phone]

## 2024-11-11 NOTE — PROGRESS NOTES
[facility] Care Coordination Contact    [facility] Home Visit Assessment     Home visit for Health Risk Assessment with Enrique Pruitt completed on November 11, 2024    Type of residence:: Private home - stairs  Current living arrangement:: I live in a private home with family     Assessment completed with:: Patient, Care Team Member, Family    Current Care Plan  Member currently receiving the following home care services:     Member currently receiving the following community resources: DME, Housekeeping/Chore Agency, PCA, Transportation Services      Medication Review  Medication reconciliation completed in Epic: Yes  Medication set-up & administration: Family/informal caregiver sets up weekly.  Family caregiver administers medications.  Medication Risk Assessment Medication (1 or more, place referral to MTM): N/A: No risk factors identified  MTM Referral Placed: No: No risk factors idenified    Mental/Behavioral Health   Depression Screening:   PHQ-2 Total Score (Adult) - Positive if 3 or more points; Administer PHQ-9 if positive: 0       Mental health DX:: No        Falls Assessment:   Fallen 2 or more times in the past year?: No   Any fall with injury in the past year?: No    ADL/IADL Dependencies:   Dependent ADLs:: Ambulation-walker, Ambulation-cane, Bathing, Dressing, Eating, Grooming, Incontinence, Transfers, Wheelchair-with assist, Toileting  Dependent IADLs:: Cleaning, Cooking, Laundry, Shopping, Meal Preparation, Medication Management, Money Management, Transportation, Incontinence    Health [facility]: Shared information regarding [employer]. Reviewed preventative health screening and health plan supplemental benefits/incentives. Reviewed medication disposal form.    PCA Assessment completed at visit: Yes Annual PCA assessment indicated 6.25 hours per day of PCA. This is the same as the previous assessment.     Elderly Waiver Eligibility:  Yes-will continue on EW    Care Plan & Recommendations: Annual assessment completed with Sonal and daughter, Pilo at member's home. Sonal is 79 years old,  and lives with her daughter and son in law. Sonal states her health has been stable and reports her health as good. Sonal's last A1C and LDL increased and CC provided health education to member/family at home visit. Sonal reports chronic pain and neuropathy has her main health concern. Member reports dependency with most IADLS/ADLS. Sonal will resume PCA, monthly DME and homemaker services. No falls, ED or hospitalization reported this last year.     [place].    Follow-Up Plan: Member informed of future contact, plan to f/u with member with a 6 month telephone assessment.  Contact information shared with member and family, encouraged member to call with any questions or concerns at any time.    [place] care continuum providers: Please see Snapshot and Care Management Flowsheets for Specific details of care plan.    This CC note routed to PCP, Lana Peguero.    Regine Stapleton RN, PHN  [facility]  [phone]

## 2024-11-19 ENCOUNTER — PATIENT OUTREACH (OUTPATIENT)
Dept: GERIATRIC MEDICINE | Facility: CLINIC | Age: 79
End: 2024-11-19
Payer: COMMERCIAL

## 2024-11-19 DIAGNOSIS — E11.42 DIABETIC POLYNEUROPATHY ASSOCIATED WITH TYPE 2 DIABETES MELLITUS (H): ICD-10-CM

## 2024-11-19 RX ORDER — HYDROCODONE BITARTRATE AND ACETAMINOPHEN 5; 325 MG/1; MG/1
1 TABLET ORAL 3 TIMES DAILY PRN
Qty: 50 TABLET | Refills: 0 | OUTPATIENT
Start: 2024-11-19

## 2024-11-19 NOTE — TELEPHONE ENCOUNTER
Reason for Call:  Medication or medication refill:    Do you use a [facility]?  Name of the pharmacy and phone number for the current request:  [facility] [place], [place] [address] [facility]     Name of the medication requested:     HYDROcodone-acetaminophen (NORCO) 5-325 MG tablet       Other request:     Can we leave a detailed message on this number? YES    Phone number patient can be reached at: Cell number on file:    Telephone Information:   Mobile [phone]       Best Time:     Call taken on 11/19/2024 at 1:46 PM by Makenzie Robbins

## 2024-11-19 NOTE — TELEPHONE ENCOUNTER
Refill denied to the pharmacy.   If patient schedules an appointment we can provide a sena refill.   Needs a pain follow-up

## 2024-11-19 NOTE — PROGRESS NOTES
[facility] Care Coordination Contact    [facility]:  Emailed required PCA documents to [facility].  Mailed copy of Assessment to member.     Mirna Justice  Care Management Specialist  [facility]  [phone]

## 2024-12-03 ENCOUNTER — PATIENT OUTREACH (OUTPATIENT)
Dept: GERIATRIC MEDICINE | Facility: CLINIC | Age: 79
End: 2024-12-03
Payer: COMMERCIAL

## 2024-12-03 NOTE — LETTER
December 3, 2024       ENRIQUE NEWBERRY  [address] [address]      Dear Enrique,    At [facility], we re dedicated to improving your health and wellness. Enclosed is the Support Plan developed with you on 11/11/2024. Please review the Support Plan carefully.    As a reminder, during your visit we talked about:   Ways to manage your physical and mental health   Using health care to maintain and improve your health    Your preventive care needs      Remember to contact your care coordinator if you:   Are hospitalized or plan to be hospitalized    Have a fall     Have a change in your physical or mental health   Need help finding support or services    If you have questions or don t agree with your Support Plan, call me at [phone]. You can also call me if your needs change. TTY users call the [place] Relay at 868 or 1-701.490.7958 (yjjrhw-ac-rcyjgc relay service).    Sincerely,       Regine Stapleton RN, PHN    E-mail:[email or web address]  Phone: [phone]    Care Manager  [facility]                [id number] accepted     (06/2024)                [address] [place] [address]  [phone]  fax [phone]  [facility]

## 2024-12-03 NOTE — PROGRESS NOTES
[facility] Care Coordination Contact    Received after visit chart from care coordinator.  Completed following tasks: Mailed copy of support plan to member, Mailed [place] Cho[place] signature sheet pages 3-4, Mailed Safe Medication Disposal , Submitted referrals/auths for Homemaking, and Updated services in Database   and Provider Signature - No Support Plan Shared:  Member indicates that they do not want their support plan shared with any EW providers.    Mirna Justice  Care Management Specialist  [facility]  [phone]

## 2024-12-05 ENCOUNTER — PATIENT OUTREACH (OUTPATIENT)
Dept: GERIATRIC MEDICINE | Facility: CLINIC | Age: 79
End: 2024-12-05

## 2024-12-05 ENCOUNTER — VIRTUAL VISIT (OUTPATIENT)
Dept: FAMILY MEDICINE | Facility: CLINIC | Age: 79
End: 2024-12-05
Payer: COMMERCIAL

## 2024-12-05 DIAGNOSIS — G89.4 CHRONIC PAIN SYNDROME: ICD-10-CM

## 2024-12-05 DIAGNOSIS — E11.42 DIABETIC POLYNEUROPATHY ASSOCIATED WITH TYPE 2 DIABETES MELLITUS (H): ICD-10-CM

## 2024-12-05 DIAGNOSIS — E11.42 TYPE 2 DIABETES MELLITUS WITH DIABETIC POLYNEUROPATHY, WITHOUT LONG-TERM CURRENT USE OF INSULIN (H): Primary | ICD-10-CM

## 2024-12-05 DIAGNOSIS — I10 HYPERTENSION GOAL BP (BLOOD PRESSURE) < 140/90: ICD-10-CM

## 2024-12-05 DIAGNOSIS — E78.5 HYPERLIPIDEMIA LDL GOAL <70: ICD-10-CM

## 2024-12-05 RX ORDER — SIMVASTATIN 20 MG
20 TABLET ORAL DAILY
Qty: 90 TABLET | Refills: 1 | Status: SHIPPED | OUTPATIENT
Start: 2024-12-05

## 2024-12-05 RX ORDER — PREGABALIN 100 MG/1
100 CAPSULE ORAL 2 TIMES DAILY
Qty: 180 CAPSULE | Refills: 1 | Status: SHIPPED | OUTPATIENT
Start: 2024-12-05

## 2024-12-05 RX ORDER — AMLODIPINE BESYLATE 10 MG/1
10 TABLET ORAL DAILY
Qty: 90 TABLET | Refills: 1 | Status: SHIPPED | OUTPATIENT
Start: 2024-12-05

## 2024-12-05 RX ORDER — HYDROCODONE BITARTRATE AND ACETAMINOPHEN 5; 325 MG/1; MG/1
1 TABLET ORAL 3 TIMES DAILY PRN
Qty: 50 TABLET | Refills: 0 | Status: SHIPPED | OUTPATIENT
Start: 2024-12-20

## 2024-12-05 RX ORDER — GLIMEPIRIDE 4 MG/1
4 TABLET ORAL 2 TIMES DAILY
Qty: 180 TABLET | Refills: 1 | Status: SHIPPED | OUTPATIENT
Start: 2024-12-05

## 2024-12-05 RX ORDER — PIOGLITAZONE 45 MG/1
45 TABLET ORAL DAILY
Qty: 90 TABLET | Refills: 1 | Status: SHIPPED | OUTPATIENT
Start: 2024-12-05

## 2024-12-05 ASSESSMENT — ASTHMA QUESTIONNAIRES
QUESTION_3 LAST FOUR WEEKS HOW OFTEN DID YOUR ASTHMA SYMPTOMS (WHEEZING, COUGHING, SHORTNESS OF BREATH, CHEST TIGHTNESS OR PAIN) WAKE YOU UP AT NIGHT OR EARLIER THAN USUAL IN THE MORNING: NOT AT ALL
QUESTION_2 LAST FOUR WEEKS HOW OFTEN HAVE YOU HAD SHORTNESS OF BREATH: NOT AT ALL
QUESTION_4 LAST FOUR WEEKS HOW OFTEN HAVE YOU USED YOUR RESCUE INHALER OR NEBULIZER MEDICATION (SUCH AS ALBUTEROL): NOT AT ALL
ACT_TOTALSCORE: 23
QUESTION_5 LAST FOUR WEEKS HOW WOULD YOU RATE YOUR ASTHMA CONTROL: COMPLETELY CONTROLLED
QUESTION_1 LAST FOUR WEEKS HOW MUCH OF THE TIME DID YOUR ASTHMA KEEP YOU FROM GETTING AS MUCH DONE AT WORK, SCHOOL OR AT HOME: SOME OF THE TIME
ACT_TOTALSCORE: 23

## 2024-12-05 NOTE — PROGRESS NOTES
"Enrique is a 79 year old who is being evaluated via a billable telephone visit.    What phone number would you like to be contacted at? [phone]  How would you like to obtain your AVS? Mail a copy  Originating Location (pt. Location): Home    Distant Location (provider location):  Off-site    Assessment & Plan     Type 2 diabetes mellitus with diabetic polyneuropathy, without long-term current use of insulin (H)  Numbers have not been well-controlled and I again stressed compliance with patient's daughter.  Would like to recheck sometime in the next month or so.  - glimepiride (AMARYL) 4 MG tablet; Take 1 tablet (4 mg) by mouth 2 times daily.  - metFORMIN (GLUCOPHAGE) 500 MG tablet; Take 2 tablets (1,000 mg) by mouth 2 times daily (with meals).  - pioglitazone (ACTOS) 45 MG tablet; Take 1 tablet (45 mg) by mouth daily.  - sitagliptin (JANUVIA) 50 MG tablet; Take 1 tablet (50 mg) by mouth daily.    Diabetic polyneuropathy associated with type 2 diabetes mellitus (H)  Stable and following  - HYDROcodone-acetaminophen (NORCO) 5-325 MG tablet; Take 1 tablet by mouth 3 times daily as needed for severe pain.  - pregabalin (LYRICA) 100 MG capsule; Take 1 capsule (100 mg) by mouth 2 times daily.    Hypertension goal BP (blood pressure) < 140/90  Stable on current regimen.  Continue same plan and routine follow-up.   - amLODIPine (NORVASC) 10 MG tablet; Take 1 tablet (10 mg) by mouth daily.    Hyperlipidemia LDL goal <70  Recheck and adjust as needed  - simvastatin (ZOCOR) 20 MG tablet; Take 1 tablet (20 mg) by mouth daily.    Chronic pain syndrome  Up-to-date on routine monitoring          BMI  Estimated body mass index is 27.18 kg/m  as calculated from the following:    Height as of 1/10/24: 1.45 m (4' 9.09\").    Weight as of 6/26/24: 57.2 kg (126 lb).         See Patient Instructions    Subjective   Enrique is a 79 year old, presenting for the following health issues:  med check         12/5/2024     9:51 AM "   Additional Questions   Roomed by Makenzie     History of Present Illness       Reason for visit:  Med check   She is taking medications regularly.         Telephone visit with patient and her daughter in follow-up of ongoing chronic conditions  Actually doing fairly well overall with no new complaints      Review of Systems  Constitutional, HEENT, cardiovascular, pulmonary, gi and gu systems are negative, except as otherwise noted.      Objective           Vitals:  No vitals were obtained today due to virtual visit.    Physical Exam   General: Alert and no distress //Respiratory: No audible wheeze, cough, or shortness of breath // Psychiatric:  Appropriate affect, tone, and pace of words      Past labs reviewed with the patient.       Phone call duration: 13 minutes  Signed Electronically by: Lana Peguero MD

## 2024-12-05 NOTE — PROGRESS NOTES
[facility] Care Coordination Contact    Received a request to submit a DTR for the reduced of Supplies wipes. Documentation completed and faxed to the health plan. Care Coordinator aware.    Miracle Redd RN  Utilization   [facility]  [phone]

## 2024-12-27 ENCOUNTER — TELEPHONE (OUTPATIENT)
Dept: FAMILY MEDICINE | Facility: CLINIC | Age: 79
End: 2024-12-27
Payer: COMMERCIAL

## 2024-12-27 NOTE — TELEPHONE ENCOUNTER
Forms/Letter Request    Type of form/letter: OTHER: [employer] prescription/ Certification of medical necessity       Do we have the form/letter: Yes: Place in provider's inbox    Who is the form from? [employer] medical supply advocate  (if other please explain)    Where did/will the form come from? form was faxed in    When is form/letter needed by: ASAP    How would you like the form/letter returned: Fax : [phone]

## 2024-12-30 ENCOUNTER — MEDICAL CORRESPONDENCE (OUTPATIENT)
Dept: HEALTH INFORMATION MANAGEMENT | Facility: CLINIC | Age: 79
End: 2024-12-30
Payer: COMMERCIAL

## 2025-01-16 DIAGNOSIS — E11.42 DIABETIC POLYNEUROPATHY ASSOCIATED WITH TYPE 2 DIABETES MELLITUS (H): ICD-10-CM

## 2025-01-16 RX ORDER — HYDROCODONE BITARTRATE AND ACETAMINOPHEN 5; 325 MG/1; MG/1
1 TABLET ORAL 3 TIMES DAILY PRN
Qty: 50 TABLET | Refills: 0 | Status: SHIPPED | OUTPATIENT
Start: 2025-01-16

## 2025-01-16 NOTE — TELEPHONE ENCOUNTER
Medication Question or Refill        What medication are you calling about (include dose and sig)?: HYDROcodone-acetaminophen (NORCO) 5-325 MG tablet     Preferred Pharmacy:  [id number] - [place] [address] AT [address]  [place] [address]  Phone: [phone] Fax: [phone]        Controlled Substance Agreement on file:   CSA -- Patient Level:     [Media Unavailable] Controlled Substance Agreement - Opioid - Scan on 6/27/2024 10:29 AM: Controlled Subastance Agreement 2024   [Media Unavailable] Controlled Substance Agreement - Opioid - Scan on 6/13/2023  5:08 PM   [Media Unavailable] Controlled Substance Agreement - Opioid - Scan on 1/21/2022 12:19 PM   [Media Unavailable] Controlled Substance Agreement - Opioid - Scan on 12/6/2020  2:38 PM   [Media Unavailable] Controlled Substance Agreement - Opioid - Scan on 5/20/2019  6:59 AM       Who prescribed the medication?: Dr Peguero    Do you need a refill? Yes    When did you use the medication last? daily    Patient offered an appointment? No    Do you have any questions or concerns?  No      Okay to leave a detailed message?: Yes at Cell number on file:    Telephone Information:   Mobile [phone]

## 2025-03-07 DIAGNOSIS — E11.42 DIABETIC POLYNEUROPATHY ASSOCIATED WITH TYPE 2 DIABETES MELLITUS (H): ICD-10-CM

## 2025-03-07 NOTE — TELEPHONE ENCOUNTER
Medication Question or Refill        What medication are you calling about (include dose and sig)?: HYDROcodone-acetaminophen (NORCO) 5-325 MG tablet     Preferred Pharmacy:  [id number] - [place] [address] AT [address]  [place] [address]  Phone: [phone] Fax: [phone]      Controlled Substance Agreement on file:   CSA -- Patient Level:     [Media Unavailable] Controlled Substance Agreement - Opioid - Scan on 6/27/2024 10:29 AM: Controlled Subastance Agreement 2024   [Media Unavailable] Controlled Substance Agreement - Opioid - Scan on 6/13/2023  5:08 PM   [Media Unavailable] Controlled Substance Agreement - Opioid - Scan on 1/21/2022 12:19 PM   [Media Unavailable] Controlled Substance Agreement - Opioid - Scan on 12/6/2020  2:38 PM   [Media Unavailable] Controlled Substance Agreement - Opioid - Scan on 5/20/2019  6:59 AM       Who prescribed the medication?: Dt Marielena    Do you need a refill? Yes    When did you use the medication last? daily    Patient offered an appointment? No    Do you have any questions or concerns?  No      Okay to leave a detailed message?: Yes at Cell number on file:    Telephone Information:   Mobile [phone]

## 2025-03-10 RX ORDER — HYDROCODONE BITARTRATE AND ACETAMINOPHEN 5; 325 MG/1; MG/1
1 TABLET ORAL 3 TIMES DAILY PRN
Qty: 50 TABLET | Refills: 0 | Status: SHIPPED | OUTPATIENT
Start: 2025-03-10

## 2025-03-10 NOTE — TELEPHONE ENCOUNTER
First attempt. Left message for patient to return call to clinic per refill team.        I can't find pharmacy in system. Please pend pharmacy and send to provider. If you can't find call pt for alternate pharmacy.        Upon further investigation, pharmacy is closed. Need to contact patient about alternative pharmacy.     RN, if/when patient returns call, please review message as shown. Kvng Neely RN, BSN

## 2025-03-10 NOTE — TELEPHONE ENCOUNTER
Daughter returning call. Pharmacy information updated. Routing refill request to provider. Kvng Neely, RN, BSN

## 2025-03-10 NOTE — TELEPHONE ENCOUNTER
I can't find pharmacy in system. Please pend pharmacy and send to provider. If you can't find call pt for alternate pharmacy.  Morena AMAYAN, RN

## 2025-03-10 NOTE — TELEPHONE ENCOUNTER
Refilled x 1.  Needs visit (OFV or video visit) before any further refill.  Due for pain follow up

## 2025-03-11 NOTE — TELEPHONE ENCOUNTER
Routing to TEAM, please assist with scheduling follow up per Lana Peguero MD. Kvng Neely RN, BSN

## 2025-04-01 ENCOUNTER — PATIENT OUTREACH (OUTPATIENT)
Dept: GERIATRIC MEDICINE | Facility: CLINIC | Age: 80
End: 2025-04-01
Payer: COMMERCIAL

## 2025-04-01 NOTE — PROGRESS NOTES
Completed following tasks: Sent copy of service delivery plan and addendum to member and [facility]. Sent copy of revised support plan to member. Submitted auth to health plan. Updated database.    Mirna Justice  Care Management Specialist  [facility]  [phone]

## 2025-04-14 ENCOUNTER — PATIENT OUTREACH (OUTPATIENT)
Dept: GERIATRIC MEDICINE | Facility: CLINIC | Age: 80
End: 2025-04-14
Payer: COMMERCIAL

## 2025-04-14 NOTE — PROGRESS NOTES
[facility] Care Coordination Contact    Call from member's daughter, Pilo stating member's services was discharged/terminated from [facility] and she is not sure what's going on. CC contacted [facility] with member's daughter, Pilo to discuss member's discharge. Per Dedra in Compliance from [facility], she states member was discharged as they made numerous unsuccessful attempts to complete supervision visits. [facility] member's PCA/Homemaker was discharged at the end of Feb 2025. [facility] member can write an appeal into [facility]. Per [facility], they will make a decision within 7 days from receipt of the appeal. [facility] verbalized understanding and states she will assist member in writing an appeal. CC asked [facility] to contact CC if member does not return to [facility] for services. CC can assist member/family in finding a new home care agency for services. [facility] verbalized understanding.       Regine Stapleton RN, PHN  [facility]  [phone]

## 2025-04-17 ENCOUNTER — PATIENT OUTREACH (OUTPATIENT)
Dept: GERIATRIC MEDICINE | Facility: CLINIC | Age: 80
End: 2025-04-17
Payer: COMMERCIAL

## 2025-04-17 NOTE — PROGRESS NOTES
[facility] Care Coordination Contact    Received a vm from Dedra at [facility] stating they received the member's appeal letter and will reinstate her CFSS/homemaker services. Dedra states their intake team will contact the member to schedule the reinstatement appointment.     CC left a VM for member's daughter, Pilo to be on the lookout for a call from [facility]'s intake team.       Regine Stapleton RN, PHN  [facility]  [phone]

## 2025-04-21 DIAGNOSIS — E11.42 DIABETIC POLYNEUROPATHY ASSOCIATED WITH TYPE 2 DIABETES MELLITUS (H): ICD-10-CM

## 2025-04-21 RX ORDER — HYDROCODONE BITARTRATE AND ACETAMINOPHEN 5; 325 MG/1; MG/1
1 TABLET ORAL 3 TIMES DAILY PRN
Qty: 50 TABLET | Refills: 0 | Status: SHIPPED | OUTPATIENT
Start: 2025-04-21

## 2025-04-21 NOTE — CONFIDENTIAL NOTE
Medication Question or Refill        What medication are you calling about (include dose and sig)?: HYDROcodone-acetaminophen (NORCO) 5-325 MG tablet      Preferred Pharmacy:       [id number] - [place], [place] - [address] [facility] [address]  [address]  [facility] [address]  Phone: [phone] Fax: [phone]      Controlled Substance Agreement on file:   CSA -- Patient Level:     [Media Unavailable] Controlled Substance Agreement - Opioid - Scan on 6/27/2024 10:29 AM: Controlled Subastance Agreement 2024   [Media Unavailable] Controlled Substance Agreement - Opioid - Scan on 6/13/2023  5:08 PM   [Media Unavailable] Controlled Substance Agreement - Opioid - Scan on 1/21/2022 12:19 PM   [Media Unavailable] Controlled Substance Agreement - Opioid - Scan on 12/6/2020  2:38 PM   [Media Unavailable] Controlled Substance Agreement - Opioid - Scan on 5/20/2019  6:59 AM       Who prescribed the medication?: Marielena    Do you need a refill? Yes    When did you use the medication last? 2 days ago    Patient offered an appointment? No    Do you have any questions or concerns?  No      Okay to leave a detailed message?: Yes at Cell number on file:    Telephone Information:   Mobile [phone]

## 2025-04-24 ENCOUNTER — TELEPHONE (OUTPATIENT)
Dept: PHARMACY | Facility: OTHER | Age: 80
End: 2025-04-24
Payer: COMMERCIAL

## 2025-04-24 NOTE — TELEPHONE ENCOUNTER
[facility] Recruitment: [facility] insurance     Referral outreach attempt #1 on April 24, 2025      Outcome: left voicemail- Call back number [phone]    Emiliana [facility]

## 2025-04-29 ENCOUNTER — TELEPHONE (OUTPATIENT)
Dept: PHARMACY | Facility: OTHER | Age: 80
End: 2025-04-29
Payer: COMMERCIAL

## 2025-04-29 NOTE — TELEPHONE ENCOUNTER
[facility] Recruitment: [facility] insurance     Referral outreach attempt #2 on April 29, 2025      Outcome: left voicemail- Call back number [phone]    Emiliana [facility]

## 2025-05-15 ENCOUNTER — PATIENT OUTREACH (OUTPATIENT)
Dept: CARE COORDINATION | Facility: CLINIC | Age: 80
End: 2025-05-15
Payer: COMMERCIAL

## 2025-05-27 DIAGNOSIS — E11.42 DIABETIC POLYNEUROPATHY ASSOCIATED WITH TYPE 2 DIABETES MELLITUS (H): ICD-10-CM

## 2025-05-27 RX ORDER — HYDROCODONE BITARTRATE AND ACETAMINOPHEN 5; 325 MG/1; MG/1
1 TABLET ORAL 3 TIMES DAILY PRN
Qty: 50 TABLET | Refills: 0 | Status: SHIPPED | OUTPATIENT
Start: 2025-05-27

## 2025-06-03 ENCOUNTER — PATIENT OUTREACH (OUTPATIENT)
Dept: GERIATRIC MEDICINE | Facility: CLINIC | Age: 80
End: 2025-06-03
Payer: COMMERCIAL

## 2025-06-03 NOTE — PROGRESS NOTES
[facility] Care Coordination Contact      [facility] Six-Month Telephone Assessment    6 month telephone assessment completed on 6/3/2025.    ER visits: No  Hospitalizations: No  TCU stays: No  Significant health status changes: none reported  Falls/Injuries: No  ADL/IADL changes: No  Changes in services: No    Caregiver Assessment follow up: n/a    Goals: See Support Plan for goal progress documentation.  Spoke to member's daughter, Pilo and she states member's health is stable. Member's current support and services are meeting her needs. Support Plan reviewed and updated.     Will see member in 6 months for an annual health risk assessment.   Encouraged member to call CC with any questions or concerns in the meantime.       Regine Stapleton RN, PHN  [facility]  [phone]

## 2025-06-12 DIAGNOSIS — E78.5 HYPERLIPIDEMIA LDL GOAL <70: ICD-10-CM

## 2025-06-12 DIAGNOSIS — I10 HYPERTENSION GOAL BP (BLOOD PRESSURE) < 140/90: ICD-10-CM

## 2025-06-12 RX ORDER — AMLODIPINE BESYLATE 10 MG/1
10 TABLET ORAL DAILY
Qty: 90 TABLET | Refills: 0 | Status: SHIPPED | OUTPATIENT
Start: 2025-06-12

## 2025-06-12 RX ORDER — SIMVASTATIN 20 MG
20 TABLET ORAL DAILY
Qty: 90 TABLET | Refills: 0 | Status: SHIPPED | OUTPATIENT
Start: 2025-06-12

## 2025-06-17 ENCOUNTER — TELEPHONE (OUTPATIENT)
Dept: FAMILY MEDICINE | Facility: CLINIC | Age: 80
End: 2025-06-17
Payer: COMMERCIAL

## 2025-06-17 DIAGNOSIS — E11.42 DIABETIC POLYNEUROPATHY ASSOCIATED WITH TYPE 2 DIABETES MELLITUS (H): ICD-10-CM

## 2025-06-17 RX ORDER — HYDROCODONE BITARTRATE AND ACETAMINOPHEN 5; 325 MG/1; MG/1
1 TABLET ORAL 3 TIMES DAILY PRN
Qty: 50 TABLET | Refills: 0 | Status: SHIPPED | OUTPATIENT
Start: 2025-06-17

## 2025-07-01 DIAGNOSIS — E11.42 TYPE 2 DIABETES MELLITUS WITH DIABETIC POLYNEUROPATHY, WITHOUT LONG-TERM CURRENT USE OF INSULIN (H): ICD-10-CM

## 2025-07-01 NOTE — TELEPHONE ENCOUNTER
Patient has not had diabetes labs in over a year.  I ordered them back in March.  She needs to schedule a lab appointment and then I will send in refills.

## 2025-07-02 NOTE — TELEPHONE ENCOUNTER
First attempt. Left message for patient to return call to clinic as requested by Lana Peguero MD:    Prescription requested: Metformin, Glimepiride, Januvia    Patient has not had diabetes labs in over a year.  I ordered them back in March.  She needs to schedule a lab appointment and then I will send in refills.     RN, if/when patient returns call, please review message as shown. Kylee Neely RN, BSN, PHN

## 2025-07-03 NOTE — TELEPHONE ENCOUNTER
Attempt # 2 using [employer]  ID # [id number].  Left message for patient to call clinic at: [phone].

## 2025-07-07 RX ORDER — SITAGLIPTIN 50 MG/1
50 TABLET, FILM COATED ORAL DAILY
Qty: 90 TABLET | Refills: 0 | Status: SHIPPED | OUTPATIENT
Start: 2025-07-07

## 2025-07-07 RX ORDER — GLIMEPIRIDE 4 MG/1
4 TABLET ORAL 2 TIMES DAILY
Qty: 180 TABLET | Refills: 0 | Status: SHIPPED | OUTPATIENT
Start: 2025-07-07

## 2025-07-07 NOTE — TELEPHONE ENCOUNTER
Refilled x 1.  Needs visit (OFV or video visit) before any further refill.  Absolutely no further refills without lab and follow-up

## 2025-07-07 NOTE — TELEPHONE ENCOUNTER
Third attempt. Contacted patient via [facility]  as requested by Lana Peguero MD:    Prescription requested: Metformin, Glimepiride, Januvia     Patient has not had diabetes labs in over a year.  I ordered them back in March.  She needs to schedule a lab appointment and then I will send in refills.     Routing to provider, multiple attempts have been made to reach the patient about request. Please advise on how you would like us to proceed? Kylee Neely, RN, BSN, PHN

## 2025-07-25 DIAGNOSIS — E11.42 DIABETIC POLYNEUROPATHY ASSOCIATED WITH TYPE 2 DIABETES MELLITUS (H): ICD-10-CM

## 2025-08-07 RX ORDER — HYDROCODONE BITARTRATE AND ACETAMINOPHEN 5; 325 MG/1; MG/1
1 TABLET ORAL 3 TIMES DAILY PRN
Qty: 50 TABLET | Refills: 0 | Status: SHIPPED | OUTPATIENT
Start: 2025-08-07

## 2025-08-11 ENCOUNTER — OFFICE VISIT (OUTPATIENT)
Dept: FAMILY MEDICINE | Facility: CLINIC | Age: 80
End: 2025-08-11
Payer: COMMERCIAL

## 2025-08-11 VITALS
BODY MASS INDEX: 29.56 KG/M2 | TEMPERATURE: 98.2 F | SYSTOLIC BLOOD PRESSURE: 99 MMHG | OXYGEN SATURATION: 97 % | HEIGHT: 57 IN | HEART RATE: 111 BPM | RESPIRATION RATE: 18 BRPM | DIASTOLIC BLOOD PRESSURE: 64 MMHG | WEIGHT: 137 LBS

## 2025-08-11 DIAGNOSIS — I10 HYPERTENSION GOAL BP (BLOOD PRESSURE) < 140/90: ICD-10-CM

## 2025-08-11 DIAGNOSIS — H90.3 BILATERAL SENSORINEURAL HEARING LOSS: ICD-10-CM

## 2025-08-11 DIAGNOSIS — E11.42 DIABETIC POLYNEUROPATHY ASSOCIATED WITH TYPE 2 DIABETES MELLITUS (H): ICD-10-CM

## 2025-08-11 DIAGNOSIS — E78.5 HYPERLIPIDEMIA LDL GOAL <70: ICD-10-CM

## 2025-08-11 DIAGNOSIS — E11.42 TYPE 2 DIABETES MELLITUS WITH DIABETIC POLYNEUROPATHY, WITHOUT LONG-TERM CURRENT USE OF INSULIN (H): Primary | ICD-10-CM

## 2025-08-11 DIAGNOSIS — L30.9 DERMATITIS: ICD-10-CM

## 2025-08-11 DIAGNOSIS — G89.4 CHRONIC PAIN SYNDROME: ICD-10-CM

## 2025-08-11 LAB
ALBUMIN SERPL BCG-MCNC: 4.2 G/DL (ref 3.5–5.2)
ALP SERPL-CCNC: 147 U/L (ref 40–150)
ALT SERPL W P-5'-P-CCNC: 12 U/L (ref 0–50)
AMPHETAMINES UR QL: NOT DETECTED
ANION GAP SERPL CALCULATED.3IONS-SCNC: 15 MMOL/L (ref 7–15)
AST SERPL W P-5'-P-CCNC: 18 U/L (ref 0–45)
BARBITURATES UR QL SCN: NOT DETECTED
BENZODIAZ UR QL SCN: NOT DETECTED
BILIRUB SERPL-MCNC: 0.5 MG/DL
BUN SERPL-MCNC: 12.2 MG/DL (ref 8–23)
BUPRENORPHINE UR QL: NOT DETECTED
CALCIUM SERPL-MCNC: 9.6 MG/DL (ref 8.8–10.4)
CANNABINOIDS UR QL: NOT DETECTED
CHLORIDE SERPL-SCNC: 97 MMOL/L (ref 98–107)
CHOLEST SERPL-MCNC: 210 MG/DL
COCAINE UR QL SCN: NOT DETECTED
CREAT SERPL-MCNC: 0.71 MG/DL (ref 0.51–0.95)
CREAT UR-MCNC: 80.7 MG/DL
D-METHAMPHET UR QL: NOT DETECTED
EGFRCR SERPLBLD CKD-EPI 2021: 85 ML/MIN/1.73M2
EST. AVERAGE GLUCOSE BLD GHB EST-MCNC: >384 MG/DL
FASTING STATUS PATIENT QL REPORTED: YES
FASTING STATUS PATIENT QL REPORTED: YES
GLUCOSE SERPL-MCNC: 449 MG/DL (ref 70–99)
HBA1C MFR BLD: >15 % (ref 0–5.6)
HCO3 SERPL-SCNC: 22 MMOL/L (ref 22–29)
HDLC SERPL-MCNC: 72 MG/DL
LDLC SERPL CALC-MCNC: 88 MG/DL
METHADONE UR QL SCN: NOT DETECTED
MICROALBUMIN UR-MCNC: 312 MG/L
MICROALBUMIN/CREAT UR: 386.62 MG/G CR (ref 0–25)
NONHDLC SERPL-MCNC: 138 MG/DL
OPIATES UR QL SCN: NOT DETECTED
OXYCODONE UR QL SCN: NOT DETECTED
PCP UR QL SCN: NOT DETECTED
POTASSIUM SERPL-SCNC: 4.6 MMOL/L (ref 3.4–5.3)
PROT SERPL-MCNC: 7.3 G/DL (ref 6.4–8.3)
SODIUM SERPL-SCNC: 134 MMOL/L (ref 135–145)
TRICYCLICS UR QL SCN: NOT DETECTED
TRIGL SERPL-MCNC: 249 MG/DL

## 2025-08-11 PROCEDURE — 3078F DIAST BP <80 MM HG: CPT | Performed by: FAMILY MEDICINE

## 2025-08-11 PROCEDURE — 80306 DRUG TEST PRSMV INSTRMNT: CPT | Performed by: FAMILY MEDICINE

## 2025-08-11 PROCEDURE — 82570 ASSAY OF URINE CREATININE: CPT | Mod: 59 | Performed by: FAMILY MEDICINE

## 2025-08-11 PROCEDURE — 1125F AMNT PAIN NOTED PAIN PRSNT: CPT | Performed by: FAMILY MEDICINE

## 2025-08-11 PROCEDURE — 3074F SYST BP LT 130 MM HG: CPT | Performed by: FAMILY MEDICINE

## 2025-08-11 PROCEDURE — 36415 COLL VENOUS BLD VENIPUNCTURE: CPT | Performed by: FAMILY MEDICINE

## 2025-08-11 PROCEDURE — 80061 LIPID PANEL: CPT | Performed by: FAMILY MEDICINE

## 2025-08-11 PROCEDURE — 99214 OFFICE O/P EST MOD 30 MIN: CPT | Performed by: FAMILY MEDICINE

## 2025-08-11 PROCEDURE — 82043 UR ALBUMIN QUANTITATIVE: CPT | Performed by: FAMILY MEDICINE

## 2025-08-11 PROCEDURE — 83036 HEMOGLOBIN GLYCOSYLATED A1C: CPT | Performed by: FAMILY MEDICINE

## 2025-08-11 PROCEDURE — G2211 COMPLEX E/M VISIT ADD ON: HCPCS | Performed by: FAMILY MEDICINE

## 2025-08-11 PROCEDURE — 80053 COMPREHEN METABOLIC PANEL: CPT | Performed by: FAMILY MEDICINE

## 2025-08-11 PROCEDURE — 3046F HEMOGLOBIN A1C LEVEL >9.0%: CPT | Performed by: FAMILY MEDICINE

## 2025-08-11 RX ORDER — TRIAMCINOLONE ACETONIDE 1 MG/G
CREAM TOPICAL
Qty: 80 G | Refills: 2 | Status: SHIPPED | OUTPATIENT
Start: 2025-08-11

## 2025-08-11 RX ORDER — SIMVASTATIN 20 MG
20 TABLET ORAL DAILY
Qty: 90 TABLET | Refills: 1 | Status: SHIPPED | OUTPATIENT
Start: 2025-08-11

## 2025-08-11 RX ORDER — PREGABALIN 150 MG/1
150 CAPSULE ORAL 2 TIMES DAILY
Qty: 180 CAPSULE | Refills: 1 | Status: SHIPPED | OUTPATIENT
Start: 2025-08-11

## 2025-08-11 RX ORDER — AMLODIPINE BESYLATE 10 MG/1
10 TABLET ORAL DAILY
Qty: 90 TABLET | Refills: 1 | Status: SHIPPED | OUTPATIENT
Start: 2025-08-11

## 2025-08-11 RX ORDER — GLIMEPIRIDE 4 MG/1
4 TABLET ORAL 2 TIMES DAILY
Qty: 180 TABLET | Refills: 0 | Status: SHIPPED | OUTPATIENT
Start: 2025-08-11

## 2025-08-11 RX ORDER — PIOGLITAZONE 45 MG/1
45 TABLET ORAL DAILY
Qty: 90 TABLET | Refills: 1 | Status: SHIPPED | OUTPATIENT
Start: 2025-08-11

## 2025-08-11 ASSESSMENT — ASTHMA QUESTIONNAIRES
QUESTION_2 LAST FOUR WEEKS HOW OFTEN HAVE YOU HAD SHORTNESS OF BREATH: NOT AT ALL
QUESTION_4 LAST FOUR WEEKS HOW OFTEN HAVE YOU USED YOUR RESCUE INHALER OR NEBULIZER MEDICATION (SUCH AS ALBUTEROL): NOT AT ALL
QUESTION_5 LAST FOUR WEEKS HOW WOULD YOU RATE YOUR ASTHMA CONTROL: COMPLETELY CONTROLLED
QUESTION_1 LAST FOUR WEEKS HOW MUCH OF THE TIME DID YOUR ASTHMA KEEP YOU FROM GETTING AS MUCH DONE AT WORK, SCHOOL OR AT HOME: NONE OF THE TIME
ACT_TOTALSCORE: 25
QUESTION_3 LAST FOUR WEEKS HOW OFTEN DID YOUR ASTHMA SYMPTOMS (WHEEZING, COUGHING, SHORTNESS OF BREATH, CHEST TIGHTNESS OR PAIN) WAKE YOU UP AT NIGHT OR EARLIER THAN USUAL IN THE MORNING: NOT AT ALL

## 2025-08-11 ASSESSMENT — ANXIETY QUESTIONNAIRES
3. WORRYING TOO MUCH ABOUT DIFFERENT THINGS: NEARLY EVERY DAY
5. BEING SO RESTLESS THAT IT IS HARD TO SIT STILL: NEARLY EVERY DAY
8. IF YOU CHECKED OFF ANY PROBLEMS, HOW DIFFICULT HAVE THESE MADE IT FOR YOU TO DO YOUR WORK, TAKE CARE OF THINGS AT HOME, OR GET ALONG WITH OTHER PEOPLE?: NOT DIFFICULT AT ALL
4. TROUBLE RELAXING: NEARLY EVERY DAY
7. FEELING AFRAID AS IF SOMETHING AWFUL MIGHT HAPPEN: NEARLY EVERY DAY
GAD7 TOTAL SCORE: 21
6. BECOMING EASILY ANNOYED OR IRRITABLE: NEARLY EVERY DAY
1. FEELING NERVOUS, ANXIOUS, OR ON EDGE: NEARLY EVERY DAY
GAD7 TOTAL SCORE: 21
7. FEELING AFRAID AS IF SOMETHING AWFUL MIGHT HAPPEN: NEARLY EVERY DAY
2. NOT BEING ABLE TO STOP OR CONTROL WORRYING: NEARLY EVERY DAY
IF YOU CHECKED OFF ANY PROBLEMS ON THIS QUESTIONNAIRE, HOW DIFFICULT HAVE THESE PROBLEMS MADE IT FOR YOU TO DO YOUR WORK, TAKE CARE OF THINGS AT HOME, OR GET ALONG WITH OTHER PEOPLE: NOT DIFFICULT AT ALL
GAD7 TOTAL SCORE: 21

## 2025-08-11 ASSESSMENT — PAIN SCALES - GENERAL: PAINLEVEL_OUTOF10: SEVERE PAIN (8)

## 2025-08-11 ASSESSMENT — PATIENT HEALTH QUESTIONNAIRE - PHQ9: SUM OF ALL RESPONSES TO PHQ QUESTIONS 1-9: 3

## 2025-08-12 ENCOUNTER — PATIENT OUTREACH (OUTPATIENT)
Dept: CARE COORDINATION | Facility: CLINIC | Age: 80
End: 2025-08-12
Payer: COMMERCIAL

## 2025-08-13 DIAGNOSIS — E11.42 TYPE 2 DIABETES MELLITUS WITH DIABETIC POLYNEUROPATHY, WITHOUT LONG-TERM CURRENT USE OF INSULIN (H): Primary | ICD-10-CM

## 2025-08-14 ENCOUNTER — PATIENT OUTREACH (OUTPATIENT)
Dept: CARE COORDINATION | Facility: CLINIC | Age: 80
End: 2025-08-14
Payer: COMMERCIAL